# Patient Record
Sex: FEMALE | Race: WHITE | Employment: UNEMPLOYED | URBAN - METROPOLITAN AREA
[De-identification: names, ages, dates, MRNs, and addresses within clinical notes are randomized per-mention and may not be internally consistent; named-entity substitution may affect disease eponyms.]

---

## 2018-06-26 ENCOUNTER — HOSPITAL ENCOUNTER (OUTPATIENT)
Dept: WOUND CARE | Age: 48
Discharge: HOME | End: 2018-06-26
Payer: MEDICAID

## 2018-06-26 VITALS
HEIGHT: 65 IN | BODY MASS INDEX: 48.82 KG/M2 | RESPIRATION RATE: 20 BRPM | DIASTOLIC BLOOD PRESSURE: 68 MMHG | TEMPERATURE: 99 F | WEIGHT: 293 LBS | SYSTOLIC BLOOD PRESSURE: 133 MMHG | HEART RATE: 90 BPM

## 2018-06-26 DIAGNOSIS — E66.01 MORBID OBESITY DUE TO EXCESS CALORIES (HCC): Chronic | ICD-10-CM

## 2018-06-26 DIAGNOSIS — I83.012 VENOUS STASIS ULCER OF RIGHT CALF LIMITED TO BREAKDOWN OF SKIN WITH VARICOSE VEINS (HCC): Chronic | ICD-10-CM

## 2018-06-26 DIAGNOSIS — L97.211 VENOUS STASIS ULCER OF RIGHT CALF LIMITED TO BREAKDOWN OF SKIN WITH VARICOSE VEINS (HCC): Chronic | ICD-10-CM

## 2018-06-26 DIAGNOSIS — L97.212 VENOUS STASIS ULCER OF RIGHT CALF WITH FAT LAYER EXPOSED WITH VARICOSE VEINS (HCC): Chronic | ICD-10-CM

## 2018-06-26 DIAGNOSIS — I83.012 VENOUS STASIS ULCER OF RIGHT CALF WITH FAT LAYER EXPOSED WITH VARICOSE VEINS (HCC): Chronic | ICD-10-CM

## 2018-06-26 PROBLEM — L97.219 VENOUS STASIS ULCER OF RIGHT CALF (HCC): Chronic | Status: ACTIVE | Noted: 2018-06-26

## 2018-06-26 PROBLEM — I83.022 VENOUS STASIS ULCER OF LEFT CALF (HCC): Chronic | Status: ACTIVE | Noted: 2018-06-26

## 2018-06-26 PROBLEM — L97.229 VENOUS STASIS ULCER OF LEFT CALF (HCC): Chronic | Status: ACTIVE | Noted: 2018-06-26

## 2018-06-26 PROCEDURE — 87070 CULTURE OTHR SPECIMN AEROBIC: CPT

## 2018-06-26 PROCEDURE — 87186 SC STD MICRODIL/AGAR DIL: CPT

## 2018-06-26 PROCEDURE — 87205 SMEAR GRAM STAIN: CPT

## 2018-06-26 PROCEDURE — 99204 OFFICE O/P NEW MOD 45 MIN: CPT

## 2018-06-26 PROCEDURE — 87077 CULTURE AEROBIC IDENTIFY: CPT

## 2018-06-26 RX ORDER — ESCITALOPRAM OXALATE 20 MG/1
20 TABLET ORAL DAILY
COMMUNITY
Start: 2018-02-06

## 2018-06-26 RX ORDER — PREDNISONE 20 MG/1
20 TABLET ORAL DAILY
COMMUNITY
End: 2018-07-17

## 2018-06-26 RX ORDER — OXYBUTYNIN CHLORIDE 5 MG/1
5 TABLET ORAL 2 TIMES DAILY
Status: ON HOLD | COMMUNITY
End: 2019-12-07

## 2018-06-26 RX ORDER — TOPIRAMATE 25 MG/1
25 TABLET ORAL DAILY
Status: ON HOLD | COMMUNITY
End: 2018-09-18

## 2018-06-26 RX ORDER — ACETAMINOPHEN 500 MG
1000 TABLET ORAL 3 TIMES DAILY
COMMUNITY
Start: 2018-06-20

## 2018-06-26 RX ORDER — FUROSEMIDE 40 MG/1
TABLET ORAL
Status: ON HOLD | COMMUNITY
Start: 2018-06-04 | End: 2019-12-07

## 2018-06-26 RX ORDER — MELOXICAM 15 MG/1
15 TABLET ORAL DAILY
Status: ON HOLD | COMMUNITY
End: 2019-12-07

## 2018-06-26 RX ORDER — BUPROPION HYDROCHLORIDE 300 MG/1
300 TABLET ORAL EVERY MORNING
COMMUNITY
Start: 2018-05-02

## 2018-06-26 RX ORDER — TOPIRAMATE 25 MG/1
25 CAPSULE, COATED PELLETS ORAL DAILY
COMMUNITY
End: 2018-09-18

## 2018-06-26 RX ORDER — OMEPRAZOLE 20 MG/1
20 CAPSULE, DELAYED RELEASE ORAL DAILY PRN
Status: ON HOLD | COMMUNITY
End: 2019-12-07

## 2018-06-28 LAB
GRAM STAIN RESULT: ABNORMAL
ORGANISM: ABNORMAL
WOUND/ABSCESS: ABNORMAL
WOUND/ABSCESS: ABNORMAL

## 2018-07-03 ENCOUNTER — HOSPITAL ENCOUNTER (OUTPATIENT)
Dept: WOUND CARE | Age: 48
Discharge: HOME | End: 2018-07-03
Payer: COMMERCIAL

## 2018-07-03 VITALS
SYSTOLIC BLOOD PRESSURE: 135 MMHG | TEMPERATURE: 99.2 F | DIASTOLIC BLOOD PRESSURE: 67 MMHG | HEART RATE: 92 BPM | RESPIRATION RATE: 20 BRPM

## 2018-07-03 PROCEDURE — 99213 OFFICE O/P EST LOW 20 MIN: CPT

## 2018-07-03 ASSESSMENT — PAIN DESCRIPTION - LOCATION: LOCATION: LEG

## 2018-07-03 ASSESSMENT — PAIN SCALES - GENERAL: PAINLEVEL_OUTOF10: 8

## 2018-07-03 ASSESSMENT — PAIN DESCRIPTION - ORIENTATION: ORIENTATION: LEFT;POSTERIOR

## 2018-07-03 ASSESSMENT — PAIN DESCRIPTION - PAIN TYPE: TYPE: ACUTE PAIN

## 2018-07-03 ASSESSMENT — PAIN DESCRIPTION - DESCRIPTORS: DESCRIPTORS: DULL

## 2018-07-03 NOTE — PROGRESS NOTES
[facility]   Progress Note and Procedure Note      Clair Mujica  MEDICAL RECORD NUMBER:  [id number]  AGE: 56 y.o. GENDER: female  : 1970  EPISODE DATE:  7/3/2018    Subjective:     Chief Complaint   Patient presents with    Wound Check     BLE wound recheck         HISTORY of PRESENT ILLNESS HPI     Clair Mujica is a 56 y.o. female who presents today for wound/ulcer evaluation. History of Wound Context: Non-healing venous stasis ulcers bilateral LE. Patient had a rash form the keflex and stopped. Wound/Ulcer Pain Timing/Severity: constant  Quality of pain: sharp, burning  Severity:  5 / 10   Modifying Factors: Pain worsens with walking  Associated Signs/Symptoms: edema, erythema, drainage and pain    Ulcer Identification:  Ulcer Type: venous and lymphedema  Contributing Factors: edema, venous stasis, lymphedema and obesity    Wound: N/A        PAST MEDICAL HISTORY        Diagnosis Date    Morbid obesity ([address]     Overactive bladder     Psychiatric problem        PAST SURGICAL HISTORY    Past Surgical History:   Procedure Laterality Date    ABDOMEN SURGERY      gastric bypass surgery    BACK SURGERY      CHOLECYSTECTOMY         FAMILY HISTORY    History reviewed. No pertinent family history.     SOCIAL HISTORY    Social History   Substance Use Topics    Smoking status: Never Smoker    Smokeless tobacco: Never Used    Alcohol use No       ALLERGIES    Allergies   Allergen Reactions    Keflex [Cephalexin] Itching and Rash       MEDICATIONS    Current Outpatient Prescriptions on File Prior to Encounter   Medication Sig Dispense Refill    meloxicam (MOBIC) 15 MG tablet Take 15 mg by mouth daily      omeprazole (PRILOSEC) 20 MG delayed release capsule Take 20 mg by mouth daily      acetaminophen (TYLENOL) 500 MG tablet Take 1,000 mg by mouth      buPROPion (WELLBUTRIN XL) 300 MG extended release tablet Take 300 mg by mouth      escitalopram (LEXAPRO) 20 MG tablet Take 20 mg by mouth Wound Type Wound 6/26/2018  9:18 AM   Wound Venous 6/26/2018  9:18 AM   Wound Cleansed Rinsed/Irrigated with saline 6/26/2018  9:18 AM   Wound Length (cm) 6.2 cm 6/26/2018  9:18 AM   Wound Width (cm) 7.5 cm 6/26/2018  9:18 AM   Wound Depth (cm)  .1 6/26/2018  9:18 AM   Calculated Wound Size (cm^2) (l*w) 46.5 cm^2 6/26/2018  9:18 AM   Drainage Amount Moderate 6/26/2018  9:18 AM   Drainage Description Serous 6/26/2018  9:18 AM   Odor None 6/26/2018  9:18 AM   Margins Defined edges 6/26/2018  9:18 AM   Radha-wound Assessment Dry 6/26/2018  9:18 AM   Non-staged Wound Description Full thickness 6/26/2018  9:18 AM   [place]%Wound Bed 30 6/26/2018  9:18 AM   Op First Treatment Date 06/26/18 6/26/2018  9:18 AM   Number of days: 0         Plan:     Stop any abx  Increase diuretic per PCP      Treatment Note please see attached Discharge Instructions    Written patient dismissal instructions given to patient and signed by patient or POA. Discharge Instructions            Visit Discharge/Physician Orders:     Home Care: none     Wound Location: Right and Left b/l lower leg wounds     Dressing orders: 1. Cleanse wound(s) with normal saline. 2. Apply dry SILVERCEL OR CALCIUM ALGINATE WITH Ag or eqivalent to wound bed. 3. Cover with 4x4's, ABDs and wrap with gauze (crispin or kerlix)  4. Change  Daily     TODAY IN [facility]; apply silvercel (double up silvercel on right leg and ABDs) and use silvercel and dry Max dry to left leg     Compression:  Apply 10-20mmHg compression hose to b/l lower legs, may remove at bedtime, reapply first thing in the morning, avoid prolonged standing, elevate legs when sitting     Other Instructions: call  Primary doctor and ask about increasing lasix dose                                    Dressing supplies ordered today                                    Decrease salt intake in diet     Keep all dressings clean & dry.  Do not shower, take baths or get wound wet, unless otherwise instructed by your Wound Care doctor.     Follow up visit 2 Week      For Diabetic patients keep blood sugars below 150 for optimal wound healing.     If you experience any of the following, please call the Wound Care Service during business hours: [phone]                *Increase in pain         *Temperature over 101           *Increase in drainage from your wound or a foul odor  *Uncontrolled swelling            *Need for compression bandage changes due to slippage, breakthrough drainage                                                                                                                                                                                                                                                                                                                                                                                              If you need medical attention outside of business hours, please contact your Primary Care Doctor or go to the nearest emergency room. Keep next scheduled appointment. Please give 24 hour notice if unable to keep appointment. [phone]     PLEASE NOTE: IF YOU ARE UNABLE TO OBTAIN WOUND SUPPLIES, CONTINUE TO USE THE SUPPLIES YOU HAVE AVAILABLE UNTIL YOU ARE ABLE TO REACH US.  IT IS MOST IMPORTANT TO KEEP THE WOUND COVERED AT ALL TIMES  Electronically signed by Latia Starkey DPM on 7/3/2018 at 2:41 PM            Electronically signed by Latia Starkey DPM on 7/3/2018 at 2:45 PM

## 2018-07-17 ENCOUNTER — HOSPITAL ENCOUNTER (OUTPATIENT)
Dept: WOUND CARE | Age: 48
Discharge: HOME | End: 2018-07-17
Payer: COMMERCIAL

## 2018-07-17 VITALS
BODY MASS INDEX: 48.82 KG/M2 | HEART RATE: 88 BPM | RESPIRATION RATE: 20 BRPM | WEIGHT: 293 LBS | DIASTOLIC BLOOD PRESSURE: 74 MMHG | HEIGHT: 65 IN | TEMPERATURE: 98.6 F | SYSTOLIC BLOOD PRESSURE: 141 MMHG

## 2018-07-17 PROCEDURE — 99213 OFFICE O/P EST LOW 20 MIN: CPT

## 2018-07-17 ASSESSMENT — PAIN DESCRIPTION - ORIENTATION: ORIENTATION: RIGHT;LEFT

## 2018-07-17 ASSESSMENT — PAIN DESCRIPTION - FREQUENCY: FREQUENCY: INTERMITTENT

## 2018-07-17 ASSESSMENT — PAIN DESCRIPTION - LOCATION: LOCATION: LEG

## 2018-07-17 ASSESSMENT — PAIN DESCRIPTION - PAIN TYPE: TYPE: CHRONIC PAIN

## 2018-07-17 ASSESSMENT — PAIN DESCRIPTION - DESCRIPTORS: DESCRIPTORS: ACHING;BURNING

## 2018-07-17 ASSESSMENT — PAIN SCALES - GENERAL: PAINLEVEL_OUTOF10: 10

## 2018-07-24 ENCOUNTER — HOSPITAL ENCOUNTER (OUTPATIENT)
Dept: WOUND CARE | Age: 48
Discharge: HOME | End: 2018-07-24
Payer: COMMERCIAL

## 2018-07-24 VITALS
RESPIRATION RATE: 20 BRPM | DIASTOLIC BLOOD PRESSURE: 51 MMHG | TEMPERATURE: 98.8 F | HEART RATE: 82 BPM | SYSTOLIC BLOOD PRESSURE: 106 MMHG

## 2018-07-24 PROCEDURE — 11045 DBRDMT SUBQ TISS EACH ADDL: CPT

## 2018-07-24 PROCEDURE — 11042 DBRDMT SUBQ TIS 1ST 20SQCM/<: CPT

## 2018-07-24 PROCEDURE — 99213 OFFICE O/P EST LOW 20 MIN: CPT

## 2018-07-24 RX ORDER — DOXYCYCLINE HYCLATE 100 MG/1
100 CAPSULE ORAL 2 TIMES DAILY
COMMUNITY
End: 2018-08-28 | Stop reason: ALTCHOICE

## 2018-07-24 ASSESSMENT — PAIN DESCRIPTION - DESCRIPTORS: DESCRIPTORS: ACHING;BURNING

## 2018-07-24 ASSESSMENT — PAIN DESCRIPTION - ORIENTATION: ORIENTATION: RIGHT;LEFT

## 2018-07-24 ASSESSMENT — PAIN SCALES - GENERAL: PAINLEVEL_OUTOF10: 6

## 2018-07-24 ASSESSMENT — PAIN DESCRIPTION - FREQUENCY: FREQUENCY: CONTINUOUS

## 2018-07-24 ASSESSMENT — PAIN DESCRIPTION - LOCATION: LOCATION: LEG

## 2018-07-24 ASSESSMENT — PAIN DESCRIPTION - PAIN TYPE: TYPE: CHRONIC PAIN

## 2018-07-24 NOTE — CODE DOCUMENTATION
[address] Phys[facility] Billing Sheet. Alexis Watts  AGE: 53 y.o.    GENDER: female  : 1970  TODAY'S DATE:  2018    ICD-10 [address] [facility] [address] Problems    Diagnosis Date Noted    Morbid obesity due to excess calories ([facility] [E66.01] 2018    Venous stasis ulcer of left calf ([facility] [B30.732, L97.229] 2018    Venous stasis ulcer of right calf with fat layer exposed with varicose veins ([facility] [I83.012, L97.212] 2018       PHYSICIAN PROCEDURES    CPT CODE  09283 RT  74413 22.56      Electronically signed by Suzanne Ordaz DPM on 2018 at 10:16 AM

## 2018-07-24 NOTE — PROGRESS NOTES
[facility] [address]                                                   Progress Note and Procedure Note      Mauro Crigler  MEDICAL RECORD NUMBER:  [id number]  AGE: 53 y.o. GENDER: female  : 1970  EPISODE DATE:  2018    Subjective:     Chief Complaint   Patient presents with    Wound Check     b/l lower leg wounds         HISTORY of PRESENT ILLNESS HPI     Mauro Crigler is a 53 y.o. female who presents today for wound/ulcer evaluation. History of Wound Context: BLE venous ulcers. States she has been treating wound with silvercel, with compression. States the ACE wraps provide better compression of limbs and have seem to show the most improvement. Admits drainage in the RLE has improved the most, resulting in dressing changes once daily. LLE wounds still require 2-3 dressing changes per day. Overall states wounds have improved although still painful. Denies any nausea vomiting fever and chills  Wound/Ulcer Pain Timing/Severity: mild  Quality of pain: aching, burning  Severity:  5 / 10   Modifying Factors: Pain is relieved/improved with Compression and elevation  Associated Signs/Symptoms: edema, erythema, drainage and pain    Ulcer Identification:  Ulcer Type: venous  Contributing Factors: edema, venous stasis and lymphedema    Wound: N/A        PAST MEDICAL HISTORY        Diagnosis Date    Morbid obesity ([address]     Overactive bladder     Psychiatric problem        PAST SURGICAL HISTORY    Past Surgical History:   Procedure Laterality Date    ABDOMEN SURGERY      gastric bypass surgery    BACK SURGERY      CHOLECYSTECTOMY         FAMILY HISTORY    History reviewed. No pertinent family history.     SOCIAL HISTORY    Social History   Substance Use Topics    Smoking status: Never Smoker    Smokeless tobacco: Never Used    Alcohol use No       ALLERGIES    Allergies   Allergen Reactions    Keflex [Cephalexin] Itching and Rash       MEDICATIONS    Current Outpatient Prescriptions on File Prior to cooperative, and communicative. Appropriate interactions and affect. Assessment:      Patient Active Problem List   Diagnosis Code    Morbid obesity due to excess calories ([facility]) E66.01    Venous stasis ulcer of right calf with fat layer exposed with varicose veins ([facility]) I83.012, L97.212    Venous stasis ulcer of left calf ([facility]) I83.022, V93.059        Procedure Note  Indications:  Based on my examination of this patient's wound(s)/ulcer(s) today, debridement is required to promote healing and evaluate the wound base. Performed by: Serena Dillard DPM    Consent obtained:  Yes    Time out taken:  Yes    Pain Control: Anesthetic  Anesthetic: None       Debridement:Excisional Debridement    Using curette the wound(s)/ulcer(s) was/were sharply debrided down through and including the removal of subcutaneous tissue.         Devitalized Tissue Debrided:  fibrin    Pre Debridement Measurements:  Are located in the Wound/Ulcer Documentation Flow Sheet    Wound/Ulcer #: 1    Post Debridement Measurements:  Wound/Ulcer Descriptions are Pre Debridement except measurements:    Wound 06/26/18 Venous ulcer Pretibial Right #1 (Active)   Wound Image   7/24/2018  9:35 AM   Wound Type Wound 7/24/2018  9:35 AM   Wound Venous 7/24/2018  9:35 AM   Wound Cleansed Rinsed/Irrigated with saline 7/24/2018  9:35 AM   Wound Length (cm) 4.7 cm 7/24/2018  9:35 AM   Wound Width (cm) 4.8 cm 7/24/2018  9:35 AM   Wound Depth (cm)  0.1 7/24/2018  9:35 AM   Calculated Wound Size (cm^2) (l*w) 22.56 cm^2 7/24/2018  9:35 AM   Change in Wound Size % (l*w) -75.16 7/24/2018  9:35 AM   Drainage Amount Large 7/24/2018  9:35 AM   Drainage Description Serous 7/24/2018  9:35 AM   Odor None 7/24/2018  9:35 AM   Margins Defined edges 7/24/2018  9:35 AM   Radha-wound Assessment Fragile;Painful 7/24/2018  9:35 AM   Non-staged Wound Description Full thickness 7/24/2018  9:35 AM   [place]%Wound Bed 100 6/26/2018  9:18 AM   Red%Wound Bed 35 7/24/2018  9:35 AM   Yellow%Wound Bed 65 7/24/2018  9:35 AM   Op First Treatment Date 06/26/18 6/26/2018  9:18 AM   Number of days: 28       Wound 06/26/18 Venous ulcer Pretibial Left #2 (Active)   Wound Image   7/24/2018  9:35 AM   Wound Type Wound 7/24/2018  9:35 AM   Wound Venous 7/24/2018  9:35 AM   Wound Cleansed Rinsed/Irrigated with saline 7/24/2018  9:35 AM   Wound Length (cm) 5.3 cm 7/24/2018  9:35 AM   Wound Width (cm) 10 cm 7/24/2018  9:35 AM   Wound Depth (cm)  0.1 7/17/2018  9:56 AM   Calculated Wound Size (cm^2) (l*w) 53 cm^2 7/24/2018  9:35 AM   Change in Wound Size % (l*w) -606.67 7/24/2018  9:35 AM   Drainage Amount Large 7/24/2018  9:35 AM   Drainage Description Serous 7/24/2018  9:35 AM   Odor None 7/17/2018  9:56 AM   Margins Defined edges 7/24/2018  9:35 AM   Radha-wound Assessment Fragile;Painful 7/24/2018  9:35 AM   Non-staged Wound Description Full thickness 7/24/2018  9:35 AM   [place]%Wound Bed 40 6/26/2018  9:18 AM   Red%Wound Bed 70 7/24/2018  9:35 AM   Yellow%Wound Bed 30 7/24/2018  9:35 AM   Op First Treatment Date 06/26/18 6/26/2018  9:18 AM   Number of days: 28       Wound 06/26/18 Venous ulcer Leg Left;Posterior #3 (Active)   Wound Image   7/24/2018  9:35 AM   Wound Type Wound 7/24/2018  9:35 AM   Wound Venous 7/24/2018  9:35 AM   Wound Cleansed Rinsed/Irrigated with saline 7/24/2018  9:35 AM   Wound Length (cm) 6.6 cm 7/24/2018  9:35 AM   Wound Width (cm) 10 cm 7/24/2018  9:35 AM   Wound Depth (cm)  0.1 7/24/2018  9:35 AM   Calculated Wound Size (cm^2) (l*w) 66 cm^2 7/24/2018  9:35 AM   Change in Wound Size % (l*w) -41.94 7/24/2018  9:35 AM   Drainage Amount Copious 7/24/2018  9:35 AM   Drainage Description Serous 7/24/2018  9:35 AM   Odor None 7/24/2018  9:35 AM   Margins Defined edges 7/17/2018  9:56 AM   Radha-wound Assessment Fragile; Red 7/24/2018  9:35 AM   Non-staged Wound Description Full thickness 7/24/2018  9:35 AM   [place]%Wound Bed 30 6/26/2018  9:18 AM   Red%Wound Bed 85 7/24/2018  9:35 AM Yellow%Wound Bed 15 7/24/2018  9:35 AM   Op First Treatment Date 06/26/18 6/26/2018  9:18 AM   Number of days: 28       Percent of Wound/Ulcer Debrided: 100%    Total Surface Area Debrided:  22.56 sq cm     Diabetic/Pressure/Non Pressure Ulcers:  Ulcer: Non-Pressure ulcer, fat layer exposed      Bleeding:  Minimal    Hemostasis Achieved:  by pressure    Procedural Pain:  3  / 10     Post Procedural Pain:  3 / 10     Response to treatment:  Well tolerated by patient. Plan:     Continue current treatment. Treatment Note please see attached Discharge Instructions    Written patient dismissal instructions given to patient and signed by patient or POA. Discharge Instructions       Visit Discharge/Physician Orders:     Home Care: none     Wound Location: Right and Left b/l lower leg wounds     Dressing orders: 1. Cleanse wound(s) with normal saline. 2. Apply dry SILVERCEL OR CALCIUM ALGINATE WITH Ag or eqivalent  3. Apply OPTILOCK. 3. Cover with  ABDs and wrap with gauze (crispin or kerlix)  4. Change  Daily     TODAY IN [facility] use Drawtex in place of optilock     Compression:  Apply ACE WRAPS to b/l lower legs, may remove at bedtime, reapply first thing in the morning, avoid prolonged standing, elevate legs when sitting     Other Instructions: MAKE APPOINTMENT WITH INFECTIOUS DISEASE DOCTOR     Keep all dressings clean & dry.  Do not shower, take baths or get wound wet, unless otherwise instructed by your Wound Care doctor.     Follow up visit 2 Weeks  AUGUST 7, 2018 AT      For Diabetic patients keep blood sugars below 150 for optimal wound healing.     If you experience any of the following, please call the Wound Care Service during business hours: [phone]     *Increase in pain         *Temperature over 101           *Increase in drainage from your wound or a foul odor  *Uncontrolled swelling            *Need for compression bandage changes due to slippage, breakthrough drainage     If you need

## 2018-07-31 ENCOUNTER — TELEPHONE (OUTPATIENT)
Dept: ADMISSION | Age: 48
End: 2018-07-31

## 2018-07-31 ENCOUNTER — TELEPHONE (OUTPATIENT)
Dept: WOUND CARE | Age: 48
End: 2018-07-31

## 2018-07-31 NOTE — TELEPHONE ENCOUNTER
Patient has increased drainage, and does not feel well. Negative for fever. Advised patient to go to ER. Dr. Todd López RN notified.

## 2018-08-07 ENCOUNTER — HOSPITAL ENCOUNTER (OUTPATIENT)
Dept: WOUND CARE | Age: 48
Discharge: HOME | End: 2018-08-07
Payer: COMMERCIAL

## 2018-08-07 VITALS — BODY MASS INDEX: 85.03 KG/M2 | TEMPERATURE: 98.4 F | WEIGHT: 293 LBS

## 2018-08-07 DIAGNOSIS — I89.0 LYMPH EDEMA: Chronic | ICD-10-CM

## 2018-08-07 DIAGNOSIS — I87.2 VENOUS INSUFFICIENCY OF BOTH LOWER EXTREMITIES: ICD-10-CM

## 2018-08-07 PROCEDURE — 99213 OFFICE O/P EST LOW 20 MIN: CPT

## 2018-08-07 ASSESSMENT — PAIN DESCRIPTION - LOCATION: LOCATION: LEG

## 2018-08-07 ASSESSMENT — PAIN DESCRIPTION - DESCRIPTORS: DESCRIPTORS: BURNING

## 2018-08-07 ASSESSMENT — PAIN DESCRIPTION - ORIENTATION: ORIENTATION: LEFT

## 2018-08-07 ASSESSMENT — PAIN SCALES - GENERAL: PAINLEVEL_OUTOF10: 10

## 2018-08-07 NOTE — CODE DOCUMENTATION
[address] Phys[facility] Billing Sheet. Hanane Juarez  AGE: 53 y.o.    GENDER: female  : 1970  TODAY'S DATE:  2018    ICD-10 CODES  Active Hospital Problems    Diagnosis Date Noted    Lymph edema [I89.0] 2018    Venous insufficiency of both lower extremities [I87.2] 2018    Morbid obesity due to excess calories ([facility] [address] [E66.01] 2018    Venous stasis ulcer of left calf (HCC) [N81.837, L97.229] 2018    Venous stasis ulcer of right calf with fat layer exposed with varicose veins ([facility] [I83.012, L97.212] 2018       PHYSICIAN PROCEDURES    CPT CODE  88276      Electronically signed by Kd Sun DPM on 2018 at 5:29 PM

## 2018-08-07 NOTE — PROGRESS NOTES
odor  *Uncontrolled swelling            *Need for compression bandage changes due to slippage, breakthrough drainage     If you need medical attention outside of business hours, please contact your Primary Care Doctor or go to the nearest emergency room. Keep next scheduled appointment. Samanta Matias give 24 hour notice if unable to keep appointment. [phone]     PLEASE NOTE: IF YOU ARE UNABLE TO OBTAIN WOUND SUPPLIES, CONTINUE TO USE THE SUPPLIES YOU HAVE AVAILABLE UNTIL YOU ARE ABLE TO REACH US.  IT IS MOST IMPORTANT TO KEEP THE WOUND COVERED AT ALL TIMES  Electronically signed by Jeanna Sotomayor DPM on 8/7/2018 at 12:27 PM          Electronically signed by Jeanna Sotomayor DPM on 8/7/2018 at 5:28 PM

## 2018-08-28 ENCOUNTER — HOSPITAL ENCOUNTER (OUTPATIENT)
Dept: WOUND CARE | Age: 48
Discharge: HOME | End: 2018-08-28
Payer: COMMERCIAL

## 2018-08-28 VITALS
DIASTOLIC BLOOD PRESSURE: 65 MMHG | TEMPERATURE: 99 F | SYSTOLIC BLOOD PRESSURE: 147 MMHG | RESPIRATION RATE: 20 BRPM | HEART RATE: 103 BPM

## 2018-08-28 PROCEDURE — 99213 OFFICE O/P EST LOW 20 MIN: CPT

## 2018-08-28 ASSESSMENT — PAIN DESCRIPTION - LOCATION: LOCATION: LEG

## 2018-08-28 ASSESSMENT — PAIN SCALES - GENERAL: PAINLEVEL_OUTOF10: 7

## 2018-08-28 ASSESSMENT — PAIN DESCRIPTION - FREQUENCY: FREQUENCY: CONTINUOUS

## 2018-08-28 ASSESSMENT — PAIN DESCRIPTION - DESCRIPTORS: DESCRIPTORS: BURNING

## 2018-08-28 NOTE — PROGRESS NOTES
of right calf with fat layer exposed with varicose veins (HCC) I83.012, L97.212    Venous stasis ulcer of left calf ([facility]CC) I83.022, L97.229    Venous insufficiency of both lower extremities I87.2    Lymph edema I89.0        Procedure Note  Indications:  Based on my examination of this patient's wound(s)/ulcer(s) today, debridement is not required to promote healing and evaluate the wound base. Electronically signed by Patricio Mancia DPM on 8/28/2018 at 4:27 PM      Plan:     Patient was Dispensed Biotab lymphedema pumps today in clinic by rep and given use instruction. Report to ER if condition worsens. Bariatric consult pending end of the month current weight today 511 lbs. Treatment Note please see attached Discharge Instructions    Written patient dismissal instructions given to patient and signed by patient or POA. Discharge Instructions       Visit Discharge/Physician Orders:     Home Care: none     Wound Location: Right and Left lower leg wounds     Dressing orders:  1. Cleanse wounds with normal saline or a 50/50 solution of saline/white vinegar. 2. Apply Bacitracin or Silvadene to wounds  3. Apply Flivasorb or Optilock to wounds. 4. Change dressing every day.     Compression:  Apply ACE WRAPS to b/l lower legs, may remove at bedtime, reapply first thing in the morning, avoid prolonged standing, elevate legs when sitting     Other Instructions: Use lymphedema pumps for one hour every day. Cover wounds with chux when using pumps.     Keep all dressings clean & dry.  Do not shower, take baths or get wound wet, unless otherwise instructed by your Wound Care doctor.     Follow up visit 2 Weeks       For Diabetic patients keep blood sugars below 150 for optimal wound healing.     If you experience any of the following, please call the Wound Care Service during business hours: [phone]     *Increase in pain         *Temperature over 101           *Increase in drainage from your wound or a foul odor  *Uncontrolled swelling            *Need for compression bandage changes due to slippage, breakthrough drainage     If you need medical attention outside of business hours, please contact your Primary Care Doctor or go to the nearest emergency room. Keep next scheduled appointment. Jake Leiva give 24 hour notice if unable to keep appointment. [phone]     PLEASE NOTE: IF YOU ARE UNABLE TO OBTAIN WOUND SUPPLIES, CONTINUE TO USE THE SUPPLIES YOU HAVE AVAILABLE UNTIL YOU ARE ABLE TO REACH US.  IT IS MOST IMPORTANT TO KEEP THE WOUND COVERED AT ALL TIMES  Electronically signed by Suzanne Ordaz DPM on 8/28/2018 at 3:49 PM          Electronically signed by Suzanne Ordaz DPM on 8/28/2018 at 4:27 PM

## 2018-08-28 NOTE — CODE DOCUMENTATION
[address] [facility] [address] Physician Billing Sheet. Jarrod Blank  AGE: 53 y.o.    GENDER: female  : 1970  TODAY'S DATE:  2018    ICD-10  [facility] Street Problems    Diagnosis Date Noted    Lymph edema [I89.0] 2018    Venous insufficiency of both lower extremities [I87.2] 2018    Morbid obesity due to excess calories ([facility] [address] [E66.01] 2018    Venous stasis ulcer of left calf (HCC) [E45.039, L97.229] 2018    Venous stasis ulcer of right calf with fat layer exposed with varicose veins ([address] [I83.012, L97.212] 2018       PHYSICIAN PROCEDURES    CPT CODE  05549      Electronically signed by Linnea Bryson DPM on 2018 at 3:51 PM

## 2018-08-28 NOTE — PLAN OF CARE
Problem: Pain:  Goal: Control of chronic pain  Control of chronic pain   Outcome: Ongoing  See flowsheet

## 2018-08-28 NOTE — PLAN OF CARE
Problem: Pain:  Goal: Pain level will decrease  Pain level will decrease   Outcome: Ongoing  See flowsheet

## 2018-09-18 ENCOUNTER — HOSPITAL ENCOUNTER (INPATIENT)
Age: 48
LOS: 3 days | Discharge: NURSING, REHAB OR LONG-TERM CARE | DRG: 380 | End: 2018-09-21
Attending: STUDENT IN AN ORGANIZED HEALTH CARE EDUCATION/TRAINING PROGRAM | Admitting: INTERNAL MEDICINE
Payer: COMMERCIAL

## 2018-09-18 ENCOUNTER — APPOINTMENT (OUTPATIENT)
Dept: GENERAL RADIOLOGY | Age: 48
DRG: 380 | End: 2018-09-18
Payer: COMMERCIAL

## 2018-09-18 DIAGNOSIS — S81.809A MULTIPLE OPEN WOUNDS OF LOWER LEG, UNSPECIFIED LATERALITY, INITIAL ENCOUNTER: Primary | ICD-10-CM

## 2018-09-18 DIAGNOSIS — R52 INTRACTABLE PAIN: ICD-10-CM

## 2018-09-18 DIAGNOSIS — R79.82 ELEVATED C-REACTIVE PROTEIN (CRP): ICD-10-CM

## 2018-09-18 DIAGNOSIS — L03.116 BILATERAL CELLULITIS OF LOWER LEG: ICD-10-CM

## 2018-09-18 DIAGNOSIS — Z78.9 FAILURE OF OUTPATIENT TREATMENT: ICD-10-CM

## 2018-09-18 DIAGNOSIS — L03.115 BILATERAL CELLULITIS OF LOWER LEG: ICD-10-CM

## 2018-09-18 DIAGNOSIS — E66.01 MORBID OBESITY DUE TO EXCESS CALORIES (HCC): ICD-10-CM

## 2018-09-18 PROBLEM — L03.90 CELLULITIS: Status: ACTIVE | Noted: 2018-09-18

## 2018-09-18 LAB
ALBUMIN SERPL-MCNC: 3.6 G/DL (ref 3.9–4.9)
ALBUMIN SERPL-MCNC: 4 G/DL (ref 3.9–4.9)
ALP BLD-CCNC: 80 U/L (ref 40–130)
ALP BLD-CCNC: 82 U/L (ref 40–130)
ALT SERPL-CCNC: 10 U/L (ref 0–33)
ALT SERPL-CCNC: 12 U/L (ref 0–33)
ANION GAP SERPL CALCULATED.3IONS-SCNC: 12 MEQ/L (ref 7–13)
ANION GAP SERPL CALCULATED.3IONS-SCNC: 15 MEQ/L (ref 7–13)
APTT: 27.5 SEC (ref 21.6–35.4)
AST SERPL-CCNC: 12 U/L (ref 0–35)
AST SERPL-CCNC: 16 U/L (ref 0–35)
BASOPHILS ABSOLUTE: 0.1 K/UL (ref 0–0.2)
BASOPHILS RELATIVE PERCENT: 0.7 %
BILIRUB SERPL-MCNC: <0.2 MG/DL (ref 0–1.2)
BILIRUB SERPL-MCNC: <0.2 MG/DL (ref 0–1.2)
BUN BLDV-MCNC: 20 MG/DL (ref 6–20)
BUN BLDV-MCNC: 21 MG/DL (ref 6–20)
C-REACTIVE PROTEIN: 24.1 MG/L (ref 0–5)
CALCIUM SERPL-MCNC: 9 MG/DL (ref 8.6–10.2)
CALCIUM SERPL-MCNC: 9.4 MG/DL (ref 8.6–10.2)
CHLORIDE BLD-SCNC: 97 MEQ/L (ref 98–107)
CHLORIDE BLD-SCNC: 98 MEQ/L (ref 98–107)
CHP ED QC CHECK: NORMAL
CO2: 26 MEQ/L (ref 22–29)
CO2: 30 MEQ/L (ref 22–29)
CREAT SERPL-MCNC: 0.69 MG/DL (ref 0.5–0.9)
CREAT SERPL-MCNC: 0.72 MG/DL (ref 0.5–0.9)
EOSINOPHILS ABSOLUTE: 0.1 K/UL (ref 0–0.7)
EOSINOPHILS RELATIVE PERCENT: 1.1 %
GFR AFRICAN AMERICAN: >60
GFR AFRICAN AMERICAN: >60
GFR NON-AFRICAN AMERICAN: >60
GFR NON-AFRICAN AMERICAN: >60
GLOBULIN: 3.3 G/DL (ref 2.3–3.5)
GLOBULIN: 3.5 G/DL (ref 2.3–3.5)
GLUCOSE BLD-MCNC: 105 MG/DL (ref 74–109)
GLUCOSE BLD-MCNC: 110 MG/DL (ref 74–109)
HCT VFR BLD CALC: 43.4 % (ref 37–47)
HEMOGLOBIN: 14.3 G/DL (ref 12–16)
INR BLD: 1
LACTIC ACID: 2.2 MMOL/L (ref 0.5–2.2)
LYMPHOCYTES ABSOLUTE: 1.6 K/UL (ref 1–4.8)
LYMPHOCYTES RELATIVE PERCENT: 19.4 %
MCH RBC QN AUTO: 26 PG (ref 27–31.3)
MCHC RBC AUTO-ENTMCNC: 33 % (ref 33–37)
MCV RBC AUTO: 78.7 FL (ref 82–100)
MONOCYTES ABSOLUTE: 0.7 K/UL (ref 0.2–0.8)
MONOCYTES RELATIVE PERCENT: 8.1 %
NEUTROPHILS ABSOLUTE: 5.8 K/UL (ref 1.4–6.5)
NEUTROPHILS RELATIVE PERCENT: 70.7 %
PDW BLD-RTO: 15.7 % (ref 11.5–14.5)
PLATELET # BLD: 205 K/UL (ref 130–400)
POTASSIUM SERPL-SCNC: 4 MEQ/L (ref 3.5–5.1)
POTASSIUM SERPL-SCNC: 4.3 MEQ/L (ref 3.5–5.1)
PREGNANCY TEST URINE, POC: NEGATIVE
PROTHROMBIN TIME: 10 SEC (ref 9.6–12.3)
RBC # BLD: 5.51 M/UL (ref 4.2–5.4)
SEDIMENTATION RATE, ERYTHROCYTE: 35 MM (ref 0–20)
SODIUM BLD-SCNC: 138 MEQ/L (ref 132–144)
SODIUM BLD-SCNC: 140 MEQ/L (ref 132–144)
TOTAL CK: 41 U/L (ref 0–170)
TOTAL PROTEIN: 6.9 G/DL (ref 6.4–8.1)
TOTAL PROTEIN: 7.5 G/DL (ref 6.4–8.1)
WBC # BLD: 8.2 K/UL (ref 4.8–10.8)

## 2018-09-18 PROCEDURE — 87186 SC STD MICRODIL/AGAR DIL: CPT

## 2018-09-18 PROCEDURE — 2500000003 HC RX 250 WO HCPCS: Performed by: STUDENT IN AN ORGANIZED HEALTH CARE EDUCATION/TRAINING PROGRAM

## 2018-09-18 PROCEDURE — 6360000002 HC RX W HCPCS: Performed by: STUDENT IN AN ORGANIZED HEALTH CARE EDUCATION/TRAINING PROGRAM

## 2018-09-18 PROCEDURE — 83605 ASSAY OF LACTIC ACID: CPT

## 2018-09-18 PROCEDURE — 73590 X-RAY EXAM OF LOWER LEG: CPT

## 2018-09-18 PROCEDURE — 87070 CULTURE OTHR SPECIMN AEROBIC: CPT

## 2018-09-18 PROCEDURE — 36415 COLL VENOUS BLD VENIPUNCTURE: CPT

## 2018-09-18 PROCEDURE — 85610 PROTHROMBIN TIME: CPT

## 2018-09-18 PROCEDURE — 1210000000 HC MED SURG R&B

## 2018-09-18 PROCEDURE — 85730 THROMBOPLASTIN TIME PARTIAL: CPT

## 2018-09-18 PROCEDURE — 6360000002 HC RX W HCPCS: Performed by: PHYSICIAN ASSISTANT

## 2018-09-18 PROCEDURE — 96376 TX/PRO/DX INJ SAME DRUG ADON: CPT

## 2018-09-18 PROCEDURE — 96365 THER/PROPH/DIAG IV INF INIT: CPT

## 2018-09-18 PROCEDURE — 87205 SMEAR GRAM STAIN: CPT

## 2018-09-18 PROCEDURE — 96367 TX/PROPH/DG ADDL SEQ IV INF: CPT

## 2018-09-18 PROCEDURE — 99285 EMERGENCY DEPT VISIT HI MDM: CPT

## 2018-09-18 PROCEDURE — 82550 ASSAY OF CK (CPK): CPT

## 2018-09-18 PROCEDURE — 2580000003 HC RX 258: Performed by: STUDENT IN AN ORGANIZED HEALTH CARE EDUCATION/TRAINING PROGRAM

## 2018-09-18 PROCEDURE — 85025 COMPLETE CBC W/AUTO DIFF WBC: CPT

## 2018-09-18 PROCEDURE — 96375 TX/PRO/DX INJ NEW DRUG ADDON: CPT

## 2018-09-18 PROCEDURE — 96366 THER/PROPH/DIAG IV INF ADDON: CPT

## 2018-09-18 PROCEDURE — 85652 RBC SED RATE AUTOMATED: CPT

## 2018-09-18 PROCEDURE — 80053 COMPREHEN METABOLIC PANEL: CPT

## 2018-09-18 PROCEDURE — 87040 BLOOD CULTURE FOR BACTERIA: CPT

## 2018-09-18 PROCEDURE — 86140 C-REACTIVE PROTEIN: CPT

## 2018-09-18 PROCEDURE — 2580000003 HC RX 258: Performed by: PHYSICIAN ASSISTANT

## 2018-09-18 PROCEDURE — 87077 CULTURE AEROBIC IDENTIFY: CPT

## 2018-09-18 RX ORDER — OXYCODONE HYDROCHLORIDE AND ACETAMINOPHEN 5; 325 MG/1; MG/1
1 TABLET ORAL EVERY 4 HOURS PRN
Status: DISCONTINUED | OUTPATIENT
Start: 2018-09-18 | End: 2018-09-21 | Stop reason: HOSPADM

## 2018-09-18 RX ORDER — OXYCODONE HYDROCHLORIDE AND ACETAMINOPHEN 5; 325 MG/1; MG/1
2 TABLET ORAL EVERY 4 HOURS PRN
Status: DISCONTINUED | OUTPATIENT
Start: 2018-09-18 | End: 2018-09-21 | Stop reason: HOSPADM

## 2018-09-18 RX ORDER — KETAMINE HYDROCHLORIDE 50 MG/ML
15 INJECTION, SOLUTION, CONCENTRATE INTRAMUSCULAR; INTRAVENOUS ONCE
Status: COMPLETED | OUTPATIENT
Start: 2018-09-18 | End: 2018-09-18

## 2018-09-18 RX ORDER — SODIUM CHLORIDE 0.9 % (FLUSH) 0.9 %
10 SYRINGE (ML) INJECTION EVERY 12 HOURS SCHEDULED
Status: DISCONTINUED | OUTPATIENT
Start: 2018-09-18 | End: 2018-09-21 | Stop reason: HOSPADM

## 2018-09-18 RX ORDER — FENTANYL CITRATE 50 UG/ML
50 INJECTION, SOLUTION INTRAMUSCULAR; INTRAVENOUS ONCE
Status: COMPLETED | OUTPATIENT
Start: 2018-09-18 | End: 2018-09-18

## 2018-09-18 RX ORDER — DIPHENHYDRAMINE HCL 25 MG
25 CAPSULE ORAL EVERY 6 HOURS PRN
COMMUNITY
Start: 2018-06-08

## 2018-09-18 RX ORDER — SODIUM CHLORIDE 9 MG/ML
INJECTION, SOLUTION INTRAVENOUS CONTINUOUS
Status: DISCONTINUED | OUTPATIENT
Start: 2018-09-18 | End: 2018-09-18

## 2018-09-18 RX ORDER — SODIUM CHLORIDE 0.9 % (FLUSH) 0.9 %
10 SYRINGE (ML) INJECTION PRN
Status: DISCONTINUED | OUTPATIENT
Start: 2018-09-18 | End: 2018-09-21 | Stop reason: HOSPADM

## 2018-09-18 RX ORDER — TOPIRAMATE 25 MG/1
50 TABLET ORAL 2 TIMES DAILY
COMMUNITY
Start: 2017-10-30

## 2018-09-18 RX ORDER — ONDANSETRON 2 MG/ML
4 INJECTION INTRAMUSCULAR; INTRAVENOUS ONCE
Status: COMPLETED | OUTPATIENT
Start: 2018-09-18 | End: 2018-09-18

## 2018-09-18 RX ORDER — FUROSEMIDE 10 MG/ML
20 INJECTION INTRAMUSCULAR; INTRAVENOUS 2 TIMES DAILY
Status: DISCONTINUED | OUTPATIENT
Start: 2018-09-19 | End: 2018-09-21 | Stop reason: HOSPADM

## 2018-09-18 RX ORDER — 0.9 % SODIUM CHLORIDE 0.9 %
1000 INTRAVENOUS SOLUTION INTRAVENOUS ONCE
Status: COMPLETED | OUTPATIENT
Start: 2018-09-18 | End: 2018-09-18

## 2018-09-18 RX ORDER — KETAMINE HYDROCHLORIDE 50 MG/ML
25 INJECTION, SOLUTION, CONCENTRATE INTRAMUSCULAR; INTRAVENOUS ONCE
Status: COMPLETED | OUTPATIENT
Start: 2018-09-18 | End: 2018-09-18

## 2018-09-18 RX ORDER — ONDANSETRON 2 MG/ML
4 INJECTION INTRAMUSCULAR; INTRAVENOUS EVERY 6 HOURS PRN
Status: DISCONTINUED | OUTPATIENT
Start: 2018-09-18 | End: 2018-09-21 | Stop reason: HOSPADM

## 2018-09-18 RX ADMIN — HYDROMORPHONE HYDROCHLORIDE 0.5 MG: 1 INJECTION, SOLUTION INTRAMUSCULAR; INTRAVENOUS; SUBCUTANEOUS at 23:01

## 2018-09-18 RX ADMIN — KETAMINE HYDROCHLORIDE 25 MG: 50 INJECTION, SOLUTION INTRAMUSCULAR; INTRAVENOUS at 19:00

## 2018-09-18 RX ADMIN — Medication 10 ML: at 22:18

## 2018-09-18 RX ADMIN — PIPERACILLIN SODIUM,TAZOBACTAM SODIUM 3.38 G: 3; .375 INJECTION, POWDER, FOR SOLUTION INTRAVENOUS at 17:14

## 2018-09-18 RX ADMIN — VANCOMYCIN HYDROCHLORIDE 2000 MG: 1 INJECTION, POWDER, LYOPHILIZED, FOR SOLUTION INTRAVENOUS at 18:13

## 2018-09-18 RX ADMIN — ONDANSETRON HYDROCHLORIDE 4 MG: 2 INJECTION, SOLUTION INTRAMUSCULAR; INTRAVENOUS at 19:16

## 2018-09-18 RX ADMIN — FENTANYL CITRATE 50 MCG: 50 INJECTION INTRAMUSCULAR; INTRAVENOUS at 20:11

## 2018-09-18 RX ADMIN — SODIUM CHLORIDE 1000 ML: 9 INJECTION, SOLUTION INTRAVENOUS at 17:14

## 2018-09-18 RX ADMIN — KETAMINE HYDROCHLORIDE 15 MG: 50 INJECTION INTRAMUSCULAR; INTRAVENOUS at 17:05

## 2018-09-18 ASSESSMENT — PAIN SCALES - GENERAL
PAINLEVEL_OUTOF10: 10
PAINLEVEL_OUTOF10: 4
PAINLEVEL_OUTOF10: 9
PAINLEVEL_OUTOF10: 9
PAINLEVEL_OUTOF10: 10
PAINLEVEL_OUTOF10: 9
PAINLEVEL_OUTOF10: 10

## 2018-09-18 ASSESSMENT — PAIN DESCRIPTION - ONSET: ONSET: ON-GOING

## 2018-09-18 ASSESSMENT — PAIN DESCRIPTION - ORIENTATION
ORIENTATION: RIGHT;LEFT
ORIENTATION: LEFT;LOWER
ORIENTATION: RIGHT;LEFT

## 2018-09-18 ASSESSMENT — ENCOUNTER SYMPTOMS
COUGH: 0
VOMITING: 0
ABDOMINAL PAIN: 0
DIARRHEA: 0
BACK PAIN: 0
SHORTNESS OF BREATH: 0
TROUBLE SWALLOWING: 0
SINUS PRESSURE: 0
CHEST TIGHTNESS: 0

## 2018-09-18 ASSESSMENT — PAIN DESCRIPTION - PAIN TYPE
TYPE: ACUTE PAIN

## 2018-09-18 ASSESSMENT — PAIN DESCRIPTION - LOCATION
LOCATION: LEG

## 2018-09-18 ASSESSMENT — PAIN DESCRIPTION - FREQUENCY
FREQUENCY: CONTINUOUS
FREQUENCY: CONTINUOUS

## 2018-09-18 ASSESSMENT — PAIN DESCRIPTION - DESCRIPTORS
DESCRIPTORS: BURNING

## 2018-09-18 ASSESSMENT — PAIN DESCRIPTION - PROGRESSION
CLINICAL_PROGRESSION: GRADUALLY WORSENING
CLINICAL_PROGRESSION: RAPIDLY WORSENING

## 2018-09-18 NOTE — ED NOTES
Patient sitting up in wheelchair with family at bedside, no distress noted at this time.  Patient continues to have serous drainage from wounds to bilateral legs worse to left leg than right      Talon Lowe RN  09/18/18 [id number]

## 2018-09-18 NOTE — ED NOTES
Patient states she removed the blood pressure cuff because with it going off and the pain in her extremities it was 'too much to tolerate'.       Reece Mercado RN  09/18/18 [id number]

## 2018-09-18 NOTE — ED NOTES
Spoke with lab about adding on a CMP to previously drawn labs. Confirmed they would be able to add on via MRN.       Kezia Fernandes RN  09/18/18 0238

## 2018-09-18 NOTE — ED NOTES
Patient sitting up in wheelchair at this time. Spoke with pharmacy about ketamine injection that will be sent secure code.       Timothy Berumen RN  09/18/18 [id number]

## 2018-09-18 NOTE — ED NOTES
Patient wheeled herself to restroom at this time to obtain urine specimen.       Janay Bryan RN  09/18/18 [id number]

## 2018-09-18 NOTE — ED PROVIDER NOTES
Cardiovascular: Negative for chest pain and leg swelling. Gastrointestinal: Negative for abdominal pain, diarrhea and vomiting. Endocrine: Negative for polydipsia and polyphagia. Genitourinary: Negative for dysuria, flank pain and frequency. Musculoskeletal: Negative for back pain and myalgias. Skin: Positive for wound ( Bilateral legs). Negative for pallor and rash. Neurological: Negative for syncope, weakness and headaches. Hematological: Does not bruise/bleed easily. All other systems reviewed and are negative. Except as noted above the remainder of the review of systems was reviewed and negative. PAST MEDICAL HISTORY     Past Medical History:   Diagnosis Date    Morbid obesity ([facility]     Overactive bladder     Psychiatric problem          SURGICAL HISTORY       Past Surgical History:   Procedure Laterality Date    ABDOMEN SURGERY      gastric bypass surgery    BACK SURGERY      CHOLECYSTECTOMY           CURRENT MEDICATIONS       Previous Medications    ACETAMINOPHEN (TYLENOL) 500 MG TABLET    Take 1,000 mg by mouth    BUPROPION (WELLBUTRIN XL) 300 MG EXTENDED RELEASE TABLET    Take 300 mg by mouth    ESCITALOPRAM (LEXAPRO) 20 MG TABLET    Take 20 mg by mouth    FUROSEMIDE (LASIX) 40 MG TABLET    TAKE ONE TABLET BY MOUTH EVERY DAY AS NEEDED for severe leg swelling    MELOXICAM (MOBIC) 15 MG TABLET    Take 15 mg by mouth daily    OMEPRAZOLE (PRILOSEC) 20 MG DELAYED RELEASE CAPSULE    Take 20 mg by mouth daily    OXYBUTYNIN (DITROPAN) 5 MG TABLET    Take 5 mg by mouth 2 times daily    SILVER SULFADIAZINE (SILVADENE) 1 % CREAM    Apply topically daily. SODIUM CHLORIDE, EXTERNAL, 0.9 % SOLN    Apply 1 Applicatorful topically daily    TOPIRAMATE (TOPAMAX) 25 MG TABLET    Take 25 mg by mouth daily       ALLERGIES     Keflex [cephalexin]    FAMILY HISTORY     History reviewed. No pertinent family history.        SOCIAL HISTORY       Social History     Social History    Marital

## 2018-09-18 NOTE — CONSULTS
Vascular:  Faintly Palpable Dorsalis Pedis and Faintly Palpable Posterior Tibial Pulses B/L   Capillary Fill time < 3 seconds to B/L digits  Skin temperature warm to warm tibial tuberosity to the digits B/L  Hair growth present to digits  Severe edema, No varicosities     Neurological:   Epicritic sensation intact B/L  Protective sensation via monofilament testing intact B/L    Musculoskeletal/Orthopaedic:   Deferred    Dermatological:   Skin appears well hydrated and supple with good temperature, texture, turgor. No hyperkeratosis  noted. Interspaces 1-4 are clear and without debris B/L. Nails 1-5 appear normotrophic  Open lesions present to b/l lower extremity as described below. Ulceration #1:   Location: Left leg  Measurements: ~20 cm x 10 cm x 0.2 cm  Base: Mixed Granular/Fibrotic  Borders: viable without evidence of maceration or hyperkeratosis   Exudate: HEAVY serous exudate  Comments: + erythema extending ~5cm beyond borders with no evidence of ascending lymphangitis. Wound does not undermine, does not probe to bone. Ulceration #2:   Location: Right anterior leg, two ulcerations adjacent to one another  Measurements: ~3.0 cm x 1.0 cm x 0.3 cm; 2.0cm x 2.0cm x 0.2cm  Base: Mixed Granular/Fibrotic  Borders: Viable, without evidence of hyperkeratosis or maceration   Exudate: HEAVY serous exudate  Comments: Mild erythema extending <2cm beyond borders with no evidence of ascending lymphangitis. Wound does not undermine, does not probe to bone.          LABS:   Lab Results   Component Value Date    WBC 8.2 09/18/2018    HGB 14.3 09/18/2018    HCT 43.4 09/18/2018    MCV 78.7 (L) 09/18/2018     09/18/2018     No results found for: NA, K, CL, CO2, BUN, CREATININE, GLUCOSE, CALCIUM   No results found for: LABPROT, LABALBU  Lab Results   Component Value Date    SEDRATE 35 (H) 09/18/2018     Lab Results   Component Value Date    CRP 24.1 (H) 09/18/2018     No results found for: LABA1C  No results

## 2018-09-18 NOTE — ED NOTES
Patient noted to have wounds on lower legs at this time. Patient states she has been admitted to the hospital 3 times for cellulitis. Patient dressings saturated in serous drainage with foul smell.       Chiqui Cisse RN  09/18/18 [facility], RN  09/18/18 0155

## 2018-09-19 LAB
ALBUMIN SERPL-MCNC: 3.4 G/DL (ref 3.9–4.9)
ALP BLD-CCNC: 65 U/L (ref 40–130)
ALT SERPL-CCNC: 9 U/L (ref 0–33)
ANION GAP SERPL CALCULATED.3IONS-SCNC: 11 MEQ/L (ref 7–13)
AST SERPL-CCNC: 13 U/L (ref 0–35)
BASOPHILS ABSOLUTE: 0 K/UL (ref 0–0.2)
BASOPHILS RELATIVE PERCENT: 0.4 %
BILIRUB SERPL-MCNC: 0.5 MG/DL (ref 0–1.2)
BUN BLDV-MCNC: 21 MG/DL (ref 6–20)
CALCIUM SERPL-MCNC: 8.8 MG/DL (ref 8.6–10.2)
CHLORIDE BLD-SCNC: 96 MEQ/L (ref 98–107)
CO2: 28 MEQ/L (ref 22–29)
CREAT SERPL-MCNC: 0.69 MG/DL (ref 0.5–0.9)
EOSINOPHILS ABSOLUTE: 0.1 K/UL (ref 0–0.7)
EOSINOPHILS RELATIVE PERCENT: 1.2 %
GFR AFRICAN AMERICAN: >60
GFR NON-AFRICAN AMERICAN: >60
GLOBULIN: 3.4 G/DL (ref 2.3–3.5)
GLUCOSE BLD-MCNC: 117 MG/DL (ref 74–109)
HCT VFR BLD CALC: 41.8 % (ref 37–47)
HEMOGLOBIN: 13.8 G/DL (ref 12–16)
LACTIC ACID: 1.3 MMOL/L (ref 0.5–2.2)
LYMPHOCYTES ABSOLUTE: 1.2 K/UL (ref 1–4.8)
LYMPHOCYTES RELATIVE PERCENT: 13.6 %
MCH RBC QN AUTO: 26.2 PG (ref 27–31.3)
MCHC RBC AUTO-ENTMCNC: 33 % (ref 33–37)
MCV RBC AUTO: 79.3 FL (ref 82–100)
MONOCYTES ABSOLUTE: 0.6 K/UL (ref 0.2–0.8)
MONOCYTES RELATIVE PERCENT: 6.5 %
NEUTROPHILS ABSOLUTE: 6.8 K/UL (ref 1.4–6.5)
NEUTROPHILS RELATIVE PERCENT: 78.3 %
PDW BLD-RTO: 15.9 % (ref 11.5–14.5)
PLATELET # BLD: 198 K/UL (ref 130–400)
POTASSIUM REFLEX MAGNESIUM: 4.1 MEQ/L (ref 3.5–5.1)
RBC # BLD: 5.27 M/UL (ref 4.2–5.4)
SODIUM BLD-SCNC: 135 MEQ/L (ref 132–144)
TOTAL PROTEIN: 6.8 G/DL (ref 6.4–8.1)
VANCOMYCIN TROUGH: 17.3 UG/ML (ref 10–20)
WBC # BLD: 8.7 K/UL (ref 4.8–10.8)

## 2018-09-19 PROCEDURE — 6360000002 HC RX W HCPCS: Performed by: PHYSICIAN ASSISTANT

## 2018-09-19 PROCEDURE — 1210000000 HC MED SURG R&B

## 2018-09-19 PROCEDURE — 83605 ASSAY OF LACTIC ACID: CPT

## 2018-09-19 PROCEDURE — 36415 COLL VENOUS BLD VENIPUNCTURE: CPT

## 2018-09-19 PROCEDURE — 6370000000 HC RX 637 (ALT 250 FOR IP): Performed by: INTERNAL MEDICINE

## 2018-09-19 PROCEDURE — 6360000002 HC RX W HCPCS: Performed by: INTERNAL MEDICINE

## 2018-09-19 PROCEDURE — 85025 COMPLETE CBC W/AUTO DIFF WBC: CPT

## 2018-09-19 PROCEDURE — 99254 IP/OBS CNSLTJ NEW/EST MOD 60: CPT | Performed by: INTERNAL MEDICINE

## 2018-09-19 PROCEDURE — 80053 COMPREHEN METABOLIC PANEL: CPT

## 2018-09-19 PROCEDURE — 80202 ASSAY OF VANCOMYCIN: CPT

## 2018-09-19 PROCEDURE — 2580000003 HC RX 258: Performed by: PHYSICIAN ASSISTANT

## 2018-09-19 RX ORDER — MAGNESIUM HYDROXIDE 1200 MG/15ML
LIQUID ORAL
Status: DISPENSED
Start: 2018-09-19 | End: 2018-09-19

## 2018-09-19 RX ORDER — ESCITALOPRAM OXALATE 20 MG/1
20 TABLET ORAL DAILY
Status: DISCONTINUED | OUTPATIENT
Start: 2018-09-19 | End: 2018-09-21 | Stop reason: HOSPADM

## 2018-09-19 RX ORDER — OXYBUTYNIN CHLORIDE 5 MG/1
5 TABLET ORAL 2 TIMES DAILY
Status: DISCONTINUED | OUTPATIENT
Start: 2018-09-19 | End: 2018-09-21 | Stop reason: HOSPADM

## 2018-09-19 RX ORDER — PANTOPRAZOLE SODIUM 40 MG/1
40 TABLET, DELAYED RELEASE ORAL
Status: DISCONTINUED | OUTPATIENT
Start: 2018-09-19 | End: 2018-09-21 | Stop reason: HOSPADM

## 2018-09-19 RX ORDER — DIPHENHYDRAMINE HCL 25 MG
25 TABLET ORAL EVERY 6 HOURS PRN
Status: DISCONTINUED | OUTPATIENT
Start: 2018-09-19 | End: 2018-09-21 | Stop reason: HOSPADM

## 2018-09-19 RX ORDER — TOPIRAMATE 25 MG/1
25 TABLET ORAL 2 TIMES DAILY
Status: DISCONTINUED | OUTPATIENT
Start: 2018-09-19 | End: 2018-09-21 | Stop reason: HOSPADM

## 2018-09-19 RX ORDER — OMEPRAZOLE 20 MG/1
20 CAPSULE, DELAYED RELEASE ORAL DAILY
Status: DISCONTINUED | OUTPATIENT
Start: 2018-09-19 | End: 2018-09-19 | Stop reason: CLARIF

## 2018-09-19 RX ORDER — BUPROPION HYDROCHLORIDE 300 MG/1
300 TABLET ORAL DAILY
Status: DISCONTINUED | OUTPATIENT
Start: 2018-09-19 | End: 2018-09-21 | Stop reason: HOSPADM

## 2018-09-19 RX ADMIN — VANCOMYCIN HYDROCHLORIDE 1250 MG: 5 INJECTION, POWDER, LYOPHILIZED, FOR SOLUTION INTRAVENOUS at 01:49

## 2018-09-19 RX ADMIN — HYDROMORPHONE HYDROCHLORIDE 0.5 MG: 1 INJECTION, SOLUTION INTRAMUSCULAR; INTRAVENOUS; SUBCUTANEOUS at 07:55

## 2018-09-19 RX ADMIN — HYDROMORPHONE HYDROCHLORIDE 0.5 MG: 1 INJECTION, SOLUTION INTRAMUSCULAR; INTRAVENOUS; SUBCUTANEOUS at 04:46

## 2018-09-19 RX ADMIN — Medication 10 ML: at 20:25

## 2018-09-19 RX ADMIN — PIPERACILLIN SODIUM,TAZOBACTAM SODIUM 3.38 G: 3; .375 INJECTION, POWDER, FOR SOLUTION INTRAVENOUS at 20:27

## 2018-09-19 RX ADMIN — PIPERACILLIN SODIUM,TAZOBACTAM SODIUM 3.38 G: 3; .375 INJECTION, POWDER, FOR SOLUTION INTRAVENOUS at 01:49

## 2018-09-19 RX ADMIN — OXYBUTYNIN CHLORIDE 5 MG: 5 TABLET ORAL at 20:23

## 2018-09-19 RX ADMIN — ENOXAPARIN SODIUM 40 MG: 40 INJECTION SUBCUTANEOUS at 07:55

## 2018-09-19 RX ADMIN — BUPROPION HYDROCHLORIDE 300 MG: 300 TABLET, FILM COATED, EXTENDED RELEASE ORAL at 14:27

## 2018-09-19 RX ADMIN — HYDROMORPHONE HYDROCHLORIDE 0.5 MG: 1 INJECTION, SOLUTION INTRAMUSCULAR; INTRAVENOUS; SUBCUTANEOUS at 10:53

## 2018-09-19 RX ADMIN — VANCOMYCIN HYDROCHLORIDE 1250 MG: 5 INJECTION, POWDER, LYOPHILIZED, FOR SOLUTION INTRAVENOUS at 09:19

## 2018-09-19 RX ADMIN — OXYBUTYNIN CHLORIDE 5 MG: 5 TABLET ORAL at 14:40

## 2018-09-19 RX ADMIN — PIPERACILLIN SODIUM,TAZOBACTAM SODIUM 3.38 G: 3; .375 INJECTION, POWDER, FOR SOLUTION INTRAVENOUS at 11:20

## 2018-09-19 RX ADMIN — HYDROMORPHONE HYDROCHLORIDE 0.5 MG: 1 INJECTION, SOLUTION INTRAMUSCULAR; INTRAVENOUS; SUBCUTANEOUS at 14:27

## 2018-09-19 RX ADMIN — VANCOMYCIN HYDROCHLORIDE 1250 MG: 5 INJECTION, POWDER, LYOPHILIZED, FOR SOLUTION INTRAVENOUS at 17:21

## 2018-09-19 RX ADMIN — HYDROMORPHONE HYDROCHLORIDE 0.5 MG: 1 INJECTION, SOLUTION INTRAMUSCULAR; INTRAVENOUS; SUBCUTANEOUS at 01:47

## 2018-09-19 RX ADMIN — Medication 10 ML: at 07:56

## 2018-09-19 RX ADMIN — FUROSEMIDE 20 MG: 10 INJECTION, SOLUTION INTRAVENOUS at 17:21

## 2018-09-19 RX ADMIN — FUROSEMIDE 20 MG: 10 INJECTION, SOLUTION INTRAVENOUS at 07:55

## 2018-09-19 RX ADMIN — HYDROMORPHONE HYDROCHLORIDE 0.5 MG: 1 INJECTION, SOLUTION INTRAMUSCULAR; INTRAVENOUS; SUBCUTANEOUS at 17:21

## 2018-09-19 RX ADMIN — ESCITALOPRAM OXALATE 20 MG: 20 TABLET ORAL at 14:27

## 2018-09-19 RX ADMIN — TOPIRAMATE 25 MG: 25 TABLET, FILM COATED ORAL at 17:22

## 2018-09-19 RX ADMIN — HYDROMORPHONE HYDROCHLORIDE 0.5 MG: 1 INJECTION, SOLUTION INTRAMUSCULAR; INTRAVENOUS; SUBCUTANEOUS at 20:24

## 2018-09-19 ASSESSMENT — PAIN DESCRIPTION - ORIENTATION
ORIENTATION: RIGHT;LEFT

## 2018-09-19 ASSESSMENT — PAIN DESCRIPTION - LOCATION
LOCATION: LEG

## 2018-09-19 ASSESSMENT — PAIN DESCRIPTION - PAIN TYPE
TYPE: ACUTE PAIN

## 2018-09-19 ASSESSMENT — PAIN SCALES - GENERAL
PAINLEVEL_OUTOF10: 10
PAINLEVEL_OUTOF10: 10
PAINLEVEL_OUTOF10: 3
PAINLEVEL_OUTOF10: 10
PAINLEVEL_OUTOF10: 10
PAINLEVEL_OUTOF10: 3
PAINLEVEL_OUTOF10: 9
PAINLEVEL_OUTOF10: 10
PAINLEVEL_OUTOF10: 9

## 2018-09-19 ASSESSMENT — ENCOUNTER SYMPTOMS
GASTROINTESTINAL NEGATIVE: 1
RESPIRATORY NEGATIVE: 1

## 2018-09-19 NOTE — CARE COORDINATION
Pt asked to see LSW and said to make her first choice [facility]. BONG spoke with Rona Hector at [place] and she will look over info. .Electronically signed by ZAN Raymond on 9/19/2018 at 2:57 PM

## 2018-09-19 NOTE — PROGRESS NOTES
PODIATRIC MEDICINE AND SURGERY  Progress Note      ASSESSMENT:  B/L venous stasis ulceration  Cellulitis- increased today  Edema  Morbid Obesity    Plan:  -Exam and evaluation  - Patient refusing dressing change currently, asks to try this afternoon  - Recommend ABDx, Kerlix to b/l lower extremity  -Again discussed with patient need for elevation of lower extremity to heart level or above for edema management. Advised patient to keep legs at or above heart level as much as possible  - Patient will attempt laying in bed again today  -Antibiotics per primary team/infectious disease.   -Pain management per ED/Primary team  -Recommend SNF placement for assistance with edema control, dressing changes, and pain management  -Alternatively consider referral to Palliative medicine for management of lower extremity ulcerations which are unlikely to heal due to patient non-compliance/comorbidities. Palliative care referral has not yet been discussed with patient by Podiatry at this point in time.  -Will continue to follow while in house  -Patient was discussed with Dr. Michelle Mullins who will provide final recommendations moving forward      Interval HPI: This very pleasant 53y.o. year old female with PMH of below listed conditions seen today for b/l venous stasis ulcerations. Patient states she was unable to tolerate laying in bed last night. Patient states she felt as though her bottom hit the board and she was claustrophobic. Patient is seen sitting with legs elevated on foot stool. She denies any fevers, chills, nausea or vomiting      Past Medical History:   Diagnosis Date    Morbid obesity ([address]     Overactive bladder     Psychiatric problem        Past Surgical History:   Procedure Laterality Date    ABDOMEN SURGERY      gastric bypass surgery    BACK SURGERY      CHOLECYSTECTOMY         No current facility-administered medications on file prior to encounter.       Current Outpatient Prescriptions on File Prior to [[id number]] Collected: 09/18/18 1722   Order Status: Completed Specimen: Ulcer Updated: 09/19/18 1145    WOUND/ABSCESS --    Growth present in less than 12 hours. Unable to identify   organisms at present time, culture reincubated. Further   results to follow in 24 hours. Gram Stain Result Moderate WBC's   Few Gram positive cocci   Moderate Gram negative rods    Narrative:     ORDER#: [id number]                          ORDERED BY:  SOURCE: Ulcer                              COLLECTED:  09/18/18 17:22  [facility] AT [facility]. :                      RECEIVED :  09/18/18 17:22           IMAGING:   X-ray of left tibia and fibula 09/18/19 as read by Radiologist  Narrative   EXAMINATION: Left leg. 3 films.       CLINICAL HISTORY: Pain and swelling of the soft tissues of the left leg.       FINDINGS: AP and lateral views reveal similar findings on the left side compared to the right. There is diffuse soft tissue swelling and generalized osteopenia. No bony focal lesions or destructive process. No evidence of periostitis.       Soft tissues are swollen and edematous but no discrete mass. There is no gas in the soft tissues.       Osteoarthritis involving the right knee is significant laterally and at the patellofemoral compartment. Mild degenerative changes at the ankle.           Impression   SOFT TISSUE SWELLING. OSTEOPENIA. BONES INTACT.       OSTEOARTHRITIS. Patient's case will be discussed with staff, who will provide final recommendations. Thank you for the consult. Vickie Li, PGY3  Please first page Podiat[facility] On Call, [phone]  September 19, 2018  12:56 PM    The resident/student was under my direct supervision during today's patient encounter. reflection of my personal examination, assessment and treatment plan. The patient was seen and examined with the resident/student. The above findings and recommendations were reviewed and I agree with above treatment plan.   Any questions or concerns,

## 2018-09-19 NOTE — CARE COORDINATION
Update note. .. [facility] and [facility] does not take [place]. [facility] has no beds. Pt then selected [facility]. Calos Carmona Electronically signed by ZAN Reese on 9/19/2018 at 3:16 PM

## 2018-09-19 NOTE — PROGRESS NOTES
Physician Progress Note    2018   1:05 PM    Name:  Alexis Watts  MRN:    [id number]     IP Day: 1     Admit Date: 2018  3:24 PM  PCP: Warren Crabtree DO    Code Status:  Full Code    Subjective:      no new events. C/o redness and pain in her legs. No fever or chills. Physical Examination:      Vitals:  BP (!) 91/33   Pulse 108   Temp 97.9 °F (36.6 °C) (Oral)   Resp 18   Ht 5' 5\" (1.651 m)   Wt (!) 500 lb (226.8 kg)   LMP  (LMP Unknown)   SpO2 96%   BMI 83.20 kg/m²   Temp (24hrs), Av.9 °F (36.6 °C), Min:97.9 °F (36.6 °C), Max:97.9 °F (36.6 °C)      General appearance: alert, cooperative and no distress.  Morbid obesity   Mental Status: oriented to person, place and time and normal affect  Lungs: clear to auscultation bilaterally, normal effort  Heart: regular rate and rhythm, no murmur  Abdomen: soft, nontender, nondistended, bowel sounds present, no masses  Extremities: leg ulcers with purulent discharge with surrounding erythema on anterior legs b/l worse on left  Skin: no gross lesions, rashes    Data:     Labs:  Recent Labs      18   1615  18   0659   WBC  8.2  8.7   HGB  14.3  13.8   PLT  205  198     Recent Labs      18   1852  18   0659   NA  140  135   K  4.0  4.1   CL  98  96*   CO2  30*  28   BUN  21*  21*   CREATININE  0.72  0.69   GLUCOSE  110*  117*     Recent Labs      18   1852  18   0659   AST  12  13   ALT  10  9   BILITOT  <0.2  0.5   ALKPHOS  82  65       Current Facility-Administered Medications   Medication Dose Route Frequency Provider Last Rate Last Dose    sodium chloride 0.9 % irrigation             buPROPion (WELLBUTRIN XL) extended release tablet 300 mg  300 mg Oral Daily Bonilla Ryan DO        escitalopram (LEXAPRO) tablet 20 mg  20 mg Oral Daily Bonilla Ryan DO        omeprazole [facility]) delayed release capsule 20 mg  20 mg Oral Daily Bonilla Ryan DO        oxybutynin (DITROPAN) tablet 5 mg  5 mg Oral BID Jeremy Cabral cultures     # morbid obesity   - supportive care     # depression   - resume Wellbutrin     DVT/PPx- ordered if appropriate        DISCHARGE PLANNING  Pending above        Electronically signed by Hilda Wallis DO on 9/19/2018 at 1:05 PM

## 2018-09-19 NOTE — PROGRESS NOTES
Patient rested in bed for 2 hours with legs elevated. Patient back to chair now, more comfortable. Pt agreed to allow me to wrap legs. ABDs and Kerlix applied.  Electronically signed by Isaiah Martinez RN on 9/19/2018 at 4:15 PM

## 2018-09-19 NOTE — PROGRESS NOTES
Patient admission and assessment completed. Patient continues to have pain in lower legs. She said that podiatry attempted to wrap but was too painful. She ambulated from wheelchair to chair in room with little difficulty. Call light in reach and bed in lowest position.    Electronically signed by Rudene Severance, RN on 9/18/2018 at 9:20 PM

## 2018-09-19 NOTE — CARE COORDINATION
Met with patient, freedom of choice offered. [facility] podiatry would like her at a facility for wound healing, snf list given, will follow.

## 2018-09-19 NOTE — PROGRESS NOTES
Pharmacy Note  Vancomycin Consult    Alexis Watts is a 53 y.o. female started on Vancomycin for cellulitis; consult received from LAUREN ALCANTAR  to manage therapy. Also receiving the following antibiotics: Zosyn. Patient Active Problem List   Diagnosis    Morbid obesity due to excess calories ([address]    Venous stasis ulcer of right calf with fat layer exposed with varicose veins (HCC)    Venous stasis ulcer of left calf (HCC)    Venous insufficiency of both lower extremities    Lymph edema    Cellulitis       Allergies:  Keflex [cephalexin]         Recent Labs      09/18/18   1615  09/18/18   1852   BUN  20  21*       Recent Labs      09/18/18   1615  09/18/18   1852   CREATININE  0.69  0.72       Recent Labs      09/18/18   1615   WBC  8.2         Intake/Output Summary (Last 24 hours) at 09/18/18 2121  Last data filed at 09/18/18 1905   Gross per 24 hour   Intake 1050 ml   Output 0 ml   Net 1050 ml           Ht Readings from Last 1 Encounters:   09/18/18 5' 5\" (1.651 m)        Wt Readings from Last 1 Encounters:   09/18/18 (!) 500 lb (226.8 kg)         Body mass index is 83.2 kg/m². Estimated Creatinine Clearance: 190 mL/min (based on SCr of 0.72 mg/dL). Goal Trough Level: 10-20 mcg/mL    Assessment/Plan:  Will initiate vancomycin 1250 mg IV every 8 hours. Dose is only approx 6mg/kg, but patient is obese. A dose of 2000mg x1 was given in ER. Timing of trough level will be determined based on culture results, renal function, and clinical response. Trough prior to 4th total dose (9-19-18 prior to 1800 dose). Thank you for the consult. Will continue to follow.      Ivan Tirado [facility]  09/18/18  9:26 PM

## 2018-09-19 NOTE — PROGRESS NOTES
Patient sitting up in chair. Legs very warm and swollen 3+ edema. Open areas to bilateral lower legs weeping. Legs elevated on stools. Legs painful to touch. PRN pain medication administered. Legs too painful for podiatry to wrap last night. Legs still open to air with pads underneath. IV antibiotics infusing. Safety maintianed. Call light in reach.  Electronically signed by Susie Sanches RN on 9/19/2018 at 11:07 AM

## 2018-09-19 NOTE — CARE COORDINATION
BONG spoke to Pt, she is interested in SNF at [place] [id number]. LSW made the ref and awaiting to see if they have beds. Calos Carmona Electronically signed by ZAN Reese on 9/19/2018 at 2:47 PM

## 2018-09-19 NOTE — CONSULTS
Patient Active Problem List   Diagnosis    Morbid obesity due to excess calories (HCC)    Venous stasis ulcer of right calf with fat layer exposed with varicose veins (HCC)    Venous stasis ulcer of left calf (HCC)    Venous insufficiency of both lower extremities    Lymph edema    Cellulitis         PLAN:    Cellulitis left leg infected wound left leg    Daily vancomycin and Zosyn at this point if no MRSA grows from the wound will discontinue Vanco

## 2018-09-19 NOTE — ED NOTES
Patient report called to Enrique Murphy RN on 4W at this time. No distress noted from patient. Patient assisted to restroom via wheelchair. IV vanco continues to infuse without issue.       Alpa Perez RN  09/18/18 2025

## 2018-09-20 PROCEDURE — 2580000003 HC RX 258: Performed by: PHYSICIAN ASSISTANT

## 2018-09-20 PROCEDURE — G8978 MOBILITY CURRENT STATUS: HCPCS

## 2018-09-20 PROCEDURE — 6370000000 HC RX 637 (ALT 250 FOR IP): Performed by: INTERNAL MEDICINE

## 2018-09-20 PROCEDURE — 6360000002 HC RX W HCPCS: Performed by: INTERNAL MEDICINE

## 2018-09-20 PROCEDURE — 1210000000 HC MED SURG R&B

## 2018-09-20 PROCEDURE — G8979 MOBILITY GOAL STATUS: HCPCS

## 2018-09-20 PROCEDURE — 97167 OT EVAL HIGH COMPLEX 60 MIN: CPT

## 2018-09-20 PROCEDURE — 99232 SBSQ HOSP IP/OBS MODERATE 35: CPT | Performed by: INTERNAL MEDICINE

## 2018-09-20 PROCEDURE — G8988 SELF CARE GOAL STATUS: HCPCS

## 2018-09-20 PROCEDURE — 6370000000 HC RX 637 (ALT 250 FOR IP): Performed by: PHYSICIAN ASSISTANT

## 2018-09-20 PROCEDURE — 97162 PT EVAL MOD COMPLEX 30 MIN: CPT

## 2018-09-20 PROCEDURE — 6360000002 HC RX W HCPCS: Performed by: PHYSICIAN ASSISTANT

## 2018-09-20 PROCEDURE — G8987 SELF CARE CURRENT STATUS: HCPCS

## 2018-09-20 RX ADMIN — Medication 10 ML: at 20:41

## 2018-09-20 RX ADMIN — OXYBUTYNIN CHLORIDE 5 MG: 5 TABLET ORAL at 08:09

## 2018-09-20 RX ADMIN — OXYCODONE HYDROCHLORIDE AND ACETAMINOPHEN 2 TABLET: 5; 325 TABLET ORAL at 11:40

## 2018-09-20 RX ADMIN — VANCOMYCIN HYDROCHLORIDE 1250 MG: 5 INJECTION, POWDER, LYOPHILIZED, FOR SOLUTION INTRAVENOUS at 01:54

## 2018-09-20 RX ADMIN — TOPIRAMATE 25 MG: 25 TABLET, FILM COATED ORAL at 08:07

## 2018-09-20 RX ADMIN — PIPERACILLIN SODIUM,TAZOBACTAM SODIUM 3.38 G: 3; .375 INJECTION, POWDER, FOR SOLUTION INTRAVENOUS at 04:27

## 2018-09-20 RX ADMIN — VANCOMYCIN HYDROCHLORIDE 1250 MG: 5 INJECTION, POWDER, LYOPHILIZED, FOR SOLUTION INTRAVENOUS at 17:40

## 2018-09-20 RX ADMIN — PIPERACILLIN SODIUM,TAZOBACTAM SODIUM 3.38 G: 3; .375 INJECTION, POWDER, FOR SOLUTION INTRAVENOUS at 13:08

## 2018-09-20 RX ADMIN — HYDROMORPHONE HYDROCHLORIDE 0.5 MG: 1 INJECTION, SOLUTION INTRAMUSCULAR; INTRAVENOUS; SUBCUTANEOUS at 00:11

## 2018-09-20 RX ADMIN — OXYCODONE HYDROCHLORIDE AND ACETAMINOPHEN 2 TABLET: 5; 325 TABLET ORAL at 16:25

## 2018-09-20 RX ADMIN — PIPERACILLIN SODIUM,TAZOBACTAM SODIUM 3.38 G: 3; .375 INJECTION, POWDER, FOR SOLUTION INTRAVENOUS at 20:41

## 2018-09-20 RX ADMIN — ENOXAPARIN SODIUM 40 MG: 40 INJECTION SUBCUTANEOUS at 08:06

## 2018-09-20 RX ADMIN — FUROSEMIDE 20 MG: 10 INJECTION, SOLUTION INTRAVENOUS at 17:40

## 2018-09-20 RX ADMIN — VANCOMYCIN HYDROCHLORIDE 1250 MG: 5 INJECTION, POWDER, LYOPHILIZED, FOR SOLUTION INTRAVENOUS at 11:46

## 2018-09-20 RX ADMIN — TOPIRAMATE 25 MG: 25 TABLET, FILM COATED ORAL at 17:40

## 2018-09-20 RX ADMIN — ESCITALOPRAM OXALATE 20 MG: 20 TABLET ORAL at 08:07

## 2018-09-20 RX ADMIN — BUPROPION HYDROCHLORIDE 300 MG: 300 TABLET, FILM COATED, EXTENDED RELEASE ORAL at 08:07

## 2018-09-20 RX ADMIN — OXYBUTYNIN CHLORIDE 5 MG: 5 TABLET ORAL at 22:28

## 2018-09-20 RX ADMIN — HYDROMORPHONE HYDROCHLORIDE 0.5 MG: 1 INJECTION, SOLUTION INTRAMUSCULAR; INTRAVENOUS; SUBCUTANEOUS at 04:29

## 2018-09-20 RX ADMIN — FUROSEMIDE 20 MG: 10 INJECTION, SOLUTION INTRAVENOUS at 08:22

## 2018-09-20 RX ADMIN — HYDROMORPHONE HYDROCHLORIDE 0.5 MG: 1 INJECTION, SOLUTION INTRAMUSCULAR; INTRAVENOUS; SUBCUTANEOUS at 08:23

## 2018-09-20 RX ADMIN — OXYCODONE HYDROCHLORIDE AND ACETAMINOPHEN 2 TABLET: 5; 325 TABLET ORAL at 20:41

## 2018-09-20 RX ADMIN — Medication 10 ML: at 08:12

## 2018-09-20 ASSESSMENT — PAIN DESCRIPTION - ORIENTATION
ORIENTATION: RIGHT;LEFT

## 2018-09-20 ASSESSMENT — PAIN SCALES - GENERAL
PAINLEVEL_OUTOF10: 10
PAINLEVEL_OUTOF10: 9
PAINLEVEL_OUTOF10: 10
PAINLEVEL_OUTOF10: 9
PAINLEVEL_OUTOF10: 10

## 2018-09-20 ASSESSMENT — PAIN DESCRIPTION - PAIN TYPE
TYPE: ACUTE PAIN

## 2018-09-20 ASSESSMENT — PAIN DESCRIPTION - LOCATION
LOCATION: LEG

## 2018-09-20 ASSESSMENT — PAIN DESCRIPTION - DESCRIPTORS: DESCRIPTORS: ACHING

## 2018-09-20 NOTE — PROGRESS NOTES
Physician Progress Note    2018   1:38 PM    Name:  Tamia Cervantes  MRN:    [id number]     IP Day: 2     Admit Date: 2018  3:24 PM  PCP: Timur King DO    Code Status:  Full Code    Subjective:      no new events. C/o redness and pain in her legs. No fever or chills. Physical Examination:      Vitals:  BP (!) 119/58   Pulse 101   Temp 98.1 °F (36.7 °C) (Oral)   Resp 17   Ht 5' 5\" (1.651 m)   Wt (!) 500 lb (226.8 kg)   LMP  (LMP Unknown)   SpO2 94%   BMI 83.20 kg/m²   Temp (24hrs), Av.9 °F (36.6 °C), Min:97.5 °F (36.4 °C), Max:98.2 °F (36.8 °C)      General appearance: alert, cooperative and no distress.  Morbid obesity   Mental Status: oriented to person, place and time and normal affect  Lungs: clear to auscultation bilaterally, normal effort  Heart: regular rate and rhythm, no murmur  Abdomen: soft, nontender, nondistended, bowel sounds present, no masses  Extremities: leg ulcers with purulent discharge with surrounding erythema on anterior legs b/l worse on left  Skin: no gross lesions, rashes    Data:     Labs:  Recent Labs      18   1615  18   0659   WBC  8.2  8.7   HGB  14.3  13.8   PLT  205  198     Recent Labs      18   1852  18   0659   NA  140  135   K  4.0  4.1   CL  98  96*   CO2  30*  28   BUN  21*  21*   CREATININE  0.72  0.69   GLUCOSE  110*  117*     Recent Labs      18   1852  18   0659   AST  12  13   ALT  10  9   BILITOT  <0.2  0.5   ALKPHOS  82  65       Current Facility-Administered Medications   Medication Dose Route Frequency Provider Last Rate Last Dose    buPROPion (WELLBUTRIN XL) extended release tablet 300 mg  300 mg Oral Daily Pitney Loren, DO   300 mg at 18 7231    escitalopram (LEXAPRO) tablet 20 mg  20 mg Oral Daily Pitney Loren, DO   20 mg at 18 3668    oxybutynin (DITROPAN) tablet 5 mg  5 mg Oral BID Anders Pimentel DO   5 mg at 18 0809    topiramate (TOPAMAX) tablet 25 mg  25 mg Oral BID Shaan Aviles

## 2018-09-20 NOTE — PROGRESS NOTES
Cellulitis left leg infected wound left leg       Chronic venous stasis with ulceration  Increasing burning and pain over the few days prior to admission  Increased drainage from the wound  Has been taking antibiotics on and off for the past month       No fevers chills sweats  Respiratory: Negative. Cardiovascular: Positive for leg swelling. Gastrointestinal: Negative. Genitourinary: Negative. Musculoskeletal: Negative. Skin: Positive for rash.           BP (!) 119/58   Pulse 101   Temp 98.1 °F (36.7 °C) (Oral)   Resp 17   Ht 5' 5\" (1.651 m)   Wt (!) 500 lb (226.8 kg)   LMP  (LMP Unknown)   SpO2 94%   BMI 83.20 kg/m²     Cardiovascular: Normal heart sounds and intact distal pulses. Exam reveals no gallop and no friction rub. No murmur heard. Pulmonary/Chest: Effort normal and breath sounds normal. No respiratory distress. She has no wheezes. She has no rales. She exhibits no tenderness. Abdominal: Soft. Bowel sounds are normal. She exhibits no distension and no mass. There is no hepatosplenomegaly, splenomegaly or hepatomegaly. There is no tenderness. There is no rebound, no guarding and no CVA tenderness. Musculoskeletal: She exhibits edema and tenderness. Skin: Skin is warm.  There is erythema.                WOUND CULTURE [[id number]] (Abnormal) Collected: 09/18/18 1722   Order Status: Completed Specimen: Ulcer Updated: 09/20/18 1143    WOUND/ABSCESS --    Gram Stain Result Moderate WBC's   Few Gram positive cocci   Moderate Gram negative rods    (A)    Organism Gram negative dorothy (A)    WOUND/ABSCESS --    Heavy growth   ID and sensitivity to follow     Organism Gram negative dorothy (A)              Patient Active Problem List   Diagnosis    Morbid obesity due to excess calories (HCC)    Venous stasis ulcer of right calf with fat layer exposed with varicose veins (HCC)    Venous stasis ulcer of left calf (HCC)    Venous insufficiency of both lower extremities    Lymph edema    Cellulitis            PLAN:     Cellulitis left leg infected wound left leg     Daily vancomycin and Zosyn at this point if no MRSA grows from the wound will discontinue Vanco

## 2018-09-20 NOTE — CARE COORDINATION
PLAN REMAINS DC TO [facility], WILL NEED PRE CERT, AWAITING FINAL WOUND CULTURES TO DETERMINE PLAN OF CARE.

## 2018-09-20 NOTE — PROGRESS NOTES
[facility] OCCUPATIONAL THERAPY EVALUATION - ACUTE     Date: 2018  Patient Name: Misty Portillo        MRN: [id number]  Account: [de-identified]   : 1970  (53 y.o.)  [facility]    Chart Review:  Diagnosis:  The primary encounter diagnosis was Multiple open wounds of lower leg, unspecified laterality, initial encounter. Diagnoses of Bilateral cellulitis of lower leg, Failure of outpatient treatment, Morbid obesity due to excess calories ([address] Intractable pain, and Elevated C-reactive protein (CRP) were also pertinent to this visit. Past Medical History:   Diagnosis Date    Morbid obesity ([address]     Overactive bladder     Psychiatric problem      Past Surgical History:   Procedure Laterality Date    ABDOMEN SURGERY      gastric bypass surgery    BACK SURGERY      CHOLECYSTECTOMY       Precautions:  Fall,IV       Evaluation and Pt. rights have been reviewed: [x]Yes   [] No   If no why not:   Falls safety interventions in place  [x]Yes   [] No    Comments:     Subjective: \"I want to get my life back. \"    Prior living arrangement:      Support contact: Spouse    Pt lives: [] Alone   [x] With spouse   [] Other   Comment:    Home: [x] Single level   []  Two level   []  Split level     []  Apartment:     Entrance:  Stairs: 3 Hand rails 1 ,    Inside: Stairs:   Hand rails:    Bathroom: [] Bath tub   [x] Tub/Shower combo   []  Shower stall    Location:      DME: [] W/W   [] Garima Keyes   [x] Rollator   [x]  W/C   [] Shabana [place]   [] Shower Chair   [] [facility]  [] Dressing  AE  [] Other:      Previous Functional Status:  Pt reports independent with adl self care and spouse completes homemaking    Pain:   Start of session: 10/10  Description: hurts  Location: LE's  End of session: 10/10  Action: [] No Action Necessary    [x] Patient reports pain at acceptable level for treatment  [] Nursing notified    [] Other      Objective:  Observation:  Pt seated in bed side chair    Orientation: Oriented to  [x] Person   [x] Place 20    Electronically signed by:    Kristen Ge OTR/L  5/76/2967, 2:21 PM Electronically signed by Kristen Ge OTR/L on 4/47/86 at 2:30 PM

## 2018-09-20 NOTE — PROGRESS NOTES
Vision/Hearing:  Vision: Within Functional Limits  Hearing: Within functional limits    Cognition:  Overall Orientation Status: Within Normal Limits  Follows Commands: Within Functional Limits    Observation/Palpation  Posture: Fair (rounded shoulders)    ROM:  RLE AROM: WFL  LLE AROM : WFL    Strength:  Strength RLE  Comment: grossly 3+/5  Strength LLE  Comment: grossly 3+/5    Neuro:  Balance  Sitting - Static: Good  Sitting - Dynamic: Fair  Standing - Static: Fair  Standing - Dynamic: Fair        Sensation  Overall Sensation Status: WNL    Bed mobility  Comment: NT due to patient sitting in bedside chair    Transfers  Sit to Stand: Minimal Assistance  Stand to sit: Minimal Assistance    Ambulation  Ambulation?: Yes  Ambulation 1  Surface: level tile  Device:  (handheld assist)  Assistance: Minimal assistance  Quality of Gait: unsteady, SOB  Distance: 15 ft         ASSESSMENT:   Body structures, Functions, Activity limitations: Decreased functional mobility ; Decreased strength;Decreased endurance;Decreased balance;Decreased ADL status  Decision Making: Medium Complexity  History: medium  Exam: high  Clinical Presentation: evolving    Prognosis: Good  Patient Education: goals of PT    DISCHARGE RECOMMENDATIONS:  Discharge Recommendations: Continue to assess pending progress    Assessment: Patient demonstrates decline in functional mobility and endurance. Further inpatient PT recommended prior to discharge home. REQUIRES PT FOLLOW UP: Yes      PLAN OF CARE:  Plan  Times per week: 3-6  Safety Devices  Type of devices: All fall risk precautions in place    G-Code:  PT G-Codes  Functional Assessment Tool Used: clinical judgement  Functional Limitation: Mobility: Walking and moving around  Mobility: Walking and Moving Around Current Status (): At least 40 percent but less than 60 percent impaired, limited or restricted  Mobility: Walking and Moving Around Goal Status ():  At least 20 percent but less than

## 2018-09-21 VITALS
OXYGEN SATURATION: 94 % | SYSTOLIC BLOOD PRESSURE: 145 MMHG | TEMPERATURE: 97.6 F | HEIGHT: 65 IN | BODY MASS INDEX: 48.82 KG/M2 | HEART RATE: 95 BPM | WEIGHT: 293 LBS | RESPIRATION RATE: 17 BRPM | DIASTOLIC BLOOD PRESSURE: 74 MMHG

## 2018-09-21 LAB
ANION GAP SERPL CALCULATED.3IONS-SCNC: 14 MEQ/L (ref 7–13)
BASOPHILS ABSOLUTE: 0 K/UL (ref 0–0.2)
BASOPHILS RELATIVE PERCENT: 0.5 %
BUN BLDV-MCNC: 16 MG/DL (ref 6–20)
CALCIUM SERPL-MCNC: 8.8 MG/DL (ref 8.6–10.2)
CHLORIDE BLD-SCNC: 95 MEQ/L (ref 98–107)
CO2: 27 MEQ/L (ref 22–29)
CREAT SERPL-MCNC: 0.68 MG/DL (ref 0.5–0.9)
EOSINOPHILS ABSOLUTE: 0.2 K/UL (ref 0–0.7)
EOSINOPHILS RELATIVE PERCENT: 2.1 %
GFR AFRICAN AMERICAN: >60
GFR NON-AFRICAN AMERICAN: >60
GLUCOSE BLD-MCNC: 128 MG/DL (ref 74–109)
GRAM STAIN RESULT: ABNORMAL
HCT VFR BLD CALC: 40.4 % (ref 37–47)
HEMOGLOBIN: 13.5 G/DL (ref 12–16)
LYMPHOCYTES ABSOLUTE: 1.4 K/UL (ref 1–4.8)
LYMPHOCYTES RELATIVE PERCENT: 17.7 %
MCH RBC QN AUTO: 26.3 PG (ref 27–31.3)
MCHC RBC AUTO-ENTMCNC: 33.4 % (ref 33–37)
MCV RBC AUTO: 78.9 FL (ref 82–100)
MONOCYTES ABSOLUTE: 0.7 K/UL (ref 0.2–0.8)
MONOCYTES RELATIVE PERCENT: 8.9 %
NEUTROPHILS ABSOLUTE: 5.7 K/UL (ref 1.4–6.5)
NEUTROPHILS RELATIVE PERCENT: 70.8 %
ORGANISM: ABNORMAL
ORGANISM: ABNORMAL
PDW BLD-RTO: 15.8 % (ref 11.5–14.5)
PLATELET # BLD: 198 K/UL (ref 130–400)
POTASSIUM SERPL-SCNC: 3.2 MEQ/L (ref 3.5–5.1)
RBC # BLD: 5.12 M/UL (ref 4.2–5.4)
SODIUM BLD-SCNC: 136 MEQ/L (ref 132–144)
WBC # BLD: 8.1 K/UL (ref 4.8–10.8)
WOUND/ABSCESS: ABNORMAL

## 2018-09-21 PROCEDURE — 6360000002 HC RX W HCPCS: Performed by: INTERNAL MEDICINE

## 2018-09-21 PROCEDURE — 99232 SBSQ HOSP IP/OBS MODERATE 35: CPT | Performed by: INTERNAL MEDICINE

## 2018-09-21 PROCEDURE — 80048 BASIC METABOLIC PNL TOTAL CA: CPT

## 2018-09-21 PROCEDURE — 6370000000 HC RX 637 (ALT 250 FOR IP): Performed by: INTERNAL MEDICINE

## 2018-09-21 PROCEDURE — 36415 COLL VENOUS BLD VENIPUNCTURE: CPT

## 2018-09-21 PROCEDURE — 6360000002 HC RX W HCPCS: Performed by: PHYSICIAN ASSISTANT

## 2018-09-21 PROCEDURE — 6370000000 HC RX 637 (ALT 250 FOR IP): Performed by: PHYSICIAN ASSISTANT

## 2018-09-21 PROCEDURE — 2580000003 HC RX 258: Performed by: PHYSICIAN ASSISTANT

## 2018-09-21 PROCEDURE — 85025 COMPLETE CBC W/AUTO DIFF WBC: CPT

## 2018-09-21 RX ORDER — POTASSIUM CHLORIDE 20 MEQ/1
40 TABLET, EXTENDED RELEASE ORAL ONCE
Status: COMPLETED | OUTPATIENT
Start: 2018-09-21 | End: 2018-09-21

## 2018-09-21 RX ORDER — SODIUM CHLORIDE 0.9 % (FLUSH) 0.9 %
10 SYRINGE (ML) INJECTION EVERY 12 HOURS SCHEDULED
Status: DISCONTINUED | OUTPATIENT
Start: 2018-09-21 | End: 2018-09-21 | Stop reason: HOSPADM

## 2018-09-21 RX ORDER — SODIUM CHLORIDE 0.9 % (FLUSH) 0.9 %
10 SYRINGE (ML) INJECTION PRN
Status: DISCONTINUED | OUTPATIENT
Start: 2018-09-21 | End: 2018-09-21 | Stop reason: HOSPADM

## 2018-09-21 RX ORDER — OXYCODONE HYDROCHLORIDE AND ACETAMINOPHEN 5; 325 MG/1; MG/1
1 TABLET ORAL EVERY 4 HOURS PRN
Qty: 10 TABLET | Refills: 0 | Status: SHIPPED | OUTPATIENT
Start: 2018-09-21 | End: 2018-09-28

## 2018-09-21 RX ORDER — CIPROFLOXACIN 500 MG/1
500 TABLET, FILM COATED ORAL 2 TIMES DAILY
Qty: 42 TABLET | Refills: 0
Start: 2018-09-21 | End: 2018-10-12

## 2018-09-21 RX ORDER — LIDOCAINE HYDROCHLORIDE 20 MG/ML
5 INJECTION, SOLUTION INFILTRATION; PERINEURAL ONCE
Status: DISCONTINUED | OUTPATIENT
Start: 2018-09-21 | End: 2018-09-21 | Stop reason: HOSPADM

## 2018-09-21 RX ORDER — SODIUM CHLORIDE 9 MG/ML
250 INJECTION, SOLUTION INTRAVENOUS ONCE
Status: DISCONTINUED | OUTPATIENT
Start: 2018-09-21 | End: 2018-09-21

## 2018-09-21 RX ADMIN — ESCITALOPRAM OXALATE 20 MG: 20 TABLET ORAL at 09:01

## 2018-09-21 RX ADMIN — OXYCODONE HYDROCHLORIDE AND ACETAMINOPHEN 2 TABLET: 5; 325 TABLET ORAL at 10:22

## 2018-09-21 RX ADMIN — OXYCODONE HYDROCHLORIDE AND ACETAMINOPHEN 2 TABLET: 5; 325 TABLET ORAL at 01:07

## 2018-09-21 RX ADMIN — TOPIRAMATE 25 MG: 25 TABLET, FILM COATED ORAL at 09:01

## 2018-09-21 RX ADMIN — OXYCODONE HYDROCHLORIDE AND ACETAMINOPHEN 2 TABLET: 5; 325 TABLET ORAL at 15:01

## 2018-09-21 RX ADMIN — FUROSEMIDE 20 MG: 10 INJECTION, SOLUTION INTRAVENOUS at 09:21

## 2018-09-21 RX ADMIN — PIPERACILLIN SODIUM,TAZOBACTAM SODIUM 3.38 G: 3; .375 INJECTION, POWDER, FOR SOLUTION INTRAVENOUS at 05:42

## 2018-09-21 RX ADMIN — OXYCODONE HYDROCHLORIDE AND ACETAMINOPHEN 2 TABLET: 5; 325 TABLET ORAL at 05:44

## 2018-09-21 RX ADMIN — VANCOMYCIN HYDROCHLORIDE 1250 MG: 5 INJECTION, POWDER, LYOPHILIZED, FOR SOLUTION INTRAVENOUS at 03:20

## 2018-09-21 RX ADMIN — VANCOMYCIN HYDROCHLORIDE 1250 MG: 5 INJECTION, POWDER, LYOPHILIZED, FOR SOLUTION INTRAVENOUS at 10:23

## 2018-09-21 RX ADMIN — POTASSIUM CHLORIDE 40 MEQ: 20 TABLET, EXTENDED RELEASE ORAL at 09:06

## 2018-09-21 RX ADMIN — BUPROPION HYDROCHLORIDE 300 MG: 300 TABLET, FILM COATED, EXTENDED RELEASE ORAL at 09:01

## 2018-09-21 RX ADMIN — Medication 10 ML: at 09:06

## 2018-09-21 RX ADMIN — ENOXAPARIN SODIUM 40 MG: 40 INJECTION SUBCUTANEOUS at 09:00

## 2018-09-21 RX ADMIN — OXYBUTYNIN CHLORIDE 5 MG: 5 TABLET ORAL at 09:01

## 2018-09-21 ASSESSMENT — PAIN SCALES - GENERAL
PAINLEVEL_OUTOF10: 10
PAINLEVEL_OUTOF10: 8
PAINLEVEL_OUTOF10: 10
PAINLEVEL_OUTOF10: 9

## 2018-09-21 NOTE — PROGRESS NOTES
Date    Morbid obesity ([facility] [address]     Overactive bladder     Psychiatric problem        Past Surgical History:   Procedure Laterality Date    ABDOMEN SURGERY      gastric bypass surgery    BACK SURGERY      CHOLECYSTECTOMY         No current facility-administered medications on file prior to encounter. Current Outpatient Prescriptions on File Prior to Encounter   Medication Sig Dispense Refill    SODIUM CHLORIDE, EXTERNAL, 0.9 % SOLN Apply 1 Applicatorful topically daily 1000 mL 5    silver sulfADIAZINE (SILVADENE) 1 % cream Apply topically daily. 400 g 3    meloxicam (MOBIC) 15 MG tablet Take 15 mg by mouth daily      omeprazole (PRILOSEC) 20 MG delayed release capsule Take 20 mg by mouth daily      acetaminophen (TYLENOL) 500 MG tablet Take 1,000 mg by mouth      buPROPion (WELLBUTRIN XL) 300 MG extended release tablet Take 300 mg by mouth      escitalopram (LEXAPRO) 20 MG tablet Take 20 mg by mouth      furosemide (LASIX) 40 MG tablet TAKE ONE TABLET BY MOUTH EVERY DAY AS NEEDED for severe leg swelling      oxybutynin (DITROPAN) 5 MG tablet Take 5 mg by mouth 2 times daily         Allergies   Allergen Reactions    Keflex [Cephalexin] Itching and Rash       History reviewed. No pertinent family history. Social History     Social History    Marital status:      Spouse name: N/A    Number of children: N/A    Years of education: N/A     Occupational History    Not on file.      Social History Main Topics    Smoking status: Never Smoker    Smokeless tobacco: Never Used    Alcohol use Yes      Comment: occasional    Drug use: No    Sexual activity: Not on file     Other Topics Concern    Not on file     Social History Narrative    Lives w              REVIEW OF SYSTEMS:  See HPI      OBJECTIVE:  BP (!) 145/74   Pulse 95   Temp 97.6 °F (36.4 °C) (Oral)   Resp 17   Ht 5' 5\" (1.651 m)   Wt (!) 500 lb (226.8 kg)   LMP  (LMP Unknown)   SpO2 94%   BMI 83.20 kg/m²   Patient is alert and oriented x 3 in NAD. Vascular:  Faintly Palpable Dorsalis Pedis and Faintly Palpable Posterior Tibial Pulses B/L   Capillary Fill time < 3 seconds to B/L digits  Skin temperature warm to warm tibial tuberosity to the digits B/L  Hair growth present to digits  Severe edema, No varicosities     Neurological:   Epicritic sensation intact B/L  Protective sensation via monofilament testing intact B/L    Musculoskeletal/Orthopaedic:   Deferred    Dermatological:   Skin appears well hydrated and supple with good temperature, texture, turgor. No hyperkeratosis  noted. Interspaces 1-4 are clear and without debris B/L. Nails 1 b/l appears thickened, elongated. Nails 2-5 appear elongated  Open lesions present to b/l lower extremity as described below. Ulceration #1: Increased erythema to lower extremity  Location: Left leg  Measurements: ~20 cm x 10 cm x 0.2 cm  Base: Mixed Granular/Fibrotic  Borders: viable without evidence of maceration or hyperkeratosis   Exudate: HEAVY serous exudate  Comments: + erythema extending >5cm beyond borders with no evidence of ascending lymphangitis. Wound does not undermine, does not probe to bone. Ulceration #2: Unchanged  Location: Right anterior leg, two ulcerations adjacent to one another  Measurements: ~3.0 cm x 1.0 cm x 0.3 cm; 2.0cm x 2.0cm x 0.2cm  Base: Mixed Granular/Fibrotic  Borders: Viable, without evidence of hyperkeratosis or maceration   Exudate: HEAVY serous exudate  Comments: Mild erythema extending <2cm beyond borders with no evidence of ascending lymphangitis. Wound does not undermine, does not probe to bone.            LABS:   Lab Results   Component Value Date    WBC 8.1 09/21/2018    HGB 13.5 09/21/2018    HCT 40.4 09/21/2018    MCV 78.9 (L) 09/21/2018     09/21/2018     Lab Results   Component Value Date     09/21/2018    K 3.2 09/21/2018    K 4.1 09/19/2018    CL 95 09/21/2018    CO2 27 09/21/2018    BUN 16 09/21/2018 CREATININE 0.68 09/21/2018    GLUCOSE 128 09/21/2018    CALCIUM 8.8 09/21/2018      Lab Results   Component Value Date    LABALBU 3.4 (L) 09/19/2018     Lab Results   Component Value Date    SEDRATE 35 (H) 09/18/2018     Lab Results   Component Value Date    CRP 24.1 (H) 09/18/2018     No results found for: LABA1C  No results found for: EAG    MICROBIOLOGY:   WOUND CULTURE [[id number]] (Abnormal) Collected: 09/18/18 1722   Order Status: Completed Specimen: Ulcer Updated: 09/20/18 1143    WOUND/ABSCESS --    Gram Stain Result Moderate WBC's   Few Gram positive cocci   Moderate Gram negative rods    (A)    Organism Gram negative dorothy (A)    WOUND/ABSCESS --    Heavy growth   ID and sensitivity to follow     Organism Gram negative dorothy (A)    WOUND/ABSCESS --    Heavy growth   ID and sensitivity to follow          IMAGING:   X-ray of left tibia and fibula 09/18/19 as read by Radiologist  Narrative   EXAMINATION: Left leg. 3 films.       CLINICAL HISTORY: Pain and swelling of the soft tissues of the left leg.       FINDINGS: AP and lateral views reveal similar findings on the left side compared to the right. There is diffuse soft tissue swelling and generalized osteopenia. No bony focal lesions or destructive process. No evidence of periostitis.       Soft tissues are swollen and edematous but no discrete mass. There is no gas in the soft tissues.       Osteoarthritis involving the right knee is significant laterally and at the patellofemoral compartment. Mild degenerative changes at the ankle.           Impression   SOFT TISSUE SWELLING. OSTEOPENIA. BONES INTACT.       OSTEOARTHRITIS. Patient's case will be discussed with staff, who will provide final recommendations. Thank you for the consult. Ciro Dowd, PGY3  Please first page Podiat[facility] On Call, [phone]  September 21, 2018  6:01 PM    The resident/student was under my direct supervision during today's patient encounter.  reflection of my personal

## 2018-09-21 NOTE — CARE COORDINATION
[id number]  Electronically signed by ZAN Land on 9/21/18 at 12:28 PM

## 2018-09-21 NOTE — DISCHARGE SUMMARY
(WELLBUTRIN XL) 300 MG extended release tablet  Take 300 mg by mouth             ciprofloxacin (CIPRO) 500 MG tablet  Take 1 tablet by mouth 2 times daily for 21 days             diphenhydrAMINE (BENADRYL) 25 MG capsule  Take 25 mg by mouth every 6 hours as needed             escitalopram (LEXAPRO) 20 MG tablet  Take 20 mg by mouth             furosemide (LASIX) 40 MG tablet  TAKE ONE TABLET BY MOUTH EVERY DAY AS NEEDED for severe leg swelling             meloxicam (MOBIC) 15 MG tablet  Take 15 mg by mouth daily             omeprazole (PRILOSEC) 20 MG delayed release capsule  Take 20 mg by mouth daily             oxybutynin (DITROPAN) 5 MG tablet  Take 5 mg by mouth 2 times daily             oxyCODONE-acetaminophen (PERCOCET) 5-325 MG per tablet  Take 1 tablet by mouth every 4 hours as needed for Pain for up to 7 days. .             silver sulfADIAZINE (SILVADENE) 1 % cream  Apply topically daily. SODIUM CHLORIDE, EXTERNAL, 0.9 % SOLN  Apply 1 Applicatorful topically daily             topiramate (TOPAMAX) 25 MG tablet  Take 25 mg by mouth 2 times daily 1 tablet in morning and 2 in evening                 Disposition:   If discharged to Home, Any [place] [id number] needs that were indicated and/or required as been addressed and set up by Social Work. Condition at discharge: Pt was medically stable at the time of discharge. Activity: activity as tolerated    Total time taken for discharging this patient: 40 minutes. Greater than 70% of time was spent focused exclusively on this patient. Time was taken to review chart, discuss plans with consultants, reconciling medications, discussing plan answering questions with patient.      Hank Chanel  9/21/2018, 3:15 PM  ----------------------------------------------------------------------------------------------------------------------    Roland Rodney

## 2018-09-21 NOTE — PROGRESS NOTES
A + O x 4, able to state needs. Bilateral LE weeping, areas MARIANNE per patients request due to being to painful, elevation encouraged which patient will comply for a short period of time. Safety maintained, no distress noted. Call light within reach.

## 2018-09-23 LAB
BLOOD CULTURE, ROUTINE: NORMAL
CULTURE, BLOOD 2: NORMAL

## 2018-09-24 NOTE — PROGRESS NOTES
Physical Therapy  Facility/Department: [facility] MED SURG G028/S982-01  Physical Therapy Discharge      NAME: Alejandro Starr    : 1970 (53 y.o.)  MRN: [id number]    Account: [de-identified]  Gender: female      Patient has been discharged from acute care hospital. DC patient from current PT program.      Electronically signed by Caleb Irwin PT on 18 at 12:00 PM

## 2018-10-02 ENCOUNTER — HOSPITAL ENCOUNTER (OUTPATIENT)
Dept: WOUND CARE | Age: 48
Discharge: HOME | End: 2018-10-02
Payer: COMMERCIAL

## 2018-10-02 VITALS
TEMPERATURE: 97.5 F | SYSTOLIC BLOOD PRESSURE: 96 MMHG | DIASTOLIC BLOOD PRESSURE: 54 MMHG | HEART RATE: 73 BPM | RESPIRATION RATE: 16 BRPM

## 2018-10-02 PROCEDURE — 29580 STRAPPING UNNA BOOT: CPT

## 2018-10-02 RX ORDER — OXYCODONE HYDROCHLORIDE 5 MG/1
5 TABLET ORAL EVERY 6 HOURS PRN
Status: ON HOLD | COMMUNITY
End: 2019-01-01

## 2018-10-02 RX ORDER — DOCUSATE SODIUM 100 MG/1
100 CAPSULE, LIQUID FILLED ORAL 2 TIMES DAILY
COMMUNITY
End: 2018-12-18

## 2018-10-02 RX ORDER — POTASSIUM CHLORIDE 20 MEQ/1
20 TABLET, EXTENDED RELEASE ORAL DAILY
Status: ON HOLD | COMMUNITY
End: 2019-12-07

## 2018-10-02 RX ORDER — FLUCONAZOLE 150 MG/1
150 TABLET ORAL ONCE
Status: ON HOLD | COMMUNITY
End: 2019-01-01 | Stop reason: HOSPADM

## 2018-10-02 RX ORDER — ASCORBIC ACID 500 MG
500 TABLET ORAL DAILY
COMMUNITY

## 2018-10-02 RX ORDER — GABAPENTIN 300 MG/1
300 CAPSULE ORAL 3 TIMES DAILY
Status: ON HOLD | COMMUNITY
End: 2019-01-01

## 2018-10-02 ASSESSMENT — PAIN DESCRIPTION - PAIN TYPE: TYPE: CHRONIC PAIN

## 2018-10-02 ASSESSMENT — PAIN DESCRIPTION - LOCATION: LOCATION: LEG

## 2018-10-02 ASSESSMENT — PAIN DESCRIPTION - ORIENTATION: ORIENTATION: LEFT

## 2018-10-02 ASSESSMENT — PAIN SCALES - GENERAL: PAINLEVEL_OUTOF10: 8

## 2018-10-02 ASSESSMENT — PAIN DESCRIPTION - DESCRIPTORS: DESCRIPTORS: SHARP

## 2018-10-02 NOTE — PROGRESS NOTES
(TOPAMAX) 25 MG tablet Take 25 mg by mouth 2 times daily 1 tablet in morning and 2 in evening      SODIUM CHLORIDE, EXTERNAL, 0.9 % SOLN Apply 1 Applicatorful topically daily 1000 mL 5    omeprazole (PRILOSEC) 20 MG delayed release capsule Take 20 mg by mouth daily      acetaminophen (TYLENOL) 500 MG tablet Take 1,000 mg by mouth      buPROPion (WELLBUTRIN XL) 300 MG extended release tablet Take 300 mg by mouth      escitalopram (LEXAPRO) 20 MG tablet Take 20 mg by mouth      furosemide (LASIX) 40 MG tablet TAKE ONE TABLET BY MOUTH EVERY DAY AS NEEDED for severe leg swelling      oxybutynin (DITROPAN) 5 MG tablet Take 5 mg by mouth 2 times daily      silver sulfADIAZINE (SILVADENE) 1 % cream Apply topically daily. 400 g 3    meloxicam (MOBIC) 15 MG tablet Take 15 mg by mouth daily       No current facility-administered medications on file prior to encounter. REVIEW OF SYSTEMS    Pertinent items are noted in HPI. Objective:      BP (!) 96/54   Pulse 73   Temp 97.5 °F (36.4 °C) (Temporal)   Resp 16   LMP  (LMP Unknown)     Wt Readings from Last 3 Encounters:   09/18/18 (!) 500 lb (226.8 kg)   08/07/18 (!) 511 lb (231.8 kg)   07/17/18 (!) 487 lb (220.9 kg)       PHYSICAL EXAM    Constitutional:   Well nourished and well developed. Appears neat and clean. Patient is alert, oriented x3, and in no apparent distress. Respiratory:  Respiratory effort is easy and symmetric bilaterally. Rate is normal at rest and on room air. Vascular:  Pedal Pulses is palpable and audible signal noted with doppler. Capillary refill is <5 sec to digits bilateral.  Extremities positive  For +4 pitting edema. Legs are very hard and swollen. Neurological:  Gross and Light touch intact. Protective sensation  intact    Dermatological:  Wound description noted in wound assessment. The right leg is healed with a slight contact rash. She does have a rash on her arms and on her stomach as well.  May be from the

## 2018-10-16 ENCOUNTER — HOSPITAL ENCOUNTER (OUTPATIENT)
Dept: WOUND CARE | Age: 48
Discharge: HOME | End: 2018-10-16
Payer: COMMERCIAL

## 2018-10-16 VITALS
WEIGHT: 293 LBS | TEMPERATURE: 97.6 F | SYSTOLIC BLOOD PRESSURE: 131 MMHG | RESPIRATION RATE: 18 BRPM | HEIGHT: 65 IN | DIASTOLIC BLOOD PRESSURE: 85 MMHG | BODY MASS INDEX: 48.82 KG/M2 | HEART RATE: 72 BPM

## 2018-10-16 PROCEDURE — 29580 STRAPPING UNNA BOOT: CPT

## 2018-10-16 ASSESSMENT — PAIN SCALES - GENERAL: PAINLEVEL_OUTOF10: 0

## 2018-10-23 ENCOUNTER — HOSPITAL ENCOUNTER (OUTPATIENT)
Dept: WOUND CARE | Age: 48
Discharge: HOME | End: 2018-10-23
Payer: COMMERCIAL

## 2018-10-23 VITALS
RESPIRATION RATE: 18 BRPM | BODY MASS INDEX: 48.82 KG/M2 | WEIGHT: 293 LBS | HEIGHT: 65 IN | DIASTOLIC BLOOD PRESSURE: 65 MMHG | SYSTOLIC BLOOD PRESSURE: 126 MMHG | TEMPERATURE: 98.1 F | HEART RATE: 97 BPM

## 2018-10-23 PROCEDURE — 29581 APPL MULTLAYER CMPRN SYS LEG: CPT

## 2018-10-23 ASSESSMENT — PAIN SCALES - GENERAL: PAINLEVEL_OUTOF10: 0

## 2018-10-23 NOTE — PROGRESS NOTES
immediate release tablet Take 5 mg by mouth every 6 hours as needed for Pain. Sachi Braxton potassium chloride (KLOR-CON M) 20 MEQ extended release tablet Take 20 mEq by mouth daily      vitamin C (ASCORBIC ACID) 500 MG tablet Take 500 mg by mouth daily      diphenhydrAMINE (BENADRYL) 25 MG capsule Take 25 mg by mouth every 6 hours as needed      topiramate (TOPAMAX) 25 MG tablet Take 25 mg by mouth 2 times daily 1 tablet in morning and 2 in evening      SODIUM CHLORIDE, EXTERNAL, 0.9 % SOLN Apply 1 Applicatorful topically daily 1000 mL 5    silver sulfADIAZINE (SILVADENE) 1 % cream Apply topically daily. 400 g 3    meloxicam (MOBIC) 15 MG tablet Take 15 mg by mouth daily      omeprazole (PRILOSEC) 20 MG delayed release capsule Take 20 mg by mouth daily      acetaminophen (TYLENOL) 500 MG tablet Take 1,000 mg by mouth      buPROPion (WELLBUTRIN XL) 300 MG extended release tablet Take 300 mg by mouth      escitalopram (LEXAPRO) 20 MG tablet Take 20 mg by mouth      furosemide (LASIX) 40 MG tablet TAKE ONE TABLET BY MOUTH EVERY DAY AS NEEDED for severe leg swelling      oxybutynin (DITROPAN) 5 MG tablet Take 5 mg by mouth 2 times daily       No current facility-administered medications on file prior to encounter. REVIEW OF SYSTEMS    Pertinent items are noted in HPI. Objective:      /65   Pulse 97   Temp 98.1 °F (36.7 °C) (Temporal)   Resp 18   Ht 5' 5\" (1.651 m)   Wt (!) 500 lb (226.8 kg)   BMI 83.20 kg/m²     Wt Readings from Last 3 Encounters:   10/23/18 (!) 500 lb (226.8 kg)   10/16/18 (!) 500 lb (226.8 kg)   09/18/18 (!) 500 lb (226.8 kg)       PHYSICAL EXAM    Constitutional:   Well nourished and well developed. Appears neat and clean. Patient is alert, oriented x3, and in no apparent distress. Respiratory:  Respiratory effort is easy and symmetric bilaterally. Rate is normal at rest and on room air. Vascular:  Pedal Pulses is palpable and audible signal noted with doppler.

## 2018-11-20 ENCOUNTER — HOSPITAL ENCOUNTER (OUTPATIENT)
Dept: WOUND CARE | Age: 48
Discharge: HOME | End: 2018-11-20
Payer: COMMERCIAL

## 2018-11-20 VITALS
SYSTOLIC BLOOD PRESSURE: 123 MMHG | TEMPERATURE: 97.3 F | HEART RATE: 95 BPM | RESPIRATION RATE: 18 BRPM | DIASTOLIC BLOOD PRESSURE: 68 MMHG

## 2018-11-20 PROCEDURE — 29581 APPL MULTLAYER CMPRN SYS LEG: CPT

## 2018-11-20 PROCEDURE — 99213 OFFICE O/P EST LOW 20 MIN: CPT

## 2018-11-20 ASSESSMENT — PAIN SCALES - GENERAL: PAINLEVEL_OUTOF10: 0

## 2018-11-20 NOTE — CODE DOCUMENTATION
[address] Physician Billing Sheet. Jack Reyes  AGE: 53 y.o.    GENDER: female  : 1970  TODAY'S DATE:  2018    ICD-10 [address] [facility] [address] Problems    Diagnosis Date Noted    Lymph edema [I89.0] 2018    Venous insufficiency of both lower extremities [I87.2] 2018    Venous stasis ulcer of right calf with fat layer exposed with varicose veins ([facility] [address] [I83.012, L97.212] 2018    Morbid obesity due to excess calories ([address] [E66.01] 2018    Venous stasis ulcer of left calf ([address] [X79.629, L97.229] 2018       PHYSICIAN PROCEDURES    CPT CODE  42953      Electronically signed by Ratna Hassan DPM on 2018 at 2:16 PM

## 2018-11-20 NOTE — PROGRESS NOTES
[facility]   Progress Note and Procedure Note      Ady Gusman  MEDICAL RECORD NUMBER:  [id number]  AGE: 53 y.o. GENDER: female  : 1970  EPISODE DATE:  2018    Subjective:     Chief Complaint   Patient presents with    Wound Check     left leg wound         HISTORY of PRESENT ILLNESS HPI     Ady Gusman is a 53 y.o. female who presents today for wound/ulcer evaluation. History of Wound Context: She is unsure if the wounds have improved or not. The Unna boot did slip down over the weekend, so she tried to put a TubiGrip overtop to keep it in place better. Wound/Ulcer Pain Timing/Severity: constant  Quality of pain: sharp, burning  Severity:  5 / 10   Modifying Factors: Pain worsens with walking  Associated Signs/Symptoms: edema, erythema, drainage and obesity    Ulcer Identification:  Ulcer Type: venous and lymphedema  Contributing Factors: edema, venous stasis, lymphedema and obesity    Wound: N/A        PAST MEDICAL HISTORY        Diagnosis Date    Morbid obesity ([address]     Overactive bladder     Psychiatric problem        PAST SURGICAL HISTORY    Past Surgical History:   Procedure Laterality Date    ABDOMEN SURGERY      gastric bypass surgery    BACK SURGERY      CHOLECYSTECTOMY         FAMILY HISTORY    History reviewed. No pertinent family history. SOCIAL HISTORY    Social History   Substance Use Topics    Smoking status: Never Smoker    Smokeless tobacco: Never Used    Alcohol use Yes      Comment: occasional       ALLERGIES    Allergies   Allergen Reactions    Keflex [Cephalexin] Itching and Rash       MEDICATIONS    Current Outpatient Prescriptions on File Prior to Encounter   Medication Sig Dispense Refill    docusate sodium (COLACE) 100 MG capsule Take 100 mg by mouth 2 times daily      fluconazole (DIFLUCAN) 150 MG tablet Take 150 mg by mouth once      gabapentin (NEURONTIN) 300 MG capsule Take 300 mg by mouth 3 times daily. Amos Yoon oxyCODONE (ROXICODONE) 5 MG for pitting edema. Neurological:  Gross and Light touch intact. Protective sensation intact. Dermatological:  Wound description noted in wound assessment. The left posterior lower leg wound is resolved. The left anterolateral lower leg wound remains, but appears to be improving. She now has two new wounds on the right leg which are partial thickness. Psychiatric:  Judgement and insight intact. She was asked about the bariatric consult and she admits to being very lazy at home not using her pumps or bandages and being in a \"funk\"    Assessment:      Patient Active Problem List   Diagnosis Code    Morbid obesity due to excess calories ([facility] [address] E66.01    Venous stasis ulcer of right calf with fat layer exposed with varicose veins ([facility] [address] I83.012, L97.212    Venous stasis ulcer of left calf ([facility] [address] I83.022, L97.229    Venous insufficiency of both lower extremities I87.2    Lymph edema I89.0    Cellulitis L03.90        Procedure Note  Indications:  Based on my examination of this patient's wound(s)/ulcer(s) today, debridement is not required to promote healing and evaluate the wound base. Wound 06/26/18 Venous ulcer Pretibial Left #2 (Active)   Wound Image   11/20/2018  1:34 PM   Wound Type Wound 11/20/2018  1:34 PM   Wound Venous 11/20/2018  1:34 PM   Wound Cleansed Rinsed/Irrigated with saline 10/23/2018 11:57 AM   Wound Length (cm) 0 cm 11/20/2018  1:34 PM   Wound Width (cm) 0 cm 11/20/2018  1:34 PM   Wound Depth (cm)  0 11/20/2018  1:34 PM   Calculated Wound Size (cm^2) (l*w) 0 cm^2 11/20/2018  1:34 PM   Change in Wound Size % (l*w) 100 11/20/2018  1:34 PM   Drainage Amount None 11/20/2018  1:34 PM   Drainage Description Serosanguinous 10/23/2018 11:57 AM   Odor None 11/20/2018  1:34 PM   Margins Undefined edges 10/23/2018 11:57 AM   Radha-wound Assessment Dry; Intact 10/23/2018 11:57 AM   Non-staged Wound Description Full thickness 10/23/2018 11:57 AM   [place]%Wound Bed 100 11/20/2018  1:34 PM   Red%Wound Bed lateral leg (Active)   Wound Image   11/20/2018  1:33 PM   Wound Type Wound 11/20/2018  1:33 PM   Wound Venous 11/20/2018  1:33 PM   Wound Cleansed Rinsed/Irrigated with saline 11/20/2018  1:34 PM   Wound Length (cm) 5.5 cm 11/20/2018  1:34 PM   Wound Width (cm) 3 cm 11/20/2018  1:34 PM   Wound Depth (cm)  0.1 11/20/2018  1:34 PM   Calculated Wound Size (cm^2) (l*w) 16.5 cm^2 11/20/2018  1:34 PM   Drainage Amount Scant 11/20/2018  1:34 PM   Drainage Description Serosanguinous 11/20/2018  1:34 PM   Odor None 11/20/2018  1:34 PM   Margins Defined edges 11/20/2018  1:34 PM   Radha-wound Assessment Dry;Fragile 11/20/2018  1:34 PM   Red%Wound Bed 50 11/20/2018  1:34 PM   Yellow%Wound Bed 50 11/20/2018  1:34 PM   Op First Treatment Date 11/20/18 11/20/2018  1:34 PM   Number of days: 0         Plan:   Cobans to bilateral legs. Patient will see PCP about adjusting her anti depressent  Patient advised to see a bariatric surgeon to assist with weight loss. Continue elevation of the LLE for reduction of edema. Treatment Note please see attached Discharge Instructions    Written patient dismissal instructions given to patient and signed by patient or POA. Discharge Instructions       Visit Discharge/Physician Orders:     Home Care/Facilty: [address] [place] [address]     Wound Location:  Left lower leg wounds     Dressing orders:  1. Cleanse wounds with normal saline. 2. Apply Calcium Alginate, ABD and dry dressing. 3. Apply Coban 2 to BILATERAL LOWER LEGS. 4. Change dressing one time per week.     Compression: see above     Other Instructions:  MAKE APPOINTMENT WITH FAMILY DOCTOR. Use lymphedema pumps on right leg for one hour every day.  Cover wounds with chux when using pumps.     Keep all dressings clean & dry.  Do not shower, take baths or get wound wet, unless otherwise instructed by your Wound Care doctor.     Follow up visit  1 Weeks         For Diabetic patients keep blood sugars below 150 for optimal wound healing.     If you experience any of the following, please call the Wound Care Service during business hours: [phone]     *Increase in pain         *Temperature over 101           *Increase in drainage from your wound or a foul odor  *Uncontrolled swelling            *Need for compression bandage changes due to slippage, breakthrough drainage     If you need medical attention outside of business hours, please contact your Primary Care Doctor or go to the nearest emergency room. Keep next scheduled appointment. [employer] give 24 hour notice if unable to keep appointment. [phone]     PLEASE NOTE: IF YOU ARE UNABLE TO OBTAIN WOUND SUPPLIES, CONTINUE TO USE THE SUPPLIES YOU HAVE AVAILABLE UNTIL YOU ARE ABLE TO REACH US.  IT IS MOST IMPORTANT TO KEEP THE WOUND COVERED AT ALL TIMES  Electronically signed by Rk Osorio DPM on 11/20/2018 at 2:09 PM          Electronically signed by Rk Osorio DPM on 11/20/2018 at 2:11 PM

## 2018-11-27 ENCOUNTER — HOSPITAL ENCOUNTER (OUTPATIENT)
Dept: WOUND CARE | Age: 48
Discharge: HOME | End: 2018-11-27
Payer: COMMERCIAL

## 2018-11-27 VITALS
HEART RATE: 78 BPM | TEMPERATURE: 97.3 F | DIASTOLIC BLOOD PRESSURE: 79 MMHG | RESPIRATION RATE: 18 BRPM | SYSTOLIC BLOOD PRESSURE: 129 MMHG

## 2018-11-27 PROCEDURE — 29580 STRAPPING UNNA BOOT: CPT

## 2018-11-27 ASSESSMENT — PAIN DESCRIPTION - PAIN TYPE: TYPE: CHRONIC PAIN

## 2018-11-27 ASSESSMENT — PAIN SCALES - GENERAL: PAINLEVEL_OUTOF10: 2

## 2018-11-27 ASSESSMENT — PAIN DESCRIPTION - LOCATION: LOCATION: LEG

## 2018-11-27 ASSESSMENT — PAIN DESCRIPTION - FREQUENCY: FREQUENCY: INTERMITTENT

## 2018-11-27 ASSESSMENT — PAIN DESCRIPTION - ORIENTATION: ORIENTATION: RIGHT;LEFT

## 2018-12-18 ENCOUNTER — HOSPITAL ENCOUNTER (OUTPATIENT)
Dept: WOUND CARE | Age: 48
Discharge: HOME | End: 2018-12-18
Payer: COMMERCIAL

## 2018-12-18 VITALS
WEIGHT: 293 LBS | TEMPERATURE: 97.8 F | BODY MASS INDEX: 48.82 KG/M2 | SYSTOLIC BLOOD PRESSURE: 148 MMHG | RESPIRATION RATE: 18 BRPM | DIASTOLIC BLOOD PRESSURE: 85 MMHG | HEIGHT: 65 IN | HEART RATE: 96 BPM

## 2018-12-18 DIAGNOSIS — L97.212 VENOUS STASIS ULCER OF RIGHT CALF WITH FAT LAYER EXPOSED WITH VARICOSE VEINS (HCC): Primary | Chronic | ICD-10-CM

## 2018-12-18 DIAGNOSIS — F33.1 MODERATE EPISODE OF RECURRENT MAJOR DEPRESSIVE DISORDER (HCC): ICD-10-CM

## 2018-12-18 DIAGNOSIS — L03.116 CELLULITIS OF LEFT LOWER EXTREMITY: ICD-10-CM

## 2018-12-18 DIAGNOSIS — I83.012 VENOUS STASIS ULCER OF RIGHT CALF WITH FAT LAYER EXPOSED WITH VARICOSE VEINS (HCC): Primary | Chronic | ICD-10-CM

## 2018-12-18 PROCEDURE — 87205 SMEAR GRAM STAIN: CPT

## 2018-12-18 PROCEDURE — 87070 CULTURE OTHR SPECIMN AEROBIC: CPT

## 2018-12-18 PROCEDURE — 99213 OFFICE O/P EST LOW 20 MIN: CPT

## 2018-12-18 PROCEDURE — 87186 SC STD MICRODIL/AGAR DIL: CPT

## 2018-12-18 PROCEDURE — 87077 CULTURE AEROBIC IDENTIFY: CPT

## 2018-12-18 ASSESSMENT — PAIN SCALES - GENERAL: PAINLEVEL_OUTOF10: 0

## 2018-12-21 LAB
GRAM STAIN RESULT: ABNORMAL
ORGANISM: ABNORMAL
WOUND/ABSCESS: ABNORMAL
WOUND/ABSCESS: ABNORMAL

## 2018-12-26 ENCOUNTER — TELEPHONE (OUTPATIENT)
Dept: ADMISSION | Age: 48
End: 2018-12-26

## 2018-12-27 ENCOUNTER — TELEPHONE (OUTPATIENT)
Dept: INFECTIOUS DISEASES | Age: 48
End: 2018-12-27

## 2018-12-27 NOTE — TELEPHONE ENCOUNTER
Patient called on status of Referral. Advised it is still in Review. Patient being seen by Dr Jean-Paul Mitchell. Recent Cx obtained 12/18/18. Confirmed Phone # to contact.

## 2018-12-27 NOTE — TELEPHONE ENCOUNTER
Found Dr Rafaela Goode has Reviewed and would expect to see patient soon. Call to patient. LMOVM. Advised a 10 am Opening tomorrow, 12/28/18 is Available. Requested a Return call.

## 2018-12-28 ENCOUNTER — OFFICE VISIT (OUTPATIENT)
Dept: INFECTIOUS DISEASES | Age: 48
End: 2018-12-28
Payer: COMMERCIAL

## 2018-12-28 ENCOUNTER — HOSPITAL ENCOUNTER (INPATIENT)
Age: 48
LOS: 4 days | Discharge: NURSING, REHAB OR LONG-TERM CARE | DRG: 383 | End: 2019-01-01
Attending: INTERNAL MEDICINE | Admitting: INTERNAL MEDICINE
Payer: COMMERCIAL

## 2018-12-28 VITALS
RESPIRATION RATE: 22 BRPM | SYSTOLIC BLOOD PRESSURE: 117 MMHG | HEART RATE: 103 BPM | DIASTOLIC BLOOD PRESSURE: 68 MMHG | BODY MASS INDEX: 83.2 KG/M2 | TEMPERATURE: 97.8 F | HEIGHT: 65 IN

## 2018-12-28 DIAGNOSIS — L97.919 VENOUS STASIS ULCERS OF BOTH LOWER EXTREMITIES (HCC): ICD-10-CM

## 2018-12-28 DIAGNOSIS — I83.029 VENOUS STASIS ULCERS OF BOTH LOWER EXTREMITIES (HCC): ICD-10-CM

## 2018-12-28 DIAGNOSIS — L97.929 VENOUS STASIS ULCERS OF BOTH LOWER EXTREMITIES (HCC): ICD-10-CM

## 2018-12-28 DIAGNOSIS — I89.0 CHRONIC ACQUIRED LYMPHEDEMA: ICD-10-CM

## 2018-12-28 DIAGNOSIS — E66.01 MORBID OBESITY WITH BMI OF 50.0-59.9, ADULT (HCC): ICD-10-CM

## 2018-12-28 DIAGNOSIS — L03.119 CELLULITIS OF LOWER EXTREMITY, UNSPECIFIED LATERALITY: Primary | ICD-10-CM

## 2018-12-28 DIAGNOSIS — I83.012 VENOUS STASIS ULCER OF RIGHT CALF WITH FAT LAYER EXPOSED WITH VARICOSE VEINS (HCC): Primary | Chronic | ICD-10-CM

## 2018-12-28 DIAGNOSIS — I83.019 VENOUS STASIS ULCERS OF BOTH LOWER EXTREMITIES (HCC): ICD-10-CM

## 2018-12-28 DIAGNOSIS — L03.119 CELLULITIS OF LOWER EXTREMITY, UNSPECIFIED LATERALITY: ICD-10-CM

## 2018-12-28 DIAGNOSIS — L97.212 VENOUS STASIS ULCER OF RIGHT CALF WITH FAT LAYER EXPOSED WITH VARICOSE VEINS (HCC): Primary | Chronic | ICD-10-CM

## 2018-12-28 LAB
ALBUMIN SERPL-MCNC: 3.9 G/DL (ref 3.9–4.9)
ALP BLD-CCNC: 72 U/L (ref 40–130)
ALT SERPL-CCNC: 16 U/L (ref 0–33)
ANION GAP SERPL CALCULATED.3IONS-SCNC: 11 MEQ/L (ref 7–13)
AST SERPL-CCNC: 16 U/L (ref 0–35)
BASOPHILS ABSOLUTE: 0 K/UL (ref 0–0.2)
BASOPHILS RELATIVE PERCENT: 0.5 %
BILIRUB SERPL-MCNC: 0.3 MG/DL (ref 0–1.2)
BUN BLDV-MCNC: 21 MG/DL (ref 6–20)
C-REACTIVE PROTEIN, HIGH SENSITIVITY: 25.9 MG/L (ref 0–5)
CALCIUM SERPL-MCNC: 9 MG/DL (ref 8.6–10.2)
CHLORIDE BLD-SCNC: 102 MEQ/L (ref 98–107)
CO2: 28 MEQ/L (ref 22–29)
CREAT SERPL-MCNC: 0.73 MG/DL (ref 0.5–0.9)
EOSINOPHILS ABSOLUTE: 0.1 K/UL (ref 0–0.7)
EOSINOPHILS RELATIVE PERCENT: 1.1 %
GFR AFRICAN AMERICAN: >60
GFR NON-AFRICAN AMERICAN: >60
GLOBULIN: 3.4 G/DL (ref 2.3–3.5)
GLUCOSE BLD-MCNC: 100 MG/DL (ref 74–109)
HBA1C MFR BLD: 5.6 % (ref 4.8–5.9)
HCT VFR BLD CALC: 41.7 % (ref 37–47)
HEMOGLOBIN: 13.6 G/DL (ref 12–16)
LYMPHOCYTES ABSOLUTE: 1.5 K/UL (ref 1–4.8)
LYMPHOCYTES RELATIVE PERCENT: 20.6 %
MAGNESIUM: 2.1 MG/DL (ref 1.7–2.3)
MCH RBC QN AUTO: 25.7 PG (ref 27–31.3)
MCHC RBC AUTO-ENTMCNC: 32.7 % (ref 33–37)
MCV RBC AUTO: 78.6 FL (ref 82–100)
MONOCYTES ABSOLUTE: 0.5 K/UL (ref 0.2–0.8)
MONOCYTES RELATIVE PERCENT: 7.6 %
NEUTROPHILS ABSOLUTE: 4.9 K/UL (ref 1.4–6.5)
NEUTROPHILS RELATIVE PERCENT: 70.2 %
PDW BLD-RTO: 15.7 % (ref 11.5–14.5)
PLATELET # BLD: 189 K/UL (ref 130–400)
POTASSIUM SERPL-SCNC: 4 MEQ/L (ref 3.5–5.1)
RBC # BLD: 5.3 M/UL (ref 4.2–5.4)
SODIUM BLD-SCNC: 141 MEQ/L (ref 132–144)
TOTAL PROTEIN: 7.3 G/DL (ref 6.4–8.1)
WBC # BLD: 7 K/UL (ref 4.8–10.8)

## 2018-12-28 PROCEDURE — 84443 ASSAY THYROID STIM HORMONE: CPT

## 2018-12-28 PROCEDURE — 1036F TOBACCO NON-USER: CPT | Performed by: INTERNAL MEDICINE

## 2018-12-28 PROCEDURE — 36415 COLL VENOUS BLD VENIPUNCTURE: CPT

## 2018-12-28 PROCEDURE — 83036 HEMOGLOBIN GLYCOSYLATED A1C: CPT

## 2018-12-28 PROCEDURE — 82607 VITAMIN B-12: CPT

## 2018-12-28 PROCEDURE — 86141 C-REACTIVE PROTEIN HS: CPT

## 2018-12-28 PROCEDURE — 6360000002 HC RX W HCPCS: Performed by: INTERNAL MEDICINE

## 2018-12-28 PROCEDURE — G8417 CALC BMI ABV UP PARAM F/U: HCPCS | Performed by: INTERNAL MEDICINE

## 2018-12-28 PROCEDURE — 84439 ASSAY OF FREE THYROXINE: CPT

## 2018-12-28 PROCEDURE — 85025 COMPLETE CBC W/AUTO DIFF WBC: CPT

## 2018-12-28 PROCEDURE — 84145 PROCALCITONIN (PCT): CPT

## 2018-12-28 PROCEDURE — 80053 COMPREHEN METABOLIC PANEL: CPT

## 2018-12-28 PROCEDURE — 2580000003 HC RX 258: Performed by: INTERNAL MEDICINE

## 2018-12-28 PROCEDURE — 99214 OFFICE O/P EST MOD 30 MIN: CPT | Performed by: INTERNAL MEDICINE

## 2018-12-28 PROCEDURE — 83735 ASSAY OF MAGNESIUM: CPT

## 2018-12-28 PROCEDURE — G8484 FLU IMMUNIZE NO ADMIN: HCPCS | Performed by: INTERNAL MEDICINE

## 2018-12-28 PROCEDURE — 6370000000 HC RX 637 (ALT 250 FOR IP): Performed by: INTERNAL MEDICINE

## 2018-12-28 PROCEDURE — 82746 ASSAY OF FOLIC ACID SERUM: CPT

## 2018-12-28 PROCEDURE — G8427 DOCREV CUR MEDS BY ELIG CLIN: HCPCS | Performed by: INTERNAL MEDICINE

## 2018-12-28 PROCEDURE — 1210000000 HC MED SURG R&B

## 2018-12-28 RX ORDER — FUROSEMIDE 40 MG/1
40 TABLET ORAL DAILY
Status: DISCONTINUED | OUTPATIENT
Start: 2018-12-28 | End: 2019-01-01 | Stop reason: HOSPADM

## 2018-12-28 RX ORDER — ESCITALOPRAM OXALATE 20 MG/1
20 TABLET ORAL DAILY
Status: DISCONTINUED | OUTPATIENT
Start: 2018-12-28 | End: 2019-01-01 | Stop reason: HOSPADM

## 2018-12-28 RX ORDER — MELOXICAM 7.5 MG/1
15 TABLET ORAL DAILY
Status: DISCONTINUED | OUTPATIENT
Start: 2018-12-28 | End: 2019-01-01 | Stop reason: HOSPADM

## 2018-12-28 RX ORDER — OXYBUTYNIN CHLORIDE 5 MG/1
5 TABLET ORAL 2 TIMES DAILY
Status: DISCONTINUED | OUTPATIENT
Start: 2018-12-28 | End: 2019-01-01 | Stop reason: HOSPADM

## 2018-12-28 RX ORDER — PANTOPRAZOLE SODIUM 40 MG/1
40 TABLET, DELAYED RELEASE ORAL
Status: DISCONTINUED | OUTPATIENT
Start: 2018-12-29 | End: 2019-01-01 | Stop reason: HOSPADM

## 2018-12-28 RX ORDER — ONDANSETRON 2 MG/ML
4 INJECTION INTRAMUSCULAR; INTRAVENOUS EVERY 6 HOURS PRN
Status: DISCONTINUED | OUTPATIENT
Start: 2018-12-28 | End: 2019-01-01 | Stop reason: HOSPADM

## 2018-12-28 RX ORDER — GABAPENTIN 300 MG/1
300 CAPSULE ORAL 3 TIMES DAILY
Status: DISCONTINUED | OUTPATIENT
Start: 2018-12-28 | End: 2019-01-01 | Stop reason: HOSPADM

## 2018-12-28 RX ORDER — BUPROPION HYDROCHLORIDE 150 MG/1
300 TABLET ORAL EVERY MORNING
Status: DISCONTINUED | OUTPATIENT
Start: 2018-12-29 | End: 2019-01-01 | Stop reason: HOSPADM

## 2018-12-28 RX ORDER — TOPIRAMATE 25 MG/1
25 TABLET ORAL DAILY
Status: DISCONTINUED | OUTPATIENT
Start: 2018-12-28 | End: 2019-01-01 | Stop reason: HOSPADM

## 2018-12-28 RX ORDER — ACETAMINOPHEN 325 MG/1
650 TABLET ORAL EVERY 4 HOURS PRN
Status: DISCONTINUED | OUTPATIENT
Start: 2018-12-28 | End: 2019-01-01 | Stop reason: HOSPADM

## 2018-12-28 RX ORDER — KETOROLAC TROMETHAMINE 30 MG/ML
30 INJECTION, SOLUTION INTRAMUSCULAR; INTRAVENOUS EVERY 6 HOURS PRN
Status: DISCONTINUED | OUTPATIENT
Start: 2018-12-28 | End: 2019-01-01 | Stop reason: HOSPADM

## 2018-12-28 RX ORDER — OXYCODONE HYDROCHLORIDE 5 MG/1
5 TABLET ORAL EVERY 6 HOURS PRN
Status: DISCONTINUED | OUTPATIENT
Start: 2018-12-28 | End: 2019-01-01 | Stop reason: HOSPADM

## 2018-12-28 RX ORDER — SODIUM CHLORIDE 0.9 % (FLUSH) 0.9 %
10 SYRINGE (ML) INJECTION PRN
Status: DISCONTINUED | OUTPATIENT
Start: 2018-12-28 | End: 2019-01-01 | Stop reason: HOSPADM

## 2018-12-28 RX ORDER — SODIUM CHLORIDE 0.9 % (FLUSH) 0.9 %
10 SYRINGE (ML) INJECTION EVERY 12 HOURS SCHEDULED
Status: DISCONTINUED | OUTPATIENT
Start: 2018-12-28 | End: 2019-01-01 | Stop reason: HOSPADM

## 2018-12-28 RX ADMIN — OXYCODONE HYDROCHLORIDE 5 MG: 5 TABLET ORAL at 18:28

## 2018-12-28 RX ADMIN — Medication 10 ML: at 20:22

## 2018-12-28 RX ADMIN — GABAPENTIN 300 MG: 300 CAPSULE ORAL at 21:00

## 2018-12-28 RX ADMIN — KETOROLAC TROMETHAMINE 30 MG: 30 INJECTION, SOLUTION INTRAMUSCULAR at 23:43

## 2018-12-28 RX ADMIN — OXYBUTYNIN CHLORIDE 5 MG: 5 TABLET ORAL at 21:00

## 2018-12-28 RX ADMIN — PIPERACILLIN SODIUM,TAZOBACTAM SODIUM 3.38 G: 3; .375 INJECTION, POWDER, FOR SOLUTION INTRAVENOUS at 20:20

## 2018-12-28 ASSESSMENT — PAIN DESCRIPTION - ORIENTATION
ORIENTATION: RIGHT;LEFT

## 2018-12-28 ASSESSMENT — PAIN DESCRIPTION - DESCRIPTORS
DESCRIPTORS: BURNING

## 2018-12-28 ASSESSMENT — PAIN SCALES - GENERAL
PAINLEVEL_OUTOF10: 10
PAINLEVEL_OUTOF10: 8

## 2018-12-28 ASSESSMENT — PAIN DESCRIPTION - LOCATION
LOCATION: LEG

## 2018-12-28 ASSESSMENT — ENCOUNTER SYMPTOMS: COLOR CHANGE: 1

## 2018-12-28 ASSESSMENT — PAIN DESCRIPTION - PAIN TYPE
TYPE: CHRONIC PAIN
TYPE: CHRONIC PAIN

## 2018-12-28 NOTE — PROGRESS NOTES
Subjective:      Patient ID: Gerard Hook is a 53 y.o. female. HPI  Referred by Dr Salbador Yi for + wound Cx for Enterobacter. Has recurrent B legs cellulitis for 1 year. Was hospitalized at [facility] in October. Developed extensive B legs ulcers with copious drainage for 2 weeks, severe burning pain, redness, no fevers. Has severe morbid obesity. has chronic Lymphedema. Going to [facility] bariatric program. Persistent B legs swelling despite Lasix. PMHx, allergies and social Hx were reviewed    Review of Systems   Cardiovascular: Positive for leg swelling. Skin: Positive for color change and wound. Neurological: Positive for weakness (uses a walker). All other systems reviewed and are negative. Objective:   Physical Exam   Constitutional: She is oriented to person, place, and time. No distress. HENT:   Mouth/Throat: No oropharyngeal exudate. Eyes: No scleral icterus. Neck: Normal range of motion. Neck supple. Cardiovascular: Normal rate, regular rhythm and normal heart sounds. No murmur heard. Pulmonary/Chest: Effort normal and breath sounds normal. No respiratory distress. She has no wheezes. She has no rales. Abdominal: Soft. Bowel sounds are normal. There is no tenderness (obese, large pannus). Musculoskeletal: She exhibits edema and tenderness. Lymphadenopathy:     She has no cervical adenopathy. Neurological: She is alert and oriented to person, place, and time. Skin: No rash noted. There is erythema. Psychiatric: She has a normal mood and affect. Her behavior is normal.   Nursing note and vitals reviewed.   B legs with extensive blisters, drainage, erythema and tenderness  25 mins spent  Assessment:    BLE cellulitis  BLE extensive blisters and wounds  Chronic Lymphedema BLE  Super morbid obesity        Plan:      Admit to [facility] for IV Zosyn  Local wound care and pain control        Karin Jeffries MD

## 2018-12-29 LAB
ALBUMIN SERPL-MCNC: 3.1 G/DL (ref 3.9–4.9)
ANION GAP SERPL CALCULATED.3IONS-SCNC: 10 MEQ/L (ref 7–13)
BUN BLDV-MCNC: 22 MG/DL (ref 6–20)
CALCIUM SERPL-MCNC: 8.7 MG/DL (ref 8.6–10.2)
CHLORIDE BLD-SCNC: 104 MEQ/L (ref 98–107)
CO2: 26 MEQ/L (ref 22–29)
CREAT SERPL-MCNC: 0.64 MG/DL (ref 0.5–0.9)
FOLATE: 8 NG/ML (ref 7.3–26.1)
GFR AFRICAN AMERICAN: >60
GFR NON-AFRICAN AMERICAN: >60
GLUCOSE BLD-MCNC: 114 MG/DL (ref 74–109)
HCT VFR BLD CALC: 40.2 % (ref 37–47)
HEMOGLOBIN: 13.2 G/DL (ref 12–16)
MAGNESIUM: 2.2 MG/DL (ref 1.7–2.3)
MCH RBC QN AUTO: 26.2 PG (ref 27–31.3)
MCHC RBC AUTO-ENTMCNC: 32.7 % (ref 33–37)
MCV RBC AUTO: 80 FL (ref 82–100)
PDW BLD-RTO: 15.3 % (ref 11.5–14.5)
PHOSPHORUS: 4.1 MG/DL (ref 2.5–4.5)
PLATELET # BLD: 160 K/UL (ref 130–400)
POTASSIUM SERPL-SCNC: 4.3 MEQ/L (ref 3.5–5.1)
PROCALCITONIN: 0.04 NG/ML (ref 0–0.15)
RBC # BLD: 5.03 M/UL (ref 4.2–5.4)
SODIUM BLD-SCNC: 140 MEQ/L (ref 132–144)
T4 FREE: 1.04 NG/DL (ref 0.93–1.7)
TSH SERPL DL<=0.05 MIU/L-ACNC: 4.15 UIU/ML (ref 0.27–4.2)
VITAMIN B-12: 328 PG/ML (ref 232–1245)
WBC # BLD: 6.8 K/UL (ref 4.8–10.8)

## 2018-12-29 PROCEDURE — 2580000003 HC RX 258: Performed by: INTERNAL MEDICINE

## 2018-12-29 PROCEDURE — 6360000002 HC RX W HCPCS: Performed by: INTERNAL MEDICINE

## 2018-12-29 PROCEDURE — 83735 ASSAY OF MAGNESIUM: CPT

## 2018-12-29 PROCEDURE — 6370000000 HC RX 637 (ALT 250 FOR IP): Performed by: INTERNAL MEDICINE

## 2018-12-29 PROCEDURE — 99253 IP/OBS CNSLTJ NEW/EST LOW 45: CPT | Performed by: INTERNAL MEDICINE

## 2018-12-29 PROCEDURE — 36415 COLL VENOUS BLD VENIPUNCTURE: CPT

## 2018-12-29 PROCEDURE — 02HV33Z INSERTION OF INFUSION DEVICE INTO SUPERIOR VENA CAVA, PERCUTANEOUS APPROACH: ICD-10-PCS | Performed by: INTERNAL MEDICINE

## 2018-12-29 PROCEDURE — 80069 RENAL FUNCTION PANEL: CPT

## 2018-12-29 PROCEDURE — 1210000000 HC MED SURG R&B

## 2018-12-29 PROCEDURE — 85027 COMPLETE CBC AUTOMATED: CPT

## 2018-12-29 RX ORDER — LIDOCAINE HYDROCHLORIDE 20 MG/ML
5 INJECTION, SOLUTION INFILTRATION; PERINEURAL ONCE
Status: COMPLETED | OUTPATIENT
Start: 2018-12-29 | End: 2018-12-31

## 2018-12-29 RX ORDER — SODIUM CHLORIDE 9 MG/ML
250 INJECTION, SOLUTION INTRAVENOUS ONCE
Status: COMPLETED | OUTPATIENT
Start: 2018-12-29 | End: 2018-12-31

## 2018-12-29 RX ORDER — SODIUM CHLORIDE 0.9 % (FLUSH) 0.9 %
10 SYRINGE (ML) INJECTION EVERY 12 HOURS SCHEDULED
Status: DISCONTINUED | OUTPATIENT
Start: 2018-12-29 | End: 2019-01-01 | Stop reason: HOSPADM

## 2018-12-29 RX ORDER — SODIUM CHLORIDE 0.9 % (FLUSH) 0.9 %
10 SYRINGE (ML) INJECTION PRN
Status: DISCONTINUED | OUTPATIENT
Start: 2018-12-29 | End: 2019-01-01 | Stop reason: HOSPADM

## 2018-12-29 RX ADMIN — OXYCODONE HYDROCHLORIDE 5 MG: 5 TABLET ORAL at 08:31

## 2018-12-29 RX ADMIN — FUROSEMIDE 40 MG: 40 TABLET ORAL at 08:31

## 2018-12-29 RX ADMIN — Medication 10 ML: at 20:36

## 2018-12-29 RX ADMIN — ENOXAPARIN SODIUM 40 MG: 40 INJECTION SUBCUTANEOUS at 08:33

## 2018-12-29 RX ADMIN — ACETAMINOPHEN 650 MG: 325 TABLET ORAL at 05:28

## 2018-12-29 RX ADMIN — PIPERACILLIN SODIUM,TAZOBACTAM SODIUM 3.38 G: 3; .375 INJECTION, POWDER, FOR SOLUTION INTRAVENOUS at 04:21

## 2018-12-29 RX ADMIN — GABAPENTIN 300 MG: 300 CAPSULE ORAL at 08:31

## 2018-12-29 RX ADMIN — KETOROLAC TROMETHAMINE 30 MG: 30 INJECTION, SOLUTION INTRAMUSCULAR at 12:15

## 2018-12-29 RX ADMIN — MELOXICAM 15 MG: 7.5 TABLET ORAL at 08:31

## 2018-12-29 RX ADMIN — PIPERACILLIN SODIUM,TAZOBACTAM SODIUM 3.38 G: 3; .375 INJECTION, POWDER, FOR SOLUTION INTRAVENOUS at 12:15

## 2018-12-29 RX ADMIN — BUPROPION HYDROCHLORIDE 300 MG: 150 TABLET, EXTENDED RELEASE ORAL at 08:31

## 2018-12-29 RX ADMIN — OXYBUTYNIN CHLORIDE 5 MG: 5 TABLET ORAL at 20:41

## 2018-12-29 RX ADMIN — KETOROLAC TROMETHAMINE 30 MG: 30 INJECTION, SOLUTION INTRAMUSCULAR at 05:28

## 2018-12-29 RX ADMIN — ESCITALOPRAM OXALATE 20 MG: 20 TABLET ORAL at 08:31

## 2018-12-29 RX ADMIN — Medication 10 ML: at 08:31

## 2018-12-29 RX ADMIN — OXYCODONE HYDROCHLORIDE 5 MG: 5 TABLET ORAL at 14:31

## 2018-12-29 RX ADMIN — OXYCODONE HYDROCHLORIDE 5 MG: 5 TABLET ORAL at 00:36

## 2018-12-29 RX ADMIN — ACETAMINOPHEN 650 MG: 325 TABLET ORAL at 00:36

## 2018-12-29 RX ADMIN — OXYBUTYNIN CHLORIDE 5 MG: 5 TABLET ORAL at 08:31

## 2018-12-29 RX ADMIN — TOPIRAMATE 25 MG: 25 TABLET, FILM COATED ORAL at 08:31

## 2018-12-29 RX ADMIN — GABAPENTIN 300 MG: 300 CAPSULE ORAL at 14:31

## 2018-12-29 RX ADMIN — GABAPENTIN 300 MG: 300 CAPSULE ORAL at 20:41

## 2018-12-29 RX ADMIN — PIPERACILLIN SODIUM,TAZOBACTAM SODIUM 3.38 G: 3; .375 INJECTION, POWDER, FOR SOLUTION INTRAVENOUS at 20:34

## 2018-12-29 RX ADMIN — KETOROLAC TROMETHAMINE 30 MG: 30 INJECTION, SOLUTION INTRAMUSCULAR at 18:19

## 2018-12-29 ASSESSMENT — PAIN SCALES - GENERAL
PAINLEVEL_OUTOF10: 7
PAINLEVEL_OUTOF10: 6
PAINLEVEL_OUTOF10: 7
PAINLEVEL_OUTOF10: 10
PAINLEVEL_OUTOF10: 6

## 2018-12-29 ASSESSMENT — PAIN DESCRIPTION - ORIENTATION: ORIENTATION: RIGHT;LEFT;LOWER

## 2018-12-29 ASSESSMENT — PAIN DESCRIPTION - LOCATION: LOCATION: LEG

## 2018-12-29 ASSESSMENT — PAIN DESCRIPTION - PAIN TYPE: TYPE: CHRONIC PAIN

## 2018-12-29 ASSESSMENT — PAIN DESCRIPTION - DESCRIPTORS: DESCRIPTORS: BURNING

## 2018-12-30 LAB
ALBUMIN SERPL-MCNC: 2.9 G/DL (ref 3.9–4.9)
ANION GAP SERPL CALCULATED.3IONS-SCNC: 9 MEQ/L (ref 7–13)
BUN BLDV-MCNC: 21 MG/DL (ref 6–20)
CALCIUM SERPL-MCNC: 8.2 MG/DL (ref 8.6–10.2)
CHLORIDE BLD-SCNC: 102 MEQ/L (ref 98–107)
CO2: 27 MEQ/L (ref 22–29)
CREAT SERPL-MCNC: 0.68 MG/DL (ref 0.5–0.9)
GFR AFRICAN AMERICAN: >60
GFR NON-AFRICAN AMERICAN: >60
GLUCOSE BLD-MCNC: 123 MG/DL (ref 74–109)
HCT VFR BLD CALC: 35.5 % (ref 37–47)
HEMOGLOBIN: 11.4 G/DL (ref 12–16)
MAGNESIUM: 2 MG/DL (ref 1.7–2.3)
MCH RBC QN AUTO: 25.7 PG (ref 27–31.3)
MCHC RBC AUTO-ENTMCNC: 32.2 % (ref 33–37)
MCV RBC AUTO: 79.9 FL (ref 82–100)
PDW BLD-RTO: 15.9 % (ref 11.5–14.5)
PHOSPHORUS: 3.5 MG/DL (ref 2.5–4.5)
PLATELET # BLD: 161 K/UL (ref 130–400)
POTASSIUM SERPL-SCNC: 3.8 MEQ/L (ref 3.5–5.1)
RBC # BLD: 4.45 M/UL (ref 4.2–5.4)
SODIUM BLD-SCNC: 138 MEQ/L (ref 132–144)
WBC # BLD: 5.1 K/UL (ref 4.8–10.8)

## 2018-12-30 PROCEDURE — 85027 COMPLETE CBC AUTOMATED: CPT

## 2018-12-30 PROCEDURE — 6360000002 HC RX W HCPCS: Performed by: INTERNAL MEDICINE

## 2018-12-30 PROCEDURE — 1210000000 HC MED SURG R&B

## 2018-12-30 PROCEDURE — 36415 COLL VENOUS BLD VENIPUNCTURE: CPT

## 2018-12-30 PROCEDURE — 80069 RENAL FUNCTION PANEL: CPT

## 2018-12-30 PROCEDURE — 2580000003 HC RX 258: Performed by: INTERNAL MEDICINE

## 2018-12-30 PROCEDURE — 6370000000 HC RX 637 (ALT 250 FOR IP): Performed by: INTERNAL MEDICINE

## 2018-12-30 PROCEDURE — 83735 ASSAY OF MAGNESIUM: CPT

## 2018-12-30 RX ADMIN — GABAPENTIN 300 MG: 300 CAPSULE ORAL at 20:57

## 2018-12-30 RX ADMIN — OXYBUTYNIN CHLORIDE 5 MG: 5 TABLET ORAL at 20:57

## 2018-12-30 RX ADMIN — KETOROLAC TROMETHAMINE 30 MG: 30 INJECTION, SOLUTION INTRAMUSCULAR at 04:06

## 2018-12-30 RX ADMIN — GABAPENTIN 300 MG: 300 CAPSULE ORAL at 15:30

## 2018-12-30 RX ADMIN — BUPROPION HYDROCHLORIDE 300 MG: 150 TABLET, EXTENDED RELEASE ORAL at 08:35

## 2018-12-30 RX ADMIN — TOPIRAMATE 25 MG: 25 TABLET, FILM COATED ORAL at 08:35

## 2018-12-30 RX ADMIN — MELOXICAM 15 MG: 7.5 TABLET ORAL at 08:35

## 2018-12-30 RX ADMIN — Medication 10 ML: at 20:57

## 2018-12-30 RX ADMIN — KETOROLAC TROMETHAMINE 30 MG: 30 INJECTION, SOLUTION INTRAMUSCULAR at 10:02

## 2018-12-30 RX ADMIN — FUROSEMIDE 40 MG: 40 TABLET ORAL at 08:35

## 2018-12-30 RX ADMIN — PIPERACILLIN SODIUM,TAZOBACTAM SODIUM 3.38 G: 3; .375 INJECTION, POWDER, FOR SOLUTION INTRAVENOUS at 15:19

## 2018-12-30 RX ADMIN — ESCITALOPRAM OXALATE 20 MG: 20 TABLET ORAL at 08:35

## 2018-12-30 RX ADMIN — Medication 10 ML: at 08:36

## 2018-12-30 RX ADMIN — KETOROLAC TROMETHAMINE 30 MG: 30 INJECTION, SOLUTION INTRAMUSCULAR at 20:57

## 2018-12-30 RX ADMIN — OXYCODONE HYDROCHLORIDE 5 MG: 5 TABLET ORAL at 23:42

## 2018-12-30 RX ADMIN — PIPERACILLIN SODIUM,TAZOBACTAM SODIUM 3.38 G: 3; .375 INJECTION, POWDER, FOR SOLUTION INTRAVENOUS at 23:42

## 2018-12-30 RX ADMIN — OXYCODONE HYDROCHLORIDE 5 MG: 5 TABLET ORAL at 04:06

## 2018-12-30 RX ADMIN — OXYBUTYNIN CHLORIDE 5 MG: 5 TABLET ORAL at 08:35

## 2018-12-30 RX ADMIN — ENOXAPARIN SODIUM 40 MG: 40 INJECTION SUBCUTANEOUS at 08:35

## 2018-12-30 RX ADMIN — PIPERACILLIN SODIUM,TAZOBACTAM SODIUM 3.38 G: 3; .375 INJECTION, POWDER, FOR SOLUTION INTRAVENOUS at 04:30

## 2018-12-30 RX ADMIN — GABAPENTIN 300 MG: 300 CAPSULE ORAL at 08:35

## 2018-12-30 RX ADMIN — OXYCODONE HYDROCHLORIDE 5 MG: 5 TABLET ORAL at 15:30

## 2018-12-30 ASSESSMENT — PAIN SCALES - GENERAL
PAINLEVEL_OUTOF10: 8
PAINLEVEL_OUTOF10: 8
PAINLEVEL_OUTOF10: 9
PAINLEVEL_OUTOF10: 8
PAINLEVEL_OUTOF10: 8

## 2018-12-31 ENCOUNTER — APPOINTMENT (OUTPATIENT)
Dept: GENERAL RADIOLOGY | Age: 48
DRG: 383 | End: 2018-12-31
Attending: INTERNAL MEDICINE
Payer: COMMERCIAL

## 2018-12-31 ENCOUNTER — APPOINTMENT (OUTPATIENT)
Dept: INTERVENTIONAL RADIOLOGY/VASCULAR | Age: 48
DRG: 383 | End: 2018-12-31
Attending: INTERNAL MEDICINE
Payer: COMMERCIAL

## 2018-12-31 LAB
ALBUMIN SERPL-MCNC: 2.8 G/DL (ref 3.9–4.9)
ANION GAP SERPL CALCULATED.3IONS-SCNC: 10 MEQ/L (ref 7–13)
BUN BLDV-MCNC: 22 MG/DL (ref 6–20)
CALCIUM SERPL-MCNC: 8.4 MG/DL (ref 8.6–10.2)
CHLORIDE BLD-SCNC: 102 MEQ/L (ref 98–107)
CO2: 28 MEQ/L (ref 22–29)
CREAT SERPL-MCNC: 0.76 MG/DL (ref 0.5–0.9)
GFR AFRICAN AMERICAN: >60
GFR NON-AFRICAN AMERICAN: >60
GLUCOSE BLD-MCNC: 115 MG/DL (ref 74–109)
HCT VFR BLD CALC: 35.8 % (ref 37–47)
HEMOGLOBIN: 11.7 G/DL (ref 12–16)
MAGNESIUM: 2.2 MG/DL (ref 1.7–2.3)
MCH RBC QN AUTO: 25.9 PG (ref 27–31.3)
MCHC RBC AUTO-ENTMCNC: 32.8 % (ref 33–37)
MCV RBC AUTO: 79.1 FL (ref 82–100)
PDW BLD-RTO: 15.6 % (ref 11.5–14.5)
PHOSPHORUS: 4 MG/DL (ref 2.5–4.5)
PLATELET # BLD: 162 K/UL (ref 130–400)
POTASSIUM SERPL-SCNC: 4.1 MEQ/L (ref 3.5–5.1)
RBC # BLD: 4.53 M/UL (ref 4.2–5.4)
SODIUM BLD-SCNC: 140 MEQ/L (ref 132–144)
WBC # BLD: 5.9 K/UL (ref 4.8–10.8)

## 2018-12-31 PROCEDURE — 2580000003 HC RX 258: Performed by: INTERNAL MEDICINE

## 2018-12-31 PROCEDURE — 2709999900 IR FLUORO GUIDED CVA DEVICE PLACEMENT

## 2018-12-31 PROCEDURE — G8978 MOBILITY CURRENT STATUS: HCPCS

## 2018-12-31 PROCEDURE — 6360000002 HC RX W HCPCS: Performed by: INTERNAL MEDICINE

## 2018-12-31 PROCEDURE — 76937 US GUIDE VASCULAR ACCESS: CPT

## 2018-12-31 PROCEDURE — 36415 COLL VENOUS BLD VENIPUNCTURE: CPT

## 2018-12-31 PROCEDURE — G8988 SELF CARE GOAL STATUS: HCPCS

## 2018-12-31 PROCEDURE — 1210000000 HC MED SURG R&B

## 2018-12-31 PROCEDURE — 99232 SBSQ HOSP IP/OBS MODERATE 35: CPT | Performed by: INTERNAL MEDICINE

## 2018-12-31 PROCEDURE — 97166 OT EVAL MOD COMPLEX 45 MIN: CPT

## 2018-12-31 PROCEDURE — 97162 PT EVAL MOD COMPLEX 30 MIN: CPT

## 2018-12-31 PROCEDURE — 80069 RENAL FUNCTION PANEL: CPT

## 2018-12-31 PROCEDURE — 83735 ASSAY OF MAGNESIUM: CPT

## 2018-12-31 PROCEDURE — 76937 US GUIDE VASCULAR ACCESS: CPT | Performed by: RADIOLOGY

## 2018-12-31 PROCEDURE — 77001 FLUOROGUIDE FOR VEIN DEVICE: CPT | Performed by: RADIOLOGY

## 2018-12-31 PROCEDURE — 36569 INSJ PICC 5 YR+ W/O IMAGING: CPT | Performed by: RADIOLOGY

## 2018-12-31 PROCEDURE — 85027 COMPLETE CBC AUTOMATED: CPT

## 2018-12-31 PROCEDURE — 99211 OFF/OP EST MAY X REQ PHY/QHP: CPT

## 2018-12-31 PROCEDURE — 36569 INSJ PICC 5 YR+ W/O IMAGING: CPT

## 2018-12-31 PROCEDURE — G8979 MOBILITY GOAL STATUS: HCPCS

## 2018-12-31 PROCEDURE — 6370000000 HC RX 637 (ALT 250 FOR IP): Performed by: INTERNAL MEDICINE

## 2018-12-31 PROCEDURE — 2500000003 HC RX 250 WO HCPCS: Performed by: INTERNAL MEDICINE

## 2018-12-31 PROCEDURE — G8987 SELF CARE CURRENT STATUS: HCPCS

## 2018-12-31 RX ADMIN — PIPERACILLIN SODIUM,TAZOBACTAM SODIUM 3.38 G: 3; .375 INJECTION, POWDER, FOR SOLUTION INTRAVENOUS at 16:37

## 2018-12-31 RX ADMIN — OXYBUTYNIN CHLORIDE 5 MG: 5 TABLET ORAL at 20:47

## 2018-12-31 RX ADMIN — KETOROLAC TROMETHAMINE 30 MG: 30 INJECTION, SOLUTION INTRAMUSCULAR at 16:25

## 2018-12-31 RX ADMIN — GABAPENTIN 300 MG: 300 CAPSULE ORAL at 08:59

## 2018-12-31 RX ADMIN — SODIUM CHLORIDE 250 ML: 9 INJECTION, SOLUTION INTRAVENOUS at 15:30

## 2018-12-31 RX ADMIN — LIDOCAINE HYDROCHLORIDE 5 ML: 20 INJECTION, SOLUTION INFILTRATION; PERINEURAL at 15:30

## 2018-12-31 RX ADMIN — FUROSEMIDE 40 MG: 40 TABLET ORAL at 09:00

## 2018-12-31 RX ADMIN — ESCITALOPRAM OXALATE 20 MG: 20 TABLET ORAL at 09:00

## 2018-12-31 RX ADMIN — PANTOPRAZOLE SODIUM 40 MG: 40 TABLET, DELAYED RELEASE ORAL at 09:01

## 2018-12-31 RX ADMIN — GABAPENTIN 300 MG: 300 CAPSULE ORAL at 16:34

## 2018-12-31 RX ADMIN — Medication 10 ML: at 20:48

## 2018-12-31 RX ADMIN — Medication 10 ML: at 09:09

## 2018-12-31 RX ADMIN — OXYCODONE HYDROCHLORIDE 5 MG: 5 TABLET ORAL at 20:47

## 2018-12-31 RX ADMIN — PIPERACILLIN SODIUM,TAZOBACTAM SODIUM 3.38 G: 3; .375 INJECTION, POWDER, FOR SOLUTION INTRAVENOUS at 09:06

## 2018-12-31 RX ADMIN — OXYCODONE HYDROCHLORIDE 5 MG: 5 TABLET ORAL at 10:23

## 2018-12-31 RX ADMIN — TOPIRAMATE 25 MG: 25 TABLET, FILM COATED ORAL at 09:00

## 2018-12-31 RX ADMIN — GABAPENTIN 300 MG: 300 CAPSULE ORAL at 20:47

## 2018-12-31 RX ADMIN — KETOROLAC TROMETHAMINE 30 MG: 30 INJECTION, SOLUTION INTRAMUSCULAR at 03:29

## 2018-12-31 RX ADMIN — MELOXICAM 15 MG: 7.5 TABLET ORAL at 09:02

## 2018-12-31 RX ADMIN — BUPROPION HYDROCHLORIDE 300 MG: 150 TABLET, EXTENDED RELEASE ORAL at 09:00

## 2018-12-31 RX ADMIN — OXYBUTYNIN CHLORIDE 5 MG: 5 TABLET ORAL at 09:00

## 2018-12-31 ASSESSMENT — PAIN DESCRIPTION - ORIENTATION
ORIENTATION: RIGHT;LEFT;LOWER
ORIENTATION: RIGHT;LEFT;LOWER

## 2018-12-31 ASSESSMENT — PAIN SCALES - GENERAL
PAINLEVEL_OUTOF10: 0
PAINLEVEL_OUTOF10: 8
PAINLEVEL_OUTOF10: 7
PAINLEVEL_OUTOF10: 7
PAINLEVEL_OUTOF10: 8

## 2018-12-31 ASSESSMENT — PAIN DESCRIPTION - PAIN TYPE
TYPE: CHRONIC PAIN
TYPE: CHRONIC PAIN

## 2018-12-31 ASSESSMENT — PAIN DESCRIPTION - LOCATION
LOCATION: LEG
LOCATION: LEG

## 2018-12-31 ASSESSMENT — PAIN DESCRIPTION - FREQUENCY: FREQUENCY: CONTINUOUS

## 2018-12-31 ASSESSMENT — PAIN DESCRIPTION - DESCRIPTORS: DESCRIPTORS: BURNING

## 2019-01-01 VITALS
HEIGHT: 65 IN | BODY MASS INDEX: 48.82 KG/M2 | DIASTOLIC BLOOD PRESSURE: 53 MMHG | HEART RATE: 91 BPM | WEIGHT: 293 LBS | OXYGEN SATURATION: 96 % | SYSTOLIC BLOOD PRESSURE: 117 MMHG | TEMPERATURE: 97.7 F | RESPIRATION RATE: 21 BRPM

## 2019-01-01 LAB
ALBUMIN SERPL-MCNC: 2.9 G/DL (ref 3.9–4.9)
ANION GAP SERPL CALCULATED.3IONS-SCNC: 12 MEQ/L (ref 7–13)
BUN BLDV-MCNC: 23 MG/DL (ref 6–20)
CALCIUM SERPL-MCNC: 8.5 MG/DL (ref 8.6–10.2)
CHLORIDE BLD-SCNC: 102 MEQ/L (ref 98–107)
CO2: 26 MEQ/L (ref 22–29)
CREAT SERPL-MCNC: 0.56 MG/DL (ref 0.5–0.9)
GFR AFRICAN AMERICAN: >60
GFR NON-AFRICAN AMERICAN: >60
GLUCOSE BLD-MCNC: 114 MG/DL (ref 74–109)
HCT VFR BLD CALC: 36.5 % (ref 37–47)
HEMOGLOBIN: 11.9 G/DL (ref 12–16)
MAGNESIUM: 2.2 MG/DL (ref 1.7–2.3)
MCH RBC QN AUTO: 25.8 PG (ref 27–31.3)
MCHC RBC AUTO-ENTMCNC: 32.6 % (ref 33–37)
MCV RBC AUTO: 79.3 FL (ref 82–100)
PDW BLD-RTO: 15.4 % (ref 11.5–14.5)
PHOSPHORUS: 3.8 MG/DL (ref 2.5–4.5)
PLATELET # BLD: 181 K/UL (ref 130–400)
POTASSIUM SERPL-SCNC: 4 MEQ/L (ref 3.5–5.1)
RBC # BLD: 4.61 M/UL (ref 4.2–5.4)
SODIUM BLD-SCNC: 140 MEQ/L (ref 132–144)
WBC # BLD: 6.2 K/UL (ref 4.8–10.8)

## 2019-01-01 PROCEDURE — 6370000000 HC RX 637 (ALT 250 FOR IP): Performed by: INTERNAL MEDICINE

## 2019-01-01 PROCEDURE — 85027 COMPLETE CBC AUTOMATED: CPT

## 2019-01-01 PROCEDURE — 2580000003 HC RX 258: Performed by: INTERNAL MEDICINE

## 2019-01-01 PROCEDURE — 36415 COLL VENOUS BLD VENIPUNCTURE: CPT

## 2019-01-01 PROCEDURE — 80069 RENAL FUNCTION PANEL: CPT

## 2019-01-01 PROCEDURE — 6360000002 HC RX W HCPCS: Performed by: INTERNAL MEDICINE

## 2019-01-01 PROCEDURE — 83735 ASSAY OF MAGNESIUM: CPT

## 2019-01-01 RX ORDER — GABAPENTIN 300 MG/1
300 CAPSULE ORAL 3 TIMES DAILY
Qty: 15 CAPSULE | Refills: 0 | Status: ON HOLD | OUTPATIENT
Start: 2019-01-01 | End: 2019-12-07

## 2019-01-01 RX ORDER — OXYCODONE HYDROCHLORIDE 5 MG/1
5 TABLET ORAL EVERY 6 HOURS PRN
Qty: 10 TABLET | Refills: 0 | Status: SHIPPED | OUTPATIENT
Start: 2019-01-01 | End: 2019-01-04

## 2019-01-01 RX ORDER — PIPERACILLIN SODIUM, TAZOBACTAM SODIUM 3; .375 G/15ML; G/15ML
3.38 INJECTION, POWDER, LYOPHILIZED, FOR SOLUTION INTRAVENOUS EVERY 8 HOURS
Qty: 131.625 G | Refills: 0 | DISCHARGE
Start: 2019-01-01 | End: 2019-01-14

## 2019-01-01 RX ADMIN — GABAPENTIN 300 MG: 300 CAPSULE ORAL at 13:12

## 2019-01-01 RX ADMIN — Medication 10 ML: at 08:58

## 2019-01-01 RX ADMIN — FUROSEMIDE 40 MG: 40 TABLET ORAL at 08:57

## 2019-01-01 RX ADMIN — GABAPENTIN 300 MG: 300 CAPSULE ORAL at 08:57

## 2019-01-01 RX ADMIN — ENOXAPARIN SODIUM 40 MG: 40 INJECTION SUBCUTANEOUS at 08:56

## 2019-01-01 RX ADMIN — OXYBUTYNIN CHLORIDE 5 MG: 5 TABLET ORAL at 08:57

## 2019-01-01 RX ADMIN — ESCITALOPRAM OXALATE 20 MG: 20 TABLET ORAL at 08:57

## 2019-01-01 RX ADMIN — KETOROLAC TROMETHAMINE 30 MG: 30 INJECTION, SOLUTION INTRAMUSCULAR at 08:57

## 2019-01-01 RX ADMIN — OXYCODONE HYDROCHLORIDE 5 MG: 5 TABLET ORAL at 13:12

## 2019-01-01 RX ADMIN — KETOROLAC TROMETHAMINE 30 MG: 30 INJECTION, SOLUTION INTRAMUSCULAR at 01:34

## 2019-01-01 RX ADMIN — MELOXICAM 15 MG: 7.5 TABLET ORAL at 08:57

## 2019-01-01 RX ADMIN — PIPERACILLIN SODIUM,TAZOBACTAM SODIUM 3.38 G: 3; .375 INJECTION, POWDER, FOR SOLUTION INTRAVENOUS at 01:34

## 2019-01-01 RX ADMIN — TOPIRAMATE 25 MG: 25 TABLET, FILM COATED ORAL at 08:57

## 2019-01-01 RX ADMIN — BUPROPION HYDROCHLORIDE 300 MG: 150 TABLET, EXTENDED RELEASE ORAL at 08:57

## 2019-01-01 RX ADMIN — PIPERACILLIN SODIUM,TAZOBACTAM SODIUM 3.38 G: 3; .375 INJECTION, POWDER, FOR SOLUTION INTRAVENOUS at 08:56

## 2019-01-01 ASSESSMENT — PAIN SCALES - GENERAL
PAINLEVEL_OUTOF10: 8

## 2019-01-01 ASSESSMENT — PAIN DESCRIPTION - PAIN TYPE: TYPE: CHRONIC PAIN

## 2019-01-11 ENCOUNTER — OFFICE VISIT (OUTPATIENT)
Dept: INFECTIOUS DISEASES | Age: 49
End: 2019-01-11
Payer: COMMERCIAL

## 2019-01-11 VITALS
DIASTOLIC BLOOD PRESSURE: 78 MMHG | RESPIRATION RATE: 16 BRPM | TEMPERATURE: 96.9 F | SYSTOLIC BLOOD PRESSURE: 150 MMHG | HEART RATE: 86 BPM | HEIGHT: 65 IN | BODY MASS INDEX: 83.2 KG/M2

## 2019-01-11 DIAGNOSIS — I83.012 VENOUS STASIS ULCER OF RIGHT CALF WITH FAT LAYER EXPOSED WITH VARICOSE VEINS (HCC): Chronic | ICD-10-CM

## 2019-01-11 DIAGNOSIS — L97.212 VENOUS STASIS ULCER OF RIGHT CALF WITH FAT LAYER EXPOSED WITH VARICOSE VEINS (HCC): Chronic | ICD-10-CM

## 2019-01-11 DIAGNOSIS — E66.01 MORBID OBESITY DUE TO EXCESS CALORIES (HCC): Chronic | ICD-10-CM

## 2019-01-11 DIAGNOSIS — L03.115 BILATERAL LOWER LEG CELLULITIS: Primary | ICD-10-CM

## 2019-01-11 DIAGNOSIS — L03.116 BILATERAL LOWER LEG CELLULITIS: Primary | ICD-10-CM

## 2019-01-11 PROCEDURE — G8427 DOCREV CUR MEDS BY ELIG CLIN: HCPCS | Performed by: INTERNAL MEDICINE

## 2019-01-11 PROCEDURE — G8417 CALC BMI ABV UP PARAM F/U: HCPCS | Performed by: INTERNAL MEDICINE

## 2019-01-11 PROCEDURE — G8484 FLU IMMUNIZE NO ADMIN: HCPCS | Performed by: INTERNAL MEDICINE

## 2019-01-11 PROCEDURE — 99213 OFFICE O/P EST LOW 20 MIN: CPT | Performed by: INTERNAL MEDICINE

## 2019-01-11 PROCEDURE — 1036F TOBACCO NON-USER: CPT | Performed by: INTERNAL MEDICINE

## 2019-01-14 ENCOUNTER — TELEPHONE (OUTPATIENT)
Dept: INFECTIOUS DISEASES | Age: 49
End: 2019-01-14

## 2019-01-15 ENCOUNTER — HOSPITAL ENCOUNTER (OUTPATIENT)
Dept: WOUND CARE | Age: 49
Discharge: HOME | End: 2019-01-15
Payer: COMMERCIAL

## 2019-01-15 VITALS
SYSTOLIC BLOOD PRESSURE: 121 MMHG | RESPIRATION RATE: 16 BRPM | TEMPERATURE: 98.7 F | WEIGHT: 293 LBS | HEART RATE: 103 BPM | DIASTOLIC BLOOD PRESSURE: 71 MMHG | HEIGHT: 65 IN | BODY MASS INDEX: 48.82 KG/M2

## 2019-01-15 PROCEDURE — 29580 STRAPPING UNNA BOOT: CPT

## 2019-01-15 RX ORDER — FLUOXETINE HYDROCHLORIDE 20 MG/1
20 CAPSULE ORAL DAILY
Status: ON HOLD | COMMUNITY
End: 2019-12-07

## 2019-01-15 RX ORDER — OXYCODONE HYDROCHLORIDE 5 MG/1
5 TABLET ORAL EVERY 6 HOURS PRN
COMMUNITY

## 2019-01-15 ASSESSMENT — PAIN DESCRIPTION - PAIN TYPE: TYPE: CHRONIC PAIN

## 2019-01-15 ASSESSMENT — PAIN SCALES - GENERAL: PAINLEVEL_OUTOF10: 4

## 2019-01-15 ASSESSMENT — PAIN DESCRIPTION - ORIENTATION: ORIENTATION: LEFT;RIGHT

## 2019-01-15 ASSESSMENT — PAIN DESCRIPTION - DESCRIPTORS: DESCRIPTORS: BURNING;TINGLING

## 2019-01-15 ASSESSMENT — PAIN DESCRIPTION - LOCATION: LOCATION: LEG

## 2019-01-15 ASSESSMENT — PAIN DESCRIPTION - FREQUENCY: FREQUENCY: INTERMITTENT

## 2019-01-21 ENCOUNTER — TELEPHONE (OUTPATIENT)
Dept: INFECTIOUS DISEASES | Age: 49
End: 2019-01-21

## 2019-01-24 ENCOUNTER — TELEPHONE (OUTPATIENT)
Dept: INFECTIOUS DISEASES | Age: 49
End: 2019-01-24

## 2019-02-04 ENCOUNTER — TELEPHONE (OUTPATIENT)
Dept: INFECTIOUS DISEASES | Age: 49
End: 2019-02-04

## 2019-02-05 RX ORDER — CIPROFLOXACIN 500 MG/1
500 TABLET, FILM COATED ORAL 2 TIMES DAILY
Qty: 42 TABLET | Refills: 0 | Status: SHIPPED | OUTPATIENT
Start: 2019-02-05 | End: 2019-02-26

## 2019-03-12 ENCOUNTER — HOSPITAL ENCOUNTER (OUTPATIENT)
Dept: WOUND CARE | Age: 49
Discharge: HOME | End: 2019-03-12
Payer: COMMERCIAL

## 2019-03-12 VITALS
DIASTOLIC BLOOD PRESSURE: 78 MMHG | TEMPERATURE: 98 F | RESPIRATION RATE: 20 BRPM | HEART RATE: 87 BPM | SYSTOLIC BLOOD PRESSURE: 138 MMHG

## 2019-03-12 PROCEDURE — 29580 STRAPPING UNNA BOOT: CPT

## 2019-03-12 ASSESSMENT — PAIN SCALES - GENERAL: PAINLEVEL_OUTOF10: 5

## 2019-03-12 ASSESSMENT — PAIN DESCRIPTION - LOCATION: LOCATION: LEG

## 2019-03-12 ASSESSMENT — PAIN DESCRIPTION - FREQUENCY: FREQUENCY: CONTINUOUS

## 2019-03-12 ASSESSMENT — PAIN DESCRIPTION - ORIENTATION: ORIENTATION: RIGHT;LEFT

## 2019-03-12 ASSESSMENT — PAIN DESCRIPTION - DESCRIPTORS: DESCRIPTORS: BURNING;ACHING

## 2019-03-12 ASSESSMENT — PAIN DESCRIPTION - PAIN TYPE: TYPE: CHRONIC PAIN

## 2019-03-18 ENCOUNTER — OFFICE VISIT (OUTPATIENT)
Dept: INFECTIOUS DISEASES | Age: 49
End: 2019-03-18
Payer: COMMERCIAL

## 2019-03-18 VITALS
TEMPERATURE: 97.7 F | RESPIRATION RATE: 20 BRPM | DIASTOLIC BLOOD PRESSURE: 61 MMHG | HEART RATE: 88 BPM | SYSTOLIC BLOOD PRESSURE: 100 MMHG | BODY MASS INDEX: 83.2 KG/M2 | HEIGHT: 65 IN

## 2019-03-18 DIAGNOSIS — I83.012 VENOUS STASIS ULCER OF RIGHT CALF WITH FAT LAYER EXPOSED WITH VARICOSE VEINS (HCC): ICD-10-CM

## 2019-03-18 DIAGNOSIS — L03.116 BILATERAL LOWER LEG CELLULITIS: Primary | ICD-10-CM

## 2019-03-18 DIAGNOSIS — L97.212 VENOUS STASIS ULCER OF RIGHT CALF WITH FAT LAYER EXPOSED WITH VARICOSE VEINS (HCC): ICD-10-CM

## 2019-03-18 DIAGNOSIS — L03.115 BILATERAL LOWER LEG CELLULITIS: Primary | ICD-10-CM

## 2019-03-18 DIAGNOSIS — E66.01 MORBID OBESITY DUE TO EXCESS CALORIES (HCC): ICD-10-CM

## 2019-03-18 PROCEDURE — G8417 CALC BMI ABV UP PARAM F/U: HCPCS | Performed by: INTERNAL MEDICINE

## 2019-03-18 PROCEDURE — 1036F TOBACCO NON-USER: CPT | Performed by: INTERNAL MEDICINE

## 2019-03-18 PROCEDURE — G8484 FLU IMMUNIZE NO ADMIN: HCPCS | Performed by: INTERNAL MEDICINE

## 2019-03-18 PROCEDURE — 99213 OFFICE O/P EST LOW 20 MIN: CPT | Performed by: INTERNAL MEDICINE

## 2019-03-18 PROCEDURE — G8427 DOCREV CUR MEDS BY ELIG CLIN: HCPCS | Performed by: INTERNAL MEDICINE

## 2019-03-18 ASSESSMENT — ENCOUNTER SYMPTOMS: COLOR CHANGE: 1

## 2019-03-30 ENCOUNTER — HOSPITAL ENCOUNTER (EMERGENCY)
Age: 49
Discharge: HOME | End: 2019-03-30
Attending: EMERGENCY MEDICINE
Payer: COMMERCIAL

## 2019-03-30 VITALS
RESPIRATION RATE: 18 BRPM | SYSTOLIC BLOOD PRESSURE: 114 MMHG | WEIGHT: 293 LBS | OXYGEN SATURATION: 100 % | HEIGHT: 65 IN | DIASTOLIC BLOOD PRESSURE: 56 MMHG | TEMPERATURE: 98.2 F | HEART RATE: 99 BPM | BODY MASS INDEX: 48.82 KG/M2

## 2019-03-30 DIAGNOSIS — A09 DIARRHEA OF INFECTIOUS ORIGIN: Primary | ICD-10-CM

## 2019-03-30 LAB
ALBUMIN SERPL-MCNC: 3.7 G/DL (ref 3.5–4.6)
ALP BLD-CCNC: 62 U/L (ref 40–130)
ALT SERPL-CCNC: 14 U/L (ref 0–33)
ANION GAP SERPL CALCULATED.3IONS-SCNC: 14 MEQ/L (ref 9–15)
AST SERPL-CCNC: 19 U/L (ref 0–35)
BASOPHILS ABSOLUTE: 0.1 K/UL (ref 0–0.2)
BASOPHILS RELATIVE PERCENT: 0.7 %
BILIRUB SERPL-MCNC: 0.5 MG/DL (ref 0.2–0.7)
BUN BLDV-MCNC: 10 MG/DL (ref 6–20)
CALCIUM SERPL-MCNC: 9.1 MG/DL (ref 8.5–9.9)
CHLORIDE BLD-SCNC: 99 MEQ/L (ref 95–107)
CO2: 25 MEQ/L (ref 20–31)
CREAT SERPL-MCNC: 0.48 MG/DL (ref 0.5–0.9)
EOSINOPHILS ABSOLUTE: 0.2 K/UL (ref 0–0.7)
EOSINOPHILS RELATIVE PERCENT: 2.2 %
GFR AFRICAN AMERICAN: >60
GFR NON-AFRICAN AMERICAN: >60
GLOBULIN: 4.4 G/DL (ref 2.3–3.5)
GLUCOSE BLD-MCNC: 96 MG/DL (ref 70–99)
HCT VFR BLD CALC: 44 % (ref 37–47)
HEMOGLOBIN: 13.9 G/DL (ref 12–16)
LIPASE: 13 U/L (ref 12–95)
LYMPHOCYTES ABSOLUTE: 1.9 K/UL (ref 1–4.8)
LYMPHOCYTES RELATIVE PERCENT: 22.5 %
MCH RBC QN AUTO: 24.9 PG (ref 27–31.3)
MCHC RBC AUTO-ENTMCNC: 31.5 % (ref 33–37)
MCV RBC AUTO: 79 FL (ref 82–100)
MONOCYTES ABSOLUTE: 0.6 K/UL (ref 0.2–0.8)
MONOCYTES RELATIVE PERCENT: 7.8 %
NEUTROPHILS ABSOLUTE: 5.5 K/UL (ref 1.4–6.5)
NEUTROPHILS RELATIVE PERCENT: 66.8 %
PDW BLD-RTO: 16.7 % (ref 11.5–14.5)
PLATELET # BLD: 201 K/UL (ref 130–400)
POTASSIUM SERPL-SCNC: 3.8 MEQ/L (ref 3.4–4.9)
RBC # BLD: 5.57 M/UL (ref 4.2–5.4)
SODIUM BLD-SCNC: 138 MEQ/L (ref 135–144)
TOTAL PROTEIN: 8.1 G/DL (ref 6.3–8)
WBC # BLD: 8.3 K/UL (ref 4.8–10.8)

## 2019-03-30 PROCEDURE — 96374 THER/PROPH/DIAG INJ IV PUSH: CPT

## 2019-03-30 PROCEDURE — 99283 EMERGENCY DEPT VISIT LOW MDM: CPT

## 2019-03-30 PROCEDURE — 85025 COMPLETE CBC W/AUTO DIFF WBC: CPT

## 2019-03-30 PROCEDURE — 6360000002 HC RX W HCPCS: Performed by: EMERGENCY MEDICINE

## 2019-03-30 PROCEDURE — 36415 COLL VENOUS BLD VENIPUNCTURE: CPT

## 2019-03-30 PROCEDURE — 80053 COMPREHEN METABOLIC PANEL: CPT

## 2019-03-30 PROCEDURE — 83690 ASSAY OF LIPASE: CPT

## 2019-03-30 PROCEDURE — 2580000003 HC RX 258: Performed by: EMERGENCY MEDICINE

## 2019-03-30 RX ORDER — METRONIDAZOLE 500 MG/1
500 TABLET ORAL 3 TIMES DAILY
Qty: 21 TABLET | Refills: 0 | Status: SHIPPED | OUTPATIENT
Start: 2019-03-30 | End: 2019-04-06

## 2019-03-30 RX ORDER — ONDANSETRON 4 MG/1
4 TABLET, ORALLY DISINTEGRATING ORAL EVERY 8 HOURS PRN
Qty: 20 TABLET | Refills: 0 | Status: ON HOLD | OUTPATIENT
Start: 2019-03-30 | End: 2019-12-07

## 2019-03-30 RX ORDER — 0.9 % SODIUM CHLORIDE 0.9 %
1000 INTRAVENOUS SOLUTION INTRAVENOUS ONCE
Status: COMPLETED | OUTPATIENT
Start: 2019-03-30 | End: 2019-03-30

## 2019-03-30 RX ORDER — ONDANSETRON 2 MG/ML
4 INJECTION INTRAMUSCULAR; INTRAVENOUS ONCE
Status: COMPLETED | OUTPATIENT
Start: 2019-03-30 | End: 2019-03-30

## 2019-03-30 RX ADMIN — ONDANSETRON 4 MG: 2 INJECTION INTRAMUSCULAR; INTRAVENOUS at 21:11

## 2019-03-30 RX ADMIN — SODIUM CHLORIDE 1000 ML: 9 INJECTION, SOLUTION INTRAVENOUS at 21:11

## 2019-03-30 ASSESSMENT — ENCOUNTER SYMPTOMS
EYE DISCHARGE: 0
VOMITING: 0
CHEST TIGHTNESS: 0
WHEEZING: 0
COUGH: 0
SORE THROAT: 0
PHOTOPHOBIA: 0
SHORTNESS OF BREATH: 0
DIARRHEA: 1
ABDOMINAL PAIN: 0
ABDOMINAL DISTENTION: 0
NAUSEA: 1

## 2019-03-30 ASSESSMENT — PAIN SCALES - GENERAL: PAINLEVEL_OUTOF10: 8

## 2019-03-30 ASSESSMENT — PAIN DESCRIPTION - ORIENTATION: ORIENTATION: MID

## 2019-03-30 ASSESSMENT — PAIN DESCRIPTION - DESCRIPTORS: DESCRIPTORS: CRAMPING

## 2019-03-30 ASSESSMENT — PAIN DESCRIPTION - LOCATION: LOCATION: ABDOMEN

## 2019-03-31 NOTE — ED PROVIDER NOTES
[address] [facility] ED  eMERGENCY dEPARTMENT eNCOUnter      Pt Name: Dave Marie  MRN: [id number]  [place] 1970  Date of evaluation: 3/30/2019  Provider: Marly Boo MD    [address] [place] [address]       Chief Complaint   Patient presents with    Abdominal Pain     pain to upper abd area since monday unable to eat,  nausea loose stool         HISTORY OF PRESENT ILLNESS   (Location/Symptom, Timing/Onset,Context/Setting, Quality, Duration, Modifying Factors, Severity)  Note limiting factors. Dave Marie is a 53 y.o. female who presents to the emergency department for evaluation of abdominal pain. Patient describes abdominal cramping for the past week. She is morbidly obese which is lead to chronic lymphedema and leg infections. She just spent 4 weeks in the nursing home to get IV antibiotic therapy for her chronic leg edema failing outpatient therapy. She was sent home on doxycycline and released a week ago. She states since then she's not pale take her medications because she is constantly nauseated with some loose stools. She feels like she is dehydrated also. There is really no vomiting. She just doesn't feel like eating or drinking. He is not really abdominal pain but more cramping. Symptoms worse if she tries to eat or drink. HPI    NursingNotes were reviewed. REVIEW OF SYSTEMS    (2-9 systems for level 4, 10 or more for level 5)     Review of Systems   Constitutional: Negative for chills and diaphoresis. HENT: Negative for congestion, ear pain, mouth sores and sore throat. Eyes: Negative for photophobia and discharge. Respiratory: Negative for cough, chest tightness, shortness of breath and wheezing. Cardiovascular: Negative for chest pain and palpitations. Gastrointestinal: Positive for diarrhea and nausea. Negative for abdominal distention, abdominal pain and vomiting. Endocrine: Negative for cold intolerance. Genitourinary: Negative for difficulty urinating. Musculoskeletal: Negative for arthralgias. Skin: Negative for pallor and rash. Allergic/Immunologic: Negative for immunocompromised state. Neurological: Negative for dizziness and syncope. Hematological: Negative for adenopathy. Psychiatric/Behavioral: Negative for agitation and hallucinations. All other systems reviewed and are negative. Except as noted above the remainder of the review of systems was reviewed and negative. PAST MEDICAL HISTORY     Past Medical History:   Diagnosis Date    Morbid obesity ([address]     Overactive bladder     Psychiatric problem          SURGICALHISTORY       Past Surgical History:   Procedure Laterality Date    ABDOMEN SURGERY      gastric bypass surgery    BACK SURGERY      CHOLECYSTECTOMY           CURRENT MEDICATIONS       Previous Medications    ACETAMINOPHEN (TYLENOL) 500 MG TABLET    Take 1,000 mg by mouth    BUPROPION (WELLBUTRIN XL) 300 MG EXTENDED RELEASE TABLET    Take 300 mg by mouth every morning     DIPHENHYDRAMINE (BENADRYL) 25 MG CAPSULE    Take 25 mg by mouth every 6 hours as needed    ESCITALOPRAM (LEXAPRO) 20 MG TABLET    Take 20 mg by mouth daily     FLUOXETINE (PROZAC) 20 MG CAPSULE    Take 20 mg by mouth daily    FUROSEMIDE (LASIX) 40 MG TABLET    TAKE ONE TABLET BY MOUTH EVERY DAY for severe leg swelling    GABAPENTIN (NEURONTIN) 300 MG CAPSULE    Take 1 capsule by mouth 3 times daily for 5 days. .    MELOXICAM (MOBIC) 15 MG TABLET    Take 15 mg by mouth daily    OMEPRAZOLE (PRILOSEC) 20 MG DELAYED RELEASE CAPSULE    Take 20 mg by mouth daily as needed     OXYBUTYNIN (DITROPAN) 5 MG TABLET    Take 5 mg by mouth 2 times daily    OXYCODONE (ROXICODONE) 5 MG IMMEDIATE RELEASE TABLET    Take 5 mg by mouth every 6 hours as needed for Pain. Malina Cleveland     PIPERACILLIN-TAZOBACTAM (ZOSYN) 3-0.375 GM PER 50ML IVPB    Infuse 3.375 g intravenously every 8 hours    POTASSIUM CHLORIDE (KLOR-CON M) 20 MEQ EXTENDED RELEASE TABLET    Take 20 mEq by mouth daily SODIUM CHLORIDE, EXTERNAL, 0.9 % SOLN    Apply 1 Applicatorful topically daily    TOPIRAMATE (TOPAMAX) 25 MG TABLET    Take 25 mg by mouth daily     VITAMIN C (ASCORBIC ACID) 500 MG TABLET    Take 500 mg by mouth daily       ALLERGIES     Vancomycin and Keflex [cephalexin]    FAMILY HISTORY     History reviewed. No pertinent family history. SOCIAL HISTORY       Social History     Socioeconomic History    Marital status:      Spouse name: None    Number of children: None    Years of education: None    Highest education level: None   Occupational History    None   Social Needs    Financial resource strain: None    Food insecurity:     Worry: None     Inability: None    Transportation needs:     Medical: None     Non-medical: None   Tobacco Use    Smoking status: Never Smoker    Smokeless tobacco: Never Used   Substance and Sexual Activity    Alcohol use: Yes     Comment: occasional    Drug use: No    Sexual activity: None   Lifestyle    Physical activity:     Days per week: None     Minutes per session: None    Stress: None   Relationships    Social connections:     Talks on phone: None     Gets together: None     Attends Caodaism service: None     Active member of club or organization: None     Attends meetings of clubs or organizations: None     Relationship status: None    Intimate partner violence:     Fear of current or ex partner: None     Emotionally abused: None     Physically abused: None     Forced sexual activity: None   Other Topics Concern    None   Social History Narrative    Lives w        SCREENINGS      @FLOW([id number])@      PHYSICAL EXAM    (up to 7 for level 4, 8 or more for level 5)     ED Triage Vitals [03/30/19 1959]   BP Temp Temp Source Pulse Resp SpO2 Height Weight   (!) 114/56 98.2 °F (36.8 °C) Oral 99 18 100 % 5' 5\" (1.651 m) (!) 500 lb (226.8 kg)       Physical Exam   Constitutional: She is oriented to person, place, and time.  She appears well-developed. No distress. Patient is morbidly obese. examination the abdomen difficult. It does not seem distended. I don't feel any large hernias. HENT:   Head: Normocephalic. Nose: Nose normal.   Eyes: Pupils are equal, round, and reactive to light. Conjunctivae are normal.   Neck: Normal range of motion. Neck supple. No JVD present. Cardiovascular: Normal rate, regular rhythm, normal heart sounds and intact distal pulses. Pulmonary/Chest: Effort normal and breath sounds normal. She has no rales. Abdominal: Soft. Bowel sounds are normal. There is no tenderness. There is no guarding. No hernia. Musculoskeletal: Normal range of motion. She exhibits edema. Severe chronic leg edema. I don't see any evidence of cellulitis. Neurological: She is alert and oriented to person, place, and time. Skin: Skin is warm and dry. Capillary refill takes less than 2 seconds. Psychiatric: She has a normal mood and affect. Nursing note and vitals reviewed.       DIAGNOSTIC RESULTS     EKG: All EKG's are interpreted by the Emergency Department Physician who either signs or Co-signsthis chart in the absence of a cardiologist.        RADIOLOGY:   Thiago Cadet such as CT, Ultrasound and MRI are read by the radiologist. Plain radiographic images are visualized and preliminarily interpreted by the emergency physician with the below findings:        Interpretation per the Radiologist below, if available at the time ofthis note:    No orders to display         ED BEDSIDE ULTRASOUND:   Performed by ED Physician - none    LABS:  Labs Reviewed   COMPREHENSIVE METABOLIC PANEL - Abnormal; Notable for the following components:       Result Value    CREATININE 0.48 (*)     Total Protein 8.1 (*)     Globulin 4.4 (*)     All other components within normal limits   CBC WITH AUTO DIFFERENTIAL - Abnormal; Notable for the following components:    RBC 5.57 (*)     MCV 79.0 (*)     MCH 24.9 (*)     MCHC 31.5 (*)     RDW 16.7 (*)     All other components within normal limits   C DIFF TOXIN B BY RT PCR   LIPASE       All other labs were within normal range or not returned as of this dictation. EMERGENCY DEPARTMENT COURSE and DIFFERENTIAL DIAGNOSIS/MDM:   Vitals:    Vitals:    03/30/19 1959   BP: (!) 114/56   Pulse: 99   Resp: 18   Temp: 98.2 °F (36.8 °C)   TempSrc: Oral   SpO2: 100%   Weight: (!) 500 lb (226.8 kg)   Height: 5' 5\" (1.651 m)            MDM on recheck patient's had no vomiting here. No diarrhea here. She was given IV fluids tolerated that well. Her labs are normal.  She has a normal bicarbonate. Normal potassium. At this time she is treated for potential C. diff given her long-standing antibiotic treatment. follow up with her primary care physician. Given zofran for home        CONSULTS:  None    PROCEDURES:  Unless otherwise noted below, none     Procedures    FINAL IMPRESSION      1. Diarrhea of infectious origin          DISPOSITION/PLAN   DISPOSITION Decision To Discharge 03/30/2019 10:13:26 PM      PATIENT REFERRED TO:  Shmuel Roger [address]   [address] [place] [address]  [phone]    In 2 days        DISCHARGE MEDICATIONS:  New Prescriptions    METRONIDAZOLE (FLAGYL) 500 MG TABLET    Take 1 tablet by mouth 3 times daily for 7 days    ONDANSETRON (ZOFRAN ODT) 4 MG DISINTEGRATING TABLET    Take 1 tablet by mouth every 8 hours as needed for Nausea          (Please note that portions of this note were completed with a voice recognition program.  Efforts were made to edit the dictations but occasionally words are mis-transcribed.)    Nury Hernandez MD (electronically signed)  Attending Emergency Physician        Nury Hernandez MD  03/30/19 0949       Nury Hernandez MD  03/30/19 1320

## 2019-04-02 ENCOUNTER — TELEPHONE (OUTPATIENT)
Dept: INFECTIOUS DISEASES | Age: 49
End: 2019-04-02

## 2019-04-02 NOTE — TELEPHONE ENCOUNTER
Patient states she has had Diarrhea and Upset stomach ongoing for about One week. Went to [facility] ER on Sat. 3/30/19. Was told she has C-Diff. Wants to Update Dr Joe Webster. Pt still feeling very ill,      Patient is allergic to Vancomycin, Was given Flagyl and Zofran from the ER visit. No Stool specimen tested. Pt Denies Fever, positive for Chills. Has not been able to eat Anything.

## 2019-04-03 ENCOUNTER — TELEPHONE (OUTPATIENT)
Dept: INFECTIOUS DISEASES | Age: 49
End: 2019-04-03

## 2019-04-03 DIAGNOSIS — R19.7 DIARRHEA IN ADULT PATIENT: Primary | ICD-10-CM

## 2019-04-03 NOTE — TELEPHONE ENCOUNTER
Per consult with Dr Danny Ny, concerning the Diarrhea, she states to have stool tested for C-diff. Stop taking the Doxy, if not already completed. Keep F/u appt on 4-15-19. Return call to patient, Unable to leave a message as V/m box is Full. Will attempt another call. Will place order for C-diff.

## 2019-04-09 NOTE — TELEPHONE ENCOUNTER
Return call to patient, she states her PCP advised her to go to the ER. She was admitted to [facility] from 4/4-4/8, and D/c to SNF[facility]. She Tested Negative for C-Diff. Reminded patient of F/u appt with Dr Liza Devlin on 4/15/19 at 11:45 am.   She verbalized understanding.

## 2019-04-15 ENCOUNTER — OFFICE VISIT (OUTPATIENT)
Dept: INFECTIOUS DISEASES | Age: 49
End: 2019-04-15
Payer: COMMERCIAL

## 2019-04-15 VITALS
TEMPERATURE: 97.7 F | DIASTOLIC BLOOD PRESSURE: 80 MMHG | SYSTOLIC BLOOD PRESSURE: 138 MMHG | RESPIRATION RATE: 22 BRPM | HEART RATE: 83 BPM | HEIGHT: 65 IN | BODY MASS INDEX: 83.2 KG/M2

## 2019-04-15 DIAGNOSIS — L03.115 BILATERAL LOWER LEG CELLULITIS: Primary | ICD-10-CM

## 2019-04-15 DIAGNOSIS — I83.012 VENOUS STASIS ULCER OF RIGHT CALF WITH FAT LAYER EXPOSED WITH VARICOSE VEINS (HCC): ICD-10-CM

## 2019-04-15 DIAGNOSIS — E66.01 MORBID OBESITY DUE TO EXCESS CALORIES (HCC): ICD-10-CM

## 2019-04-15 DIAGNOSIS — L03.116 BILATERAL LOWER LEG CELLULITIS: Primary | ICD-10-CM

## 2019-04-15 DIAGNOSIS — L97.212 VENOUS STASIS ULCER OF RIGHT CALF WITH FAT LAYER EXPOSED WITH VARICOSE VEINS (HCC): ICD-10-CM

## 2019-04-15 PROCEDURE — 1036F TOBACCO NON-USER: CPT | Performed by: INTERNAL MEDICINE

## 2019-04-15 PROCEDURE — 99213 OFFICE O/P EST LOW 20 MIN: CPT | Performed by: INTERNAL MEDICINE

## 2019-04-15 PROCEDURE — G8417 CALC BMI ABV UP PARAM F/U: HCPCS | Performed by: INTERNAL MEDICINE

## 2019-04-15 PROCEDURE — G8427 DOCREV CUR MEDS BY ELIG CLIN: HCPCS | Performed by: INTERNAL MEDICINE

## 2019-04-15 ASSESSMENT — ENCOUNTER SYMPTOMS: COLOR CHANGE: 1

## 2019-04-15 NOTE — PROGRESS NOTES
Subjective:      Patient ID: Ludy Pina is a 53 y.o. female. HPI  F/U BLE cellulitis and chronic venous stasis ulcers with excellent healing. Had C diff that was treated and resolved. Will be seeing a surgeon for bariatric surgery. PMHx, allergies and social Hx were reviewed    Review of Systems   Musculoskeletal: Positive for arthralgias (L knee pain). Skin: Positive for color change and wound. Neurological: Positive for weakness. All other systems reviewed and are negative. Objective:   Physical Exam   Constitutional: She is oriented to person, place, and time. No distress. HENT:   Mouth/Throat: No oropharyngeal exudate. Eyes: No scleral icterus. Neck: Neck supple. Pulmonary/Chest: Effort normal. No respiratory distress. Abdominal: Soft. There is no tenderness (obese. large pannus). Musculoskeletal: She exhibits edema (BLE, L>R). She exhibits no tenderness. Neurological: She is alert and oriented to person, place, and time. Skin: No rash noted. No erythema. Psychiatric: She has a normal mood and affect. Her behavior is normal.   Nursing note and vitals reviewed.   B legs with brown discoloration  L leg much smaller ulcers, superficial, healing, no drainage    Assessment:      BLE cellulitis, resolved  BLE venous stasis ulcers  Super morbid obesity      Plan:      Continue local wound care  Keep off antibiotics  Agree with bariatric surgery        Rivera Chang MD

## 2019-05-07 ENCOUNTER — HOSPITAL ENCOUNTER (OUTPATIENT)
Dept: WOUND CARE | Age: 49
Discharge: HOME | End: 2019-05-07
Payer: COMMERCIAL

## 2019-05-07 VITALS
HEART RATE: 80 BPM | WEIGHT: 293 LBS | DIASTOLIC BLOOD PRESSURE: 75 MMHG | SYSTOLIC BLOOD PRESSURE: 143 MMHG | BODY MASS INDEX: 48.82 KG/M2 | HEIGHT: 65 IN | RESPIRATION RATE: 20 BRPM | TEMPERATURE: 98.6 F

## 2019-05-07 DIAGNOSIS — I89.0 LYMPH EDEMA: Chronic | ICD-10-CM

## 2019-05-07 DIAGNOSIS — I87.2 VENOUS INSUFFICIENCY OF BOTH LOWER EXTREMITIES: ICD-10-CM

## 2019-05-07 DIAGNOSIS — E66.01 MORBID OBESITY DUE TO EXCESS CALORIES (HCC): Primary | Chronic | ICD-10-CM

## 2019-05-07 PROCEDURE — 99212 OFFICE O/P EST SF 10 MIN: CPT

## 2019-05-07 ASSESSMENT — PAIN SCALES - GENERAL: PAINLEVEL_OUTOF10: 0

## 2019-05-07 NOTE — PROGRESS NOTES
[facility]   Progress Note and Procedure Note      Dave Lagunas  MEDICAL RECORD NUMBER:  [id number]  AGE: 53 y.o. GENDER: female  : 1970  EPISODE DATE:  2019    Subjective:     Chief Complaint   Patient presents with    Wound Check     right and left leg         HISTORY of PRESENT ILLNESS HPI     Dave Lagunas is a 53 y.o. female who presents today for wound/ulcer evaluation. History of Wound Context: Bilaeral venous stasis ulcers bilateral.    Wound/Ulcer Pain Timing/Severity: constant  Quality of pain: sharp, burning  Severity:  5 / 10   Modifying Factors: Pain worsens with walking  Associated Signs/Symptoms: edema, erythema, drainage and obesity    Ulcer Identification:  Ulcer Type: venous and lymphedema  Contributing Factors: edema, venous stasis, lymphedema and obesity    Wound: N/A        PAST MEDICAL HISTORY        Diagnosis Date    Morbid obesity ([address]     Overactive bladder     Psychiatric problem        PAST SURGICAL HISTORY    Past Surgical History:   Procedure Laterality Date    ABDOMEN SURGERY      gastric bypass surgery    BACK SURGERY      CHOLECYSTECTOMY         FAMILY HISTORY    History reviewed. No pertinent family history. SOCIAL HISTORY    Social History     Tobacco Use    Smoking status: Never Smoker    Smokeless tobacco: Never Used   Substance Use Topics    Alcohol use: Yes     Comment: occasional    Drug use: No       ALLERGIES    Allergies   Allergen Reactions    Vancomycin Rash    Keflex [Cephalexin] Itching and Rash       MEDICATIONS    Current Outpatient Medications on File Prior to Encounter   Medication Sig Dispense Refill    ondansetron (ZOFRAN ODT) 4 MG disintegrating tablet Take 1 tablet by mouth every 8 hours as needed for Nausea 20 tablet 0    FLUoxetine (PROZAC) 20 MG capsule Take 20 mg by mouth daily      oxyCODONE (ROXICODONE) 5 MG immediate release tablet Take 5 mg by mouth every 6 hours as needed for Pain. .      gabapentin (NEURONTIN) 300 MG capsule Take 1 capsule by mouth 3 times daily for 5 days. . (Patient taking differently: Take 300 mg by mouth 3 times daily. Dawn Challenger ) 15 capsule 0    potassium chloride (KLOR-CON M) 20 MEQ extended release tablet Take 20 mEq by mouth daily      vitamin C (ASCORBIC ACID) 500 MG tablet Take 500 mg by mouth daily      diphenhydrAMINE (BENADRYL) 25 MG capsule Take 25 mg by mouth every 6 hours as needed      topiramate (TOPAMAX) 25 MG tablet Take 25 mg by mouth daily       SODIUM CHLORIDE, EXTERNAL, 0.9 % SOLN Apply 1 Applicatorful topically daily 1000 mL 5    meloxicam (MOBIC) 15 MG tablet Take 15 mg by mouth daily      omeprazole (PRILOSEC) 20 MG delayed release capsule Take 20 mg by mouth daily as needed       acetaminophen (TYLENOL) 500 MG tablet Take 1,000 mg by mouth      buPROPion (WELLBUTRIN XL) 300 MG extended release tablet Take 300 mg by mouth every morning       escitalopram (LEXAPRO) 20 MG tablet Take 20 mg by mouth daily       furosemide (LASIX) 40 MG tablet TAKE ONE TABLET BY MOUTH EVERY DAY for severe leg swelling      oxybutynin (DITROPAN) 5 MG tablet Take 5 mg by mouth 2 times daily       No current facility-administered medications on file prior to encounter. REVIEW OF SYSTEMS    Pertinent items are noted in HPI. Objective:      BP (!) 143/75   Pulse 80   Temp 98.6 °F (37 °C) (Temporal)   Resp 20   Ht 5' 5\" (1.651 m)   Wt (!) 466 lb (211.4 kg)   BMI 77.55 kg/m²     Wt Readings from Last 3 Encounters:   05/07/19 (!) 466 lb (211.4 kg)   03/30/19 (!) 500 lb (226.8 kg)   01/15/19 (!) 500 lb (226.8 kg)       PHYSICAL EXAM    Constitutional:   Well nourished and well developed. Appears neat and clean. Patient is alert, oriented x3, and in no apparent distress. Respiratory:  Respiratory effort is easy and symmetric bilaterally. Rate is normal at rest and on room air. Vascular:  Pedal Pulses is palpable and audible signal noted with doppler.   Capillary refill is <5 sec to digits bilateral.  Extremities negative for pitting edema. Neurological:  Gross and Light touch intact. Protective sensation intact. Dermatological:  Wound description noted in wound assessment. THe wounds are all healed. .   Assessment:      Patient Active Problem List   Diagnosis Code    Morbid obesity due to excess calories ([facility]) E66.01    Venous stasis ulcer of right calf with fat layer exposed with varicose veins ([facility]) I83.012, L97.212    Venous stasis ulcer of left calf ([facility]) I83.022, L97.229    Venous insufficiency of both lower extremities I87.2    Lymph edema I89.0    Bilateral lower leg cellulitis L03.116, L03.115        Procedure Note  Indications:  Based on my examination of this patient's wound(s)/ulcer(s) today, debridement is not required to promote healing and evaluate the wound base. Wound 11/20/18 Leg Left; Lower; Lateral #4 (Active)   Wound Image   5/7/2019  3:19 PM   Wound Venous 5/7/2019  3:19 PM   Wound Cleansed Rinsed/Irrigated with saline 5/7/2019  3:19 PM   Wound Length (cm) 0 cm 5/7/2019  3:19 PM   Wound Width (cm) 0 cm 5/7/2019  3:19 PM   Wound Depth (cm) 0 cm 5/7/2019  3:19 PM   Wound Surface Area (cm^2) 0 cm^2 5/7/2019  3:19 PM   Change in Wound Size % (l*w) 100 5/7/2019  3:19 PM   Wound Volume (cm^3) 0 cm^3 5/7/2019  3:19 PM   Wound Healing % 100 5/7/2019  3:19 PM   Drainage Amount None 5/7/2019  3:19 PM   Drainage Description Yellow;Serous 3/12/2019  3:10 PM   Odor Mild 3/12/2019  3:10 PM   Margins Undefined edges 3/12/2019  3:10 PM   Radha-wound Assessment Intact; Red 3/12/2019  3:10 PM   Non-staged Wound Description Full thickness 3/12/2019  3:10 PM   [place]%Wound Bed 90 12/18/2018  3:22 PM   Red%Wound Bed 60 3/12/2019  3:10 PM   Yellow%Wound Bed 10 12/18/2018  3:22 PM   Other%Wound Bed 40%white 3/12/2019  3:10 PM   Number of days: 168       Wound 11/20/18 Leg Right;Lateral #5 (Active)   Wound Image   5/7/2019  3:19 PM   Wound Venous 5/7/2019  3:19 PM   Wound Cleansed Rinsed/Irrigated with saline 5/7/2019  3:19 PM   Wound Length (cm) 0 cm 5/7/2019  3:19 PM   Wound Width (cm) 0 cm 5/7/2019  3:19 PM   Wound Depth (cm) 0 cm 5/7/2019  3:19 PM   Wound Surface Area (cm^2) 0 cm^2 5/7/2019  3:19 PM   Change in Wound Size % (l*w) 100 5/7/2019  3:19 PM   Wound Volume (cm^3) 0 cm^3 5/7/2019  3:19 PM   Wound Healing % 100 5/7/2019  3:19 PM   Drainage Amount None 5/7/2019  3:19 PM   Drainage Description Yellow 3/12/2019  3:10 PM   Odor Mild 3/12/2019  3:10 PM   Margins Undefined edges 3/12/2019  3:10 PM   Radha-wound Assessment Intact;Fragile 3/12/2019  3:10 PM   Non-staged Wound Description Full thickness 3/12/2019  3:10 PM   Red%Wound Bed 90 3/12/2019  3:10 PM   Yellow%Wound Bed 10 3/12/2019  3:10 PM   Number of days: 168       Wound 12/18/18 Leg Right; Anterior #6 (Active)   Wound Image   5/7/2019  3:19 PM   Wound Venous 5/7/2019  3:19 PM   Wound Cleansed Rinsed/Irrigated with saline 5/7/2019  3:19 PM   Wound Length (cm) 0 cm 5/7/2019  3:19 PM   Wound Width (cm) 0 cm 5/7/2019  3:19 PM   Wound Depth (cm) 0 cm 5/7/2019  3:19 PM   Wound Surface Area (cm^2) 0 cm^2 5/7/2019  3:19 PM   Change in Wound Size % (l*w) 100 5/7/2019  3:19 PM   Wound Volume (cm^3) 0 cm^3 5/7/2019  3:19 PM   Wound Healing % 100 5/7/2019  3:19 PM   Drainage Amount None 5/7/2019  3:19 PM   Drainage Description Serous; Yellow 3/12/2019  3:22 PM   Odor Mild 3/12/2019  3:22 PM   Margins Undefined edges 3/12/2019  3:22 PM   Radha-wound Assessment Intact;Fragile 3/12/2019  3:22 PM   Non-staged Wound Description Full thickness 3/12/2019  3:22 PM   [place]%Wound Bed 20 12/18/2018  3:22 PM   Red%Wound Bed 100 3/12/2019  3:22 PM   Yellow%Wound Bed 60 1/15/2019  1:22 PM   Number of days: 139       Wound 12/18/18 Leg Anterior; Left #7 (Active)   Wound Image   5/7/2019  3:19 PM   Wound Venous 5/7/2019  3:19 PM   Wound Cleansed Rinsed/Irrigated with saline 5/7/2019  3:19 PM   Wound Length (cm) 0 cm 5/7/2019  3:19 PM   Wound Width (cm) 0 cm Physician Orders and Discharge [facility]  [address]   [facility], [address]  Telephone: [phone]    NAME:  Edie Edward  YOB: 1970  MEDICAL RECORD NUMBER:  [id number]  DATE:  5/7/2019    Congratulations!! You have completed your treatment. 1. Return to your Primary Care Physician for all your health issues. 2. Resume your ordinary activities as tolerated. 3. Take your medications as prescribed by your primary care physician. 4. Check your skin daily for cracks, bruises, sores, or dryness. Use a moisturizer as needed. 5. Clean and dry your skin, using mild soap and warm water (not hot). 6. Avoid alcohol and caffeine and do not smoke. 7. Maintain a nutritious diet. 8. Avoid pressure on your wound site. Keep your legs elevated above the level of the heart whenever possible. 9. Continue to use wraps/stockings/compression as prescribed. 10. Replace compression stockings every four to six months as needed to ensure proper fit. 11. Wear well-fitting shoes and leg garments. 12. Apply 10-20MMhG compression hose to  BILATERAL lower leg(s), may remove at bedtime, reapply first thing in the morning, avoid prolonged standing, elevate legs when sitting. 13. USE LYMPHEDEMA PUMPS TO BILATERAL LEGS ONE TIME A DAY - DISINFECT BOOTS BEFORE USING. 14. DR. Hiren Mcfadden TO REFER PATIENT TO THE LYMPHEDEMA CLINIC.   [phone]      THANK YOU FOR ALLOWING US TO SERVE YOU.  PLEASE CALL IF YOU DEVELOP ANOTHER WOUND. [phone]                       Electronically signed by Nikki Garces DPM on 5/7/2019 at 4:46 PM

## 2019-05-21 ENCOUNTER — HOSPITAL ENCOUNTER (OUTPATIENT)
Dept: PHYSICAL THERAPY | Age: 49
Setting detail: THERAPIES SERIES
Discharge: HOME | End: 2019-05-21
Payer: COMMERCIAL

## 2019-05-21 NOTE — PROGRESS NOTES
[facility]    [x] [address]  [] [facility]     Physical Therapy  Cancellation/No-show Note  Patient Name:  Wei Marcelo  :  1970   Date:  2019  Referring Practitioner: Dr. Lebron Randolph   Diagnosis: mobid obesity, venous insufficiency of both LEs, lymphedema     Visit Information:  PT Visit Information  Onset Date: (15 mos ago )  PT Insurance Information: [facility] Medicaid   Total # of Visits Approved: 29  Total # of Visits to Date: 1  No Show: 0  Canceled Appointment: 0    For today's appointment patient:  [x]  Cancelled  []  Rescheduled appointment  []  No-show   []  Called pt to remind of next appointment     Reason given by patient:  []  Patient ill  []  Conflicting appointment  []  No transportation    []  Conflict with work  []  No reason given  []  Inclement weather   []  Other:       Comments:   Pt present for outpatient PT evaluation for lymphedema. However, pt advised receiving [facility] PT and nursing. Discussed concerns of insurance coverage. Advised pt to discuss with [facility] agency whether lymphedema therapy available, as pt unable to receive [facility] PT and outpatient PT simultaneously. Pt also to check if able to receive [address] with OP PT. Pt given written handouts for lymphedema education. Advised to resume using lymphedema compression pumps. Will await response from pt after checking with [facility] agency to ensure coverage.       Signature: Electronically signed by Poncho Salgado PT on 19 at 2:37 PM

## 2019-06-11 ENCOUNTER — HOSPITAL ENCOUNTER (OUTPATIENT)
Dept: PHYSICAL THERAPY | Age: 49
Setting detail: THERAPIES SERIES
Discharge: HOME | End: 2019-06-11
Payer: COMMERCIAL

## 2019-06-11 PROCEDURE — 97110 THERAPEUTIC EXERCISES: CPT

## 2019-06-11 PROCEDURE — 97163 PT EVAL HIGH COMPLEX 45 MIN: CPT

## 2019-06-11 ASSESSMENT — PAIN DESCRIPTION - PAIN TYPE: TYPE: CHRONIC PAIN

## 2019-06-11 ASSESSMENT — PAIN - FUNCTIONAL ASSESSMENT: PAIN_FUNCTIONAL_ASSESSMENT: PREVENTS OR INTERFERES SOME ACTIVE ACTIVITIES AND ADLS

## 2019-06-11 ASSESSMENT — PAIN SCALES - GENERAL: PAINLEVEL_OUTOF10: 6

## 2019-06-11 ASSESSMENT — PAIN DESCRIPTION - FREQUENCY: FREQUENCY: CONTINUOUS

## 2019-06-11 ASSESSMENT — PAIN DESCRIPTION - LOCATION: LOCATION: KNEE

## 2019-06-11 ASSESSMENT — PAIN DESCRIPTION - ORIENTATION: ORIENTATION: LEFT

## 2019-06-11 ASSESSMENT — PAIN DESCRIPTION - DESCRIPTORS: DESCRIPTORS: SHARP

## 2019-06-11 NOTE — PLAN OF CARE
[address]     Ph: [phone]  Fax: [phone]    [] Certification  [] Recertification [x]  Plan of Care  [] Progress Note [] Discharge      To:  Dr. Tatianna Mike        From:  Jose Hernandez, PT  Patient: Jered Bradford        : 1970  Diagnosis: mobid obesity, venous insufficiency of both LEs, lymphedema      Date: 2019  Treatment Diagnosis: LE lymphedema, difficulty with gait, impaired mobility, impaired ADLs      Progress Report Period from:  2019  to 2019    Total # of Visits to Date: 1   No Show: 0    Canceled Appointment: 0     OBJECTIVE:   Short Term Goals - Time Frame for Short term goals: 2 wks     Goals Current/Discharge status  Met   Short term goal 1: Independent with HEP to improve circulation and decrease LE girth   Need for HEP  [] yes  [] no     Long Term Goals - Time Frame for Long term goals : 6 wks   Goals Current/ Discharge status Met   Long term goal 1: Improve LE strength >/= 4+/5 to improve ease with transfers  Strength RLE  Strength RLE: Exception  R Hip Flexion: 4-/5  R Knee Flexion: 4/5  R Knee Extension: 4+/5  R Ankle Dorsiflexion: 4+/5  R Ankle Plantar flexion: 4+/5  R Ankle Inversion: 4+/5  R Ankle Eversion: 4+/5  Strength LLE  Strength LLE: Exception  Comment: increased pain with resistance   L Hip Flexion: 4-/5  L Knee Flexion: 3+/5  L Knee Extension: 4-/5  L Ankle Dorsiflexion: 4+/5  L Ankle Plantar Flexion: 4+/5  L Ankle Inversion: 4+/5  L Ankle Eversion: 4+/5     [] yes  [] no   Long term goal 2: decrease girth bilateral LEs 2-5 cm to improve ease with dressing and ADLs  Sig girth bilateral LEs [] yes  [] no   Long term goal 3: Improve activity tolerance to 60 minutes without rest breaks  Pt requires rest breaks and reports fatigue after eval and 15 minutes of AROM  [] yes  [] no   Long term goal 4: LLIS </= 40 to demonstrate decreased impact of

## 2019-06-11 NOTE — PROGRESS NOTES
[address] [facility]    [x]  [address]  []  [facility]        of [address]      Date: 2019  Patient: Jeffery Mcallister  : 1970  ACCT #: [de-identified]  Referring physician: Referring Practitioner: Dr. Duy Irwin     Referring Practitioner: Dr. Duy Irwin     Referral Date : 19    Diagnosis: mobid obesity, venous insufficiency of both LEs, lymphedema     Treatment Diagnosis: LE lymphedema, difficulty with gait, impaired mobility, impaired ADLs   Onset Date: (15 mos ago )  PT Insurance Information: [employer]   Total # of Visits Approved: 29   Total # of Visits to Date: 1  No Show: 0  Canceled Appointment: 0    History   has a past medical history of Morbid obesity ([address] Overactive bladder, and Psychiatric problem. has a past surgical history that includes back surgery; Cholecystectomy; and Abdomen surgery. Subjective  Subjective: Pt reports onset of cellulitis 15 mos ago with chronic open wound. Pt reports she fell in shower prior to cellulitis. pt reports in December she had weeping in her LEs with \"sores in her pus\". Delayed medical care due to lack of insurance. Pt then admitted with cellulitis with open wounds. Pt has had IV antibiotcs, multiple hospital admissions, rehab at [facility]. Recently finished wound care. Pt reports Arvilla Pries is biggest concern. Pt does have intermittent compression pumps, but has not been using due to increased pain when had open wound. Pt has been discharged from [place] [address]. Pt reports has not started lymphedema pumps due to inconveniently placed in home. Will consider rearranging room to improve compliance with this. Discussed aquatic therapy to improve overall mobility. Educated pt on lymphedema and treatment options.     Additional Pertinent Hx: gastric bypass, morbid obesity, open wound   Pain Screening  Patient Currently in Pain: Yes  Pain Assessment  Pain Assessment: 0-10  Pain Level: 6(Range: 4-7/10)  Pain Type: Chronic pain  Pain Location: Knee  Pain Orientation: Left  Pain Descriptors: Sharp(grinding)  Pain Frequency: Continuous  Functional Pain Assessment: Prevents or interferes some active activities and ADLs    Social/Functional History  Lives With: Spouse  Type of Home: House  Home Layout: One level  Home Access: Stairs to enter with rails  Entrance Stairs - Number of Steps: 1  Bathroom Equipment: Toilet raiser, Shower chair, Commode  Home Equipment: Fibichova 450 bed(rollator, risers for couch)  Receives Help From: Family  ADL Assistance: (occ assistance, usually independent with equipment )  Homemaking Assistance: Needs assistance  Ambulation Assistance: Independent  Transfer Assistance: Independent  Active : No  Patient's  Info: Pt has license but spouse has been doing the driving, has not driven in over a year  Mode of Transportation: Car  Occupation: On disability  Type of occupation: on disability 2/19, 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  Cellulitis): [x]No     []Yes:  Comment(s): pt with h/o cellulitis with multiple hospitalizations     History of Cancer:       No [x]      Yes []      Previous Lymphedema Treatment:   [] no  [x] yes:  [x]compressive bandaging with ACE wraps [] lymphatic drainage massage  [x] pump    results: unable to tolerate pumps at time, plans to resume     OBJECTIVE     AROM RLE (degrees)  RLE General AROM: LE ROM limitations due to excess soft tissue      AROM LLE (degrees)  LLE General AROM: LE ROM limitations due to excess soft tissue                    Strength RLE  Strength RLE: Exception  R Hip Flexion: 4-/5  R Knee Flexion: 4/5  R Knee Extension: 4+/5  R Ankle Dorsiflexion: 4+/5  R Ankle Plantar flexion: 4+/5  R Ankle Inversion: 4+/5  R Ankle Eversion: 4+/5  Strength LLE  Strength LLE: Exception  Comment: increased pain with resistance   L Hip Flexion: 4-/5  L Knee Flexion: 3+/5  L Knee Extension: 4-/5  L Ankle Dorsiflexion: 4+/5  L Ankle Plantar Flexion: 4+/5  L Ankle Inversion: 4+/5  L Ankle Eversion: 4+/5             Bed mobility  Rolling to Left: Independent  Rolling to Right: Independent  Supine to Sit: Independent  Sit to Supine: Independent  Comment: increased time and effort to complete      Transfers  Sit to Stand: Independent  Stand to sit:  Independent  Comment: with UE use   Ambulation 1  Surface: carpet  Device: Rollator  Assistance: Independent  Quality of Gait: Pt ambulates with increased effort, increased frontal plane motion   Gait Deviations: Increased REEMA, Slow Laura, Decreased step length, Decreased step height     Ambulation  Ambulation?: Yes  Stairs  # Steps : 4  Stairs Height: 6\"        Pitting Edema []None []Slight(+1)     []Moderate(+2)     []Severe(>+3)   Comment(s):       Skin Appearance/           Condition []Normal []Mild redness    [x]Moderate redness     []Mottled    []Rough     []Dry      []Flaky      []Leathery        Open Wound(s) [x]None Location(s)     Description(s):    Tissue Temperature [x]Normal []Cool     []Warm     []Hot     []Uneven   Skin Folds []None []Small     [x]Medium     []Large  Location:   Fibrotic Tissue []None [x]  Minimal     Moderate     Severe/Hard     Orange Peel Texture [x]None []Mild     []Moderate     []Severe  Location:    Nailbed Appearance/                 Condition []Normal Description(s): thickened and discolored   Leakage of Fluid [x]None Amount: []Small     []Moderate     []Large  Description of fluid: []Clear     []Cloudy       []Other   Other []NA        Circumferential Measurements:  []Measurements to be taken during the initial treatment session. Measurements [] Upper Extremity  [] Lower Extremity      Location Right (cm)   6 Left (cm)  6   1st DIP  8 cm 8   10 cm from big toe  25 cm 27.5   6 cm from heel 32 34.5   10 36 36   20 43.5 52   30 55.5 60   40 60.5 64   50 62.5 68     Lymphedema Life Impact Scale: 54    Exercises:   Exercises  Exercise 1: bilateral LE lymphedema exercises x5-10 ea  Exercise 2: ** self massage   Exercise 5: Aquatics:   Exercise 6: ** sink exs   Exercise 7: ** gait drills   Exercise 8: ** step ups   Exercise 9: ** push pull   Exercise 10: ** trunk rotation   Exercise 20: HEP: lymphedema exercises     TREATMENT  []No treatment was initiated during this evaluation. Treatment will begin during the next    treatment session. [x]Yes treatment was initiated during this evaluation and included the following:   []Lymphatic Drainage Massage   []Skin Care     []Compression Bandaging     [x]Exercises     []Other:     Treatment tolerance: Patient tolerance to treatment:  [x]Good     []Fair     []Poor  Limitations due to:     [x]Pain     [x]Fatigue     []Other medical complications     []Other:    Post-Pain:  Pain Ratin/10  Location and Pain Description same as pre-pain unless otherwise indicated. Action taken: [] NA  [x] Call Physician  [x] Perform HEP  [x] Meds as prescribed       ASSESSMENT/PLAN:  Assessment   Conditions Requiring Skilled Therapeutic Intervention  Body structures, Functions, Activity limitations: Decreased functional mobility , Decreased ROM, Decreased ADL status, Decreased strength, Decreased endurance, Increased Pain  Assessment: Pt presents with bilateral LE lymphedema with h/o cellulitis and open wounds. Pt with limited mobility due to size and pain Lt knee. Pt would benefit from skilled PT to address deficits and maximize functional mobility with minimal pain. Will consider aquatic therapy with pt to improve tolerance to activity.    Treatment Diagnosis:

## 2019-06-13 ENCOUNTER — HOSPITAL ENCOUNTER (OUTPATIENT)
Dept: PHYSICAL THERAPY | Age: 49
Setting detail: THERAPIES SERIES
Discharge: HOME | End: 2019-06-13
Payer: COMMERCIAL

## 2019-06-13 NOTE — PROGRESS NOTES
[address]       Physical Therapy  Cancellation/No-show Note  Patient Name:  Jeffery Mcallister  :  1970   Date:  2019  Referring Practitioner: Dr. Evelio Randolph   Diagnosis: mobid obesity, venous insufficiency of both LEs, lymphedema     Visit Information:  PT Visit Information  Onset Date: (15 mos ago )  PT Insurance Information: [facility] Medicaid   Total # of Visits Approved: 29  Total # of Visits to Date: 1  No Show: 0  Canceled Appointment: 1  Progress Note Counter: 1/10 (cx on 19)    For today's appointment patient:  [x]  Cancelled  []  Rescheduled appointment  []  No-show   []  Called pt to remind of next appointment     Reason given by patient:  [x]  Patient ill  []  Conflicting appointment  []  No transportation    []  Conflict with work  []  No reason given  []  Other:       Comments:       Signature: Electronically signed by Joel Barahona PTA on 19 at 9:06 AM

## 2019-06-18 ENCOUNTER — HOSPITAL ENCOUNTER (OUTPATIENT)
Dept: PHYSICAL THERAPY | Age: 49
Setting detail: THERAPIES SERIES
Discharge: HOME | End: 2019-06-18
Payer: COMMERCIAL

## 2019-06-18 NOTE — PROGRESS NOTES
[address]       Physical Therapy  Cancellation/No-show Note  Patient Name:  Amanda Jackson  :  1970   Date:  2019  Referring Practitioner: Dr. Marcia Randolph   Diagnosis: mobid obesity, venous insufficiency of both LEs, lymphedema     Visit Information:  PT Visit Information  Onset Date: (15 mos ago )  PT Insurance Information: [employer]   Total # of Visits Approved: 29  Total # of Visits to Date: 1  No Show: 0  Canceled Appointment: 2  Progress Note Counter: 1/10 (cx on 19)    For today's appointment patient:  [x]  Cancelled  []  Rescheduled appointment  []  No-show   []  Called pt to remind of next appointment     Reason given by patient:  [x]  Patient ill  []  Conflicting appointment  []  No transportation    []  Conflict with work  []  No reason given  []  Other:       Comments:       Signature: Electronically signed by Len Dior PTA on 19 at 4:04 PM

## 2019-06-20 ENCOUNTER — HOSPITAL ENCOUNTER (OUTPATIENT)
Dept: PHYSICAL THERAPY | Age: 49
Setting detail: THERAPIES SERIES
Discharge: HOME | End: 2019-06-20
Payer: COMMERCIAL

## 2019-06-20 NOTE — PROGRESS NOTES
[address]       Physical Therapy  Cancellation/No-show Note  Patient Name:  Daniel Rae  :  1970   Date:  2019  Referring Practitioner: Dr. Shira Randolph   Diagnosis: mobid obesity, venous insufficiency of both LEs, lymphedema     Visit Information:  PT Visit Information  Onset Date: (15 mos ago )  PT Insurance Information: [employer]   Total # of Visits Approved: 29  Total # of Visits to Date: 1  No Show: 0  Canceled Appointment: 3  Progress Note Counter: 1/10 cxl     For today's appointment patient:  [x]  Cancelled  []  Rescheduled appointment  []  No-show   []  Called pt to remind of next appointment     Reason given by patient:  []  Patient ill  []  Conflicting appointment  [x]  No transportation    []  Conflict with work  []  No reason given  []  Other:       Comments:       Signature: Electronically signed by Gera Fermin PTA on 19 at 3:21 PM
no

## 2019-06-27 ENCOUNTER — HOSPITAL ENCOUNTER (OUTPATIENT)
Dept: PHYSICAL THERAPY | Age: 49
Setting detail: THERAPIES SERIES
Discharge: HOME | End: 2019-06-27
Payer: COMMERCIAL

## 2019-06-27 PROCEDURE — 97110 THERAPEUTIC EXERCISES: CPT

## 2019-06-27 PROCEDURE — 97140 MANUAL THERAPY 1/> REGIONS: CPT

## 2019-06-27 ASSESSMENT — PAIN DESCRIPTION - LOCATION: LOCATION: GENERALIZED

## 2019-06-27 ASSESSMENT — PAIN DESCRIPTION - PAIN TYPE: TYPE: CHRONIC PAIN

## 2019-06-27 ASSESSMENT — PAIN DESCRIPTION - DESCRIPTORS: DESCRIPTORS: SORE;THROBBING

## 2019-06-27 ASSESSMENT — PAIN SCALES - GENERAL: PAINLEVEL_OUTOF10: 5

## 2019-06-27 NOTE — PROGRESS NOTES
[address] [address]  Outpatient Physical Therapy    Treatment Note        Date: 2019  Patient: Kimberly Mccray  : 1970  ACCT #: [de-identified]  Referring Practitioner: Dr. Jono Cote   Diagnosis: mobid obesity, venous insufficiency of both LEs, lymphedema     Visit Information:  PT Visit Information  Onset Date: (15 mos ago )  PT Insurance Information: [facility] Medicaid   Total # of Visits Approved: 29  Total # of Visits to Date: 2  No Show: 0  Canceled Appointment: 3  Progress Note Counter: 2/10     Subjective: Pt reports feeling much better after having intestinal bug 2 x /wk. Pt reports losing her exercise sheet. Pt reports wounds look healed, just dry. HEP Compliance:  [] Good [x] Fair [x] Poor [] Reports not doing due to:    Vital Signs  Patient Currently in Pain: Yes   Pain Screening  Patient Currently in Pain: Yes  Pain Assessment  Pain Level: 5  Pain Type: Chronic pain  Pain Location: Generalized  Pain Descriptors: Sore; Throbbing(knees grinding)    OBJECTIVE:   Exercises  Exercise 1: bilateral LE lymphedema exercises x10 ea  Exercise 2:  self massage : 1st 2 pages, reviewed 1 st page for HEP  *Indicates exercise, modality, or manual techniques to be initiated when appropriate    Assessment: Body structures, Functions, Activity limitations: Decreased functional mobility , Decreased ROM, Decreased ADL status, Decreased strength, Decreased endurance, Increased Pain  Assessment: Initiated lympedema tx per POC . Reviewed and performed ex's. Initiated massage with therapist performing with verbal instructions for carryover for HEP. Pt will trial 1st page for HEP. Pt with increased back pain after session d/t lying on mat despite wedge. Pt tolerated session well with increased cueing for technique and touch.   Treatment Diagnosis: LE lymphedema, difficulty with gait, impaired mobility, impaired ADLs   Prognosis: Good       Goals:  Short term goals  Time Frame for Short term goals: 2 wks

## 2019-07-02 ENCOUNTER — APPOINTMENT (OUTPATIENT)
Dept: PHYSICAL THERAPY | Age: 49
End: 2019-07-02
Payer: COMMERCIAL

## 2019-07-02 ENCOUNTER — HOSPITAL ENCOUNTER (OUTPATIENT)
Dept: PHYSICAL THERAPY | Age: 49
Setting detail: THERAPIES SERIES
Discharge: HOME | End: 2019-07-02
Payer: COMMERCIAL

## 2019-07-02 NOTE — PROGRESS NOTES
[address]       Physical Therapy  Cancellation/No-show Note  Patient Name:  Ruy Sigala  :  1970   Date:  2019  Referring Practitioner: Dr. Romell Heimlich Demou   Diagnosis: mobid obesity, venous insufficiency of both LEs, lymphedema     Visit Information:  PT Visit Information  Onset Date: (15 mos ago )  PT Insurance Information: [facility] Medicaid   Total # of Visits Approved: 29  Total # of Visits to Date: 2  No Show: 0  Canceled Appointment: 4  Progress Note Counter: 2/10 cxl 19    For today's appointment patient:  [x]  Cancelled  []  Rescheduled appointment  []  No-show   []  Called pt to remind of next appointment     Reason given by patient:  []  Patient ill  []  Conflicting appointment  []  No transportation    []  Conflict with work  []  No reason given  [x]  Other: Pt cancelled appt. On her way to therapy d/t anxiety re; traffic.  Pt rescheduled for Mon. 19      Comments:       Signature: Electronically signed by Wilberto Morgan PTA on 19 at 2:25 PM

## 2019-07-16 ENCOUNTER — HOSPITAL ENCOUNTER (OUTPATIENT)
Dept: PHYSICAL THERAPY | Age: 49
Setting detail: THERAPIES SERIES
Discharge: HOME | End: 2019-07-16
Payer: COMMERCIAL

## 2019-07-16 PROCEDURE — 97110 THERAPEUTIC EXERCISES: CPT

## 2019-07-16 PROCEDURE — 97140 MANUAL THERAPY 1/> REGIONS: CPT

## 2019-07-16 ASSESSMENT — PAIN DESCRIPTION - PAIN TYPE: TYPE: CHRONIC PAIN

## 2019-07-16 ASSESSMENT — PAIN DESCRIPTION - LOCATION: LOCATION: GENERALIZED

## 2019-07-16 ASSESSMENT — PAIN DESCRIPTION - DESCRIPTORS: DESCRIPTORS: ACHING

## 2019-07-16 ASSESSMENT — PAIN SCALES - GENERAL: PAINLEVEL_OUTOF10: 6

## 2019-07-18 ENCOUNTER — HOSPITAL ENCOUNTER (OUTPATIENT)
Dept: PHYSICAL THERAPY | Age: 49
Setting detail: THERAPIES SERIES
Discharge: HOME | End: 2019-07-18
Payer: COMMERCIAL

## 2019-07-18 PROCEDURE — 97110 THERAPEUTIC EXERCISES: CPT

## 2019-07-18 PROCEDURE — 97140 MANUAL THERAPY 1/> REGIONS: CPT

## 2019-07-18 ASSESSMENT — PAIN DESCRIPTION - LOCATION: LOCATION: GENERALIZED

## 2019-07-18 ASSESSMENT — PAIN DESCRIPTION - DESCRIPTORS: DESCRIPTORS: HEAVINESS

## 2019-07-18 ASSESSMENT — PAIN SCALES - GENERAL: PAINLEVEL_OUTOF10: 7

## 2019-07-18 ASSESSMENT — PAIN DESCRIPTION - PAIN TYPE: TYPE: CHRONIC PAIN

## 2019-07-18 NOTE — PROGRESS NOTES
Minutes  Time In: 1485  Time Out: 1703  Minutes: 60   Rain 30  Manual x 30  Procedure Minutes:0    Signature:  Electronically signed by Stanford Castaneda PTA on 7/18/19 at 4:33 PM

## 2019-08-06 ENCOUNTER — HOSPITAL ENCOUNTER (OUTPATIENT)
Dept: PHYSICAL THERAPY | Age: 49
Setting detail: THERAPIES SERIES
Discharge: HOME | End: 2019-08-06
Payer: COMMERCIAL

## 2019-08-08 ENCOUNTER — HOSPITAL ENCOUNTER (OUTPATIENT)
Dept: PHYSICAL THERAPY | Age: 49
Setting detail: THERAPIES SERIES
Discharge: HOME | End: 2019-08-08
Payer: COMMERCIAL

## 2019-08-13 ENCOUNTER — HOSPITAL ENCOUNTER (OUTPATIENT)
Dept: PHYSICAL THERAPY | Age: 49
Setting detail: THERAPIES SERIES
Discharge: HOME | End: 2019-08-13
Payer: COMMERCIAL

## 2019-08-13 NOTE — PROGRESS NOTES
[address]       Physical Therapy  Cancellation/No-show Note  Patient Name:  Marley De Anda  :  1970   Date:  2019  Referring Practitioner: Dr. Jacques Randolph   Diagnosis: mobid obesity, venous insufficiency of both LEs, lymphedema     Visit Information:  PT Visit Information  PT Insurance Information: [employer]   Total # of Visits Approved: 29  Total # of Visits to Date: 4  No Show: 1  Canceled Appointment: 7  Progress Note Counter: 4/10 (cx for & 8/15/19)    For today's appointment patient:  [x]  Cancelled  []  Rescheduled appointment  []  No-show   []  Called pt to remind of next appointment     Reason given by patient:  []  Patient ill  []  Conflicting appointment  []  No transportation    []  Conflict with work  []  No reason given  [x]  Other:  Pt cxl'd for 8/15 as well d/t Rollator not being delivered until 19   Comments:       Signature: Electronically signed by Sharri Barahona PTA on 19 at 7:53 AM

## 2019-08-19 NOTE — PROGRESS NOTES
[address]       Physical Therapy  Cancellation/No-show Note  Patient Name:  Yuval Aguilar  :  1970   Date:  2019  Referring Practitioner: Dr. Silvia Randolph   Diagnosis: mobid obesity, venous insufficiency of both LEs, lymphedema     Visit Information:  PT Visit Information  PT Insurance Information: [facility] Medicaid   Total # of Visits Approved: 29  Total # of Visits to Date: 4  No Show: 1  Canceled Appointment: 10  Progress Note Counter: 4/10 (cx for ,, 19)    For today's appointment patient:  [x]  Cancelled  []  Rescheduled appointment  []  No-show   []  Called pt to remind of next appointment     Reason given by patient:  []  Patient ill  []  Conflicting appointment  []  No transportation    []  Conflict with work  []  No reason given  [x]  Other: Pt states Rollator that was delivered was the wrong size and pt will call to reschedule when appropriate AD is obtained      Comments:       Signature: Electronically signed by Megan Stack PTA on 19 at 3:39 PM

## 2019-08-20 ENCOUNTER — HOSPITAL ENCOUNTER (OUTPATIENT)
Dept: PHYSICAL THERAPY | Age: 49
Setting detail: THERAPIES SERIES
Discharge: HOME | End: 2019-08-20
Payer: COMMERCIAL

## 2019-09-20 ENCOUNTER — CLINICAL DOCUMENTATION (OUTPATIENT)
Dept: PHYSICAL THERAPY | Age: 49
End: 2019-09-20

## 2019-12-06 ENCOUNTER — HOSPITAL ENCOUNTER (EMERGENCY)
Age: 49
Discharge: ANOTHER HOSPITAL | End: 2019-12-06
Attending: INTERNAL MEDICINE
Payer: COMMERCIAL

## 2019-12-06 ENCOUNTER — HOSPITAL ENCOUNTER (INPATIENT)
Age: 49
LOS: 1 days | Discharge: ANOTHER HOSPITAL | DRG: 720 | End: 2019-12-07
Attending: INTERNAL MEDICINE | Admitting: INTERNAL MEDICINE
Payer: COMMERCIAL

## 2019-12-06 VITALS
RESPIRATION RATE: 17 BRPM | OXYGEN SATURATION: 96 % | DIASTOLIC BLOOD PRESSURE: 58 MMHG | WEIGHT: 293 LBS | BODY MASS INDEX: 50.02 KG/M2 | HEIGHT: 64 IN | SYSTOLIC BLOOD PRESSURE: 132 MMHG | HEART RATE: 113 BPM | TEMPERATURE: 98.4 F

## 2019-12-06 DIAGNOSIS — R10.9 FLANK PAIN: ICD-10-CM

## 2019-12-06 DIAGNOSIS — E86.0 DEHYDRATION: ICD-10-CM

## 2019-12-06 DIAGNOSIS — N10 ACUTE PYELONEPHRITIS: Primary | ICD-10-CM

## 2019-12-06 LAB
ALBUMIN SERPL-MCNC: 4.6 G/DL (ref 3.5–4.6)
ALP BLD-CCNC: 77 U/L (ref 40–130)
ALT SERPL-CCNC: 12 U/L (ref 0–33)
ANION GAP SERPL CALCULATED.3IONS-SCNC: 12 MEQ/L (ref 9–15)
AST SERPL-CCNC: 15 U/L (ref 0–35)
BACTERIA: NORMAL /HPF
BASOPHILS ABSOLUTE: 0 K/UL (ref 0–0.2)
BASOPHILS RELATIVE PERCENT: 0 %
BILIRUB SERPL-MCNC: 0.5 MG/DL (ref 0.2–0.7)
BILIRUBIN URINE: NEGATIVE
BLOOD, URINE: ABNORMAL
BUN BLDV-MCNC: 16 MG/DL (ref 6–20)
CALCIUM SERPL-MCNC: 10 MG/DL (ref 8.5–9.9)
CHLORIDE BLD-SCNC: 98 MEQ/L (ref 95–107)
CLARITY: CLEAR
CO2: 26 MEQ/L (ref 20–31)
COLOR: YELLOW
CREAT SERPL-MCNC: 0.77 MG/DL (ref 0.5–0.9)
EOSINOPHILS ABSOLUTE: 0 K/UL (ref 0–0.7)
EOSINOPHILS RELATIVE PERCENT: 0.2 %
EPITHELIAL CELLS, UA: NORMAL /HPF
GFR AFRICAN AMERICAN: >60
GFR NON-AFRICAN AMERICAN: >60
GLOBULIN: 4 G/DL (ref 2.3–3.5)
GLUCOSE BLD-MCNC: 140 MG/DL (ref 70–99)
GLUCOSE URINE: NEGATIVE MG/DL
HCG(URINE) PREGNANCY TEST: NEGATIVE
HCT VFR BLD CALC: 46.9 % (ref 37–47)
HEMOGLOBIN: 15.4 G/DL (ref 12–16)
KETONES, URINE: 15 MG/DL
LEUKOCYTE ESTERASE, URINE: NEGATIVE
LYMPHOCYTES ABSOLUTE: 0.8 K/UL (ref 1–4.8)
LYMPHOCYTES RELATIVE PERCENT: 5.1 %
MCH RBC QN AUTO: 28 PG (ref 27–31.3)
MCHC RBC AUTO-ENTMCNC: 32.9 % (ref 33–37)
MCV RBC AUTO: 85.1 FL (ref 82–100)
MONOCYTES ABSOLUTE: 0.1 K/UL (ref 0.2–0.8)
MONOCYTES RELATIVE PERCENT: 0.5 %
NEUTROPHILS ABSOLUTE: 14.9 K/UL (ref 1.4–6.5)
NEUTROPHILS RELATIVE PERCENT: 94.2 %
NITRITE, URINE: POSITIVE
PDW BLD-RTO: 13.3 % (ref 11.5–14.5)
PH UA: 6.5 (ref 5–9)
PLATELET # BLD: 150 K/UL (ref 130–400)
POTASSIUM SERPL-SCNC: 4.2 MEQ/L (ref 3.4–4.9)
PROTEIN UA: NEGATIVE MG/DL
RBC # BLD: 5.51 M/UL (ref 4.2–5.4)
RBC UA: NORMAL /HPF (ref 0–2)
SODIUM BLD-SCNC: 136 MEQ/L (ref 135–144)
SPECIFIC GRAVITY UA: 1.02 (ref 1–1.03)
TOTAL PROTEIN: 8.6 G/DL (ref 6.3–8)
URINE REFLEX TO CULTURE: YES
UROBILINOGEN, URINE: 1 E.U./DL
WBC # BLD: 15.8 K/UL (ref 4.8–10.8)
WBC UA: NORMAL /HPF (ref 0–5)

## 2019-12-06 PROCEDURE — 85025 COMPLETE CBC W/AUTO DIFF WBC: CPT

## 2019-12-06 PROCEDURE — 80053 COMPREHEN METABOLIC PANEL: CPT

## 2019-12-06 PROCEDURE — 6360000002 HC RX W HCPCS: Performed by: INTERNAL MEDICINE

## 2019-12-06 PROCEDURE — 36415 COLL VENOUS BLD VENIPUNCTURE: CPT

## 2019-12-06 PROCEDURE — 84703 CHORIONIC GONADOTROPIN ASSAY: CPT

## 2019-12-06 PROCEDURE — 2580000003 HC RX 258: Performed by: INTERNAL MEDICINE

## 2019-12-06 PROCEDURE — 1210000000 HC MED SURG R&B

## 2019-12-06 PROCEDURE — 81001 URINALYSIS AUTO W/SCOPE: CPT

## 2019-12-06 PROCEDURE — 99284 EMERGENCY DEPT VISIT MOD MDM: CPT

## 2019-12-06 PROCEDURE — 96365 THER/PROPH/DIAG IV INF INIT: CPT

## 2019-12-06 PROCEDURE — 96375 TX/PRO/DX INJ NEW DRUG ADDON: CPT

## 2019-12-06 PROCEDURE — 87086 URINE CULTURE/COLONY COUNT: CPT

## 2019-12-06 RX ORDER — KETOROLAC TROMETHAMINE 30 MG/ML
30 INJECTION, SOLUTION INTRAMUSCULAR; INTRAVENOUS ONCE
Status: COMPLETED | OUTPATIENT
Start: 2019-12-06 | End: 2019-12-06

## 2019-12-06 RX ORDER — ALPRAZOLAM 1 MG/1
0.5 TABLET ORAL NIGHTLY PRN
COMMUNITY
Start: 2019-11-28 | End: 2020-02-26

## 2019-12-06 RX ORDER — MORPHINE SULFATE 4 MG/ML
4 INJECTION, SOLUTION INTRAMUSCULAR; INTRAVENOUS ONCE
Status: COMPLETED | OUTPATIENT
Start: 2019-12-06 | End: 2019-12-06

## 2019-12-06 RX ORDER — LEVOFLOXACIN 5 MG/ML
500 INJECTION, SOLUTION INTRAVENOUS ONCE
Status: COMPLETED | OUTPATIENT
Start: 2019-12-06 | End: 2019-12-06

## 2019-12-06 RX ORDER — ONDANSETRON 2 MG/ML
4 INJECTION INTRAMUSCULAR; INTRAVENOUS ONCE
Status: COMPLETED | OUTPATIENT
Start: 2019-12-06 | End: 2019-12-06

## 2019-12-06 RX ORDER — 0.9 % SODIUM CHLORIDE 0.9 %
1000 INTRAVENOUS SOLUTION INTRAVENOUS ONCE
Status: DISCONTINUED | OUTPATIENT
Start: 2019-12-06 | End: 2019-12-06 | Stop reason: HOSPADM

## 2019-12-06 RX ORDER — SODIUM CHLORIDE 0.9 % (FLUSH) 0.9 %
3 SYRINGE (ML) INJECTION EVERY 8 HOURS
Status: DISCONTINUED | OUTPATIENT
Start: 2019-12-06 | End: 2019-12-06 | Stop reason: HOSPADM

## 2019-12-06 RX ORDER — 0.9 % SODIUM CHLORIDE 0.9 %
1000 INTRAVENOUS SOLUTION INTRAVENOUS ONCE
Status: COMPLETED | OUTPATIENT
Start: 2019-12-06 | End: 2019-12-06

## 2019-12-06 RX ADMIN — SODIUM CHLORIDE 1000 ML: 9 INJECTION, SOLUTION INTRAVENOUS at 19:44

## 2019-12-06 RX ADMIN — LEVOFLOXACIN 500 MG: 5 INJECTION, SOLUTION INTRAVENOUS at 21:46

## 2019-12-06 RX ADMIN — ONDANSETRON 4 MG: 2 INJECTION INTRAMUSCULAR; INTRAVENOUS at 19:44

## 2019-12-06 RX ADMIN — HYDROMORPHONE HYDROCHLORIDE 0.5 MG: 1 INJECTION, SOLUTION INTRAMUSCULAR; INTRAVENOUS; SUBCUTANEOUS at 21:46

## 2019-12-06 RX ADMIN — MORPHINE SULFATE 4 MG: 4 INJECTION, SOLUTION INTRAMUSCULAR; INTRAVENOUS at 20:55

## 2019-12-06 RX ADMIN — Medication 3 ML: at 19:45

## 2019-12-06 RX ADMIN — KETOROLAC TROMETHAMINE 30 MG: 30 INJECTION, SOLUTION INTRAMUSCULAR at 19:44

## 2019-12-06 ASSESSMENT — PAIN DESCRIPTION - ORIENTATION: ORIENTATION: LEFT

## 2019-12-06 ASSESSMENT — PAIN DESCRIPTION - DESCRIPTORS: DESCRIPTORS: SHOOTING;SHARP

## 2019-12-06 ASSESSMENT — PAIN DESCRIPTION - FREQUENCY: FREQUENCY: CONTINUOUS

## 2019-12-06 ASSESSMENT — PAIN SCALES - GENERAL: PAINLEVEL_OUTOF10: 10

## 2019-12-06 ASSESSMENT — PAIN DESCRIPTION - LOCATION: LOCATION: FLANK

## 2019-12-06 ASSESSMENT — PAIN DESCRIPTION - PAIN TYPE: TYPE: ACUTE PAIN

## 2019-12-07 ENCOUNTER — APPOINTMENT (OUTPATIENT)
Dept: CT IMAGING | Age: 49
DRG: 720 | End: 2019-12-07
Attending: INTERNAL MEDICINE
Payer: COMMERCIAL

## 2019-12-07 VITALS
TEMPERATURE: 98.4 F | WEIGHT: 293 LBS | RESPIRATION RATE: 18 BRPM | OXYGEN SATURATION: 93 % | SYSTOLIC BLOOD PRESSURE: 128 MMHG | BODY MASS INDEX: 50.02 KG/M2 | HEIGHT: 64 IN | HEART RATE: 108 BPM | DIASTOLIC BLOOD PRESSURE: 76 MMHG

## 2019-12-07 PROBLEM — N39.0 SEPSIS DUE TO URINARY TRACT INFECTION (HCC): Status: ACTIVE | Noted: 2019-12-07

## 2019-12-07 PROBLEM — A41.9 SEPSIS DUE TO URINARY TRACT INFECTION (HCC): Status: ACTIVE | Noted: 2019-12-07

## 2019-12-07 LAB
ANION GAP SERPL CALCULATED.3IONS-SCNC: 13 MEQ/L (ref 9–15)
BACTERIA: ABNORMAL /HPF
BILIRUBIN URINE: ABNORMAL
BLOOD, URINE: ABNORMAL
BUN BLDV-MCNC: 20 MG/DL (ref 6–20)
CALCIUM SERPL-MCNC: 8.5 MG/DL (ref 8.5–9.9)
CHLORIDE BLD-SCNC: 102 MEQ/L (ref 95–107)
CLARITY: CLEAR
CO2: 22 MEQ/L (ref 20–31)
COLOR: ABNORMAL
CREAT SERPL-MCNC: 1.05 MG/DL (ref 0.5–0.9)
EPITHELIAL CELLS, UA: ABNORMAL /HPF (ref 0–5)
GFR AFRICAN AMERICAN: >60
GFR NON-AFRICAN AMERICAN: 55.8
GLUCOSE BLD-MCNC: 108 MG/DL (ref 70–99)
GLUCOSE URINE: NEGATIVE MG/DL
HCT VFR BLD CALC: 40.6 % (ref 37–47)
HEMOGLOBIN: 13.3 G/DL (ref 12–16)
HYALINE CASTS: ABNORMAL /HPF (ref 0–5)
KETONES, URINE: NEGATIVE MG/DL
LACTIC ACID, SEPSIS: 2.3 MMOL/L (ref 0.5–1.9)
LACTIC ACID, SEPSIS: 2.8 MMOL/L (ref 0.5–1.9)
LACTIC ACID, SEPSIS: 2.8 MMOL/L (ref 0.5–1.9)
LACTIC ACID, SEPSIS: 3.1 MMOL/L (ref 0.5–1.9)
LEUKOCYTE ESTERASE, URINE: ABNORMAL
MCH RBC QN AUTO: 28 PG (ref 27–31.3)
MCHC RBC AUTO-ENTMCNC: 32.9 % (ref 33–37)
MCV RBC AUTO: 85.3 FL (ref 82–100)
NITRITE, URINE: POSITIVE
PDW BLD-RTO: 13.7 % (ref 11.5–14.5)
PH UA: 5 (ref 5–9)
PLATELET # BLD: 123 K/UL (ref 130–400)
POTASSIUM REFLEX MAGNESIUM: 4.2 MEQ/L (ref 3.4–4.9)
PROTEIN UA: ABNORMAL MG/DL
RBC # BLD: 4.76 M/UL (ref 4.2–5.4)
RBC UA: ABNORMAL /HPF (ref 0–2)
SODIUM BLD-SCNC: 137 MEQ/L (ref 135–144)
SPECIFIC GRAVITY UA: 1.08 (ref 1–1.03)
UROBILINOGEN, URINE: 1 E.U./DL
WBC # BLD: 15.3 K/UL (ref 4.8–10.8)
WBC UA: ABNORMAL /HPF (ref 0–5)

## 2019-12-07 PROCEDURE — 74177 CT ABD & PELVIS W/CONTRAST: CPT

## 2019-12-07 PROCEDURE — 6360000004 HC RX CONTRAST MEDICATION: Performed by: INTERNAL MEDICINE

## 2019-12-07 PROCEDURE — 87040 BLOOD CULTURE FOR BACTERIA: CPT

## 2019-12-07 PROCEDURE — 81001 URINALYSIS AUTO W/SCOPE: CPT

## 2019-12-07 PROCEDURE — 87077 CULTURE AEROBIC IDENTIFY: CPT

## 2019-12-07 PROCEDURE — 87149 DNA/RNA DIRECT PROBE: CPT

## 2019-12-07 PROCEDURE — 87186 SC STD MICRODIL/AGAR DIL: CPT

## 2019-12-07 PROCEDURE — 36415 COLL VENOUS BLD VENIPUNCTURE: CPT

## 2019-12-07 PROCEDURE — 80048 BASIC METABOLIC PNL TOTAL CA: CPT

## 2019-12-07 PROCEDURE — 83605 ASSAY OF LACTIC ACID: CPT

## 2019-12-07 PROCEDURE — 6370000000 HC RX 637 (ALT 250 FOR IP): Performed by: INTERNAL MEDICINE

## 2019-12-07 PROCEDURE — 85027 COMPLETE CBC AUTOMATED: CPT

## 2019-12-07 PROCEDURE — 6360000002 HC RX W HCPCS: Performed by: INTERNAL MEDICINE

## 2019-12-07 PROCEDURE — 2580000003 HC RX 258: Performed by: INTERNAL MEDICINE

## 2019-12-07 RX ORDER — SODIUM CHLORIDE 9 MG/ML
INJECTION, SOLUTION INTRAVENOUS CONTINUOUS
Status: DISCONTINUED | OUTPATIENT
Start: 2019-12-07 | End: 2019-12-07 | Stop reason: HOSPADM

## 2019-12-07 RX ORDER — KETOROLAC TROMETHAMINE 30 MG/ML
30 INJECTION, SOLUTION INTRAMUSCULAR; INTRAVENOUS EVERY 6 HOURS PRN
Status: DISCONTINUED | OUTPATIENT
Start: 2019-12-07 | End: 2019-12-07 | Stop reason: HOSPADM

## 2019-12-07 RX ORDER — CITALOPRAM 20 MG/1
20 TABLET ORAL DAILY
COMMUNITY

## 2019-12-07 RX ORDER — ALPRAZOLAM 0.5 MG/1
0.5 TABLET ORAL NIGHTLY PRN
Status: DISCONTINUED | OUTPATIENT
Start: 2019-12-07 | End: 2019-12-07 | Stop reason: HOSPADM

## 2019-12-07 RX ORDER — PANTOPRAZOLE SODIUM 40 MG/1
40 TABLET, DELAYED RELEASE ORAL
Status: DISCONTINUED | OUTPATIENT
Start: 2019-12-07 | End: 2019-12-07 | Stop reason: HOSPADM

## 2019-12-07 RX ORDER — OXYCODONE HYDROCHLORIDE 5 MG/1
5 TABLET ORAL EVERY 4 HOURS PRN
Status: DISCONTINUED | OUTPATIENT
Start: 2019-12-07 | End: 2019-12-07 | Stop reason: HOSPADM

## 2019-12-07 RX ORDER — BUPROPION HYDROCHLORIDE 300 MG/1
300 TABLET ORAL DAILY
Status: DISCONTINUED | OUTPATIENT
Start: 2019-12-07 | End: 2019-12-07 | Stop reason: HOSPADM

## 2019-12-07 RX ORDER — TOPIRAMATE 25 MG/1
25 TABLET ORAL DAILY
Status: DISCONTINUED | OUTPATIENT
Start: 2019-12-07 | End: 2019-12-07 | Stop reason: HOSPADM

## 2019-12-07 RX ORDER — ESCITALOPRAM OXALATE 20 MG/1
20 TABLET ORAL DAILY
Status: DISCONTINUED | OUTPATIENT
Start: 2019-12-07 | End: 2019-12-07 | Stop reason: HOSPADM

## 2019-12-07 RX ORDER — OXYBUTYNIN CHLORIDE 5 MG/1
5 TABLET ORAL 2 TIMES DAILY
Status: DISCONTINUED | OUTPATIENT
Start: 2019-12-07 | End: 2019-12-07 | Stop reason: HOSPADM

## 2019-12-07 RX ORDER — LEVOFLOXACIN 5 MG/ML
750 INJECTION, SOLUTION INTRAVENOUS EVERY 24 HOURS
Status: DISCONTINUED | OUTPATIENT
Start: 2019-12-07 | End: 2019-12-07 | Stop reason: HOSPADM

## 2019-12-07 RX ORDER — M-VIT,TX,IRON,MINS/CALC/FOLIC 27MG-0.4MG
1 TABLET ORAL DAILY
COMMUNITY

## 2019-12-07 RX ORDER — SODIUM CHLORIDE 0.9 % (FLUSH) 0.9 %
10 SYRINGE (ML) INJECTION PRN
Status: DISCONTINUED | OUTPATIENT
Start: 2019-12-07 | End: 2019-12-07 | Stop reason: HOSPADM

## 2019-12-07 RX ORDER — SODIUM CHLORIDE 0.9 % (FLUSH) 0.9 %
10 SYRINGE (ML) INJECTION EVERY 12 HOURS SCHEDULED
Status: DISCONTINUED | OUTPATIENT
Start: 2019-12-07 | End: 2019-12-07 | Stop reason: HOSPADM

## 2019-12-07 RX ORDER — ONDANSETRON 2 MG/ML
4 INJECTION INTRAMUSCULAR; INTRAVENOUS EVERY 6 HOURS PRN
Status: DISCONTINUED | OUTPATIENT
Start: 2019-12-07 | End: 2019-12-07 | Stop reason: HOSPADM

## 2019-12-07 RX ORDER — ACETAMINOPHEN 325 MG/1
650 TABLET ORAL EVERY 4 HOURS PRN
Status: DISCONTINUED | OUTPATIENT
Start: 2019-12-07 | End: 2019-12-07 | Stop reason: HOSPADM

## 2019-12-07 RX ORDER — ACETAMINOPHEN 160 MG
2000 TABLET,DISINTEGRATING ORAL DAILY
COMMUNITY

## 2019-12-07 RX ORDER — FERROUS SULFATE 325(65) MG
325 TABLET ORAL
COMMUNITY

## 2019-12-07 RX ADMIN — OXYBUTYNIN CHLORIDE 5 MG: 5 TABLET ORAL at 10:07

## 2019-12-07 RX ADMIN — ENOXAPARIN SODIUM 40 MG: 40 INJECTION SUBCUTANEOUS at 10:06

## 2019-12-07 RX ADMIN — OXYCODONE HYDROCHLORIDE 5 MG: 5 TABLET ORAL at 16:02

## 2019-12-07 RX ADMIN — BUPROPION HYDROCHLORIDE 300 MG: 300 TABLET, FILM COATED, EXTENDED RELEASE ORAL at 10:07

## 2019-12-07 RX ADMIN — OXYBUTYNIN CHLORIDE 5 MG: 5 TABLET ORAL at 01:09

## 2019-12-07 RX ADMIN — SODIUM CHLORIDE: 9 INJECTION, SOLUTION INTRAVENOUS at 05:50

## 2019-12-07 RX ADMIN — KETOROLAC TROMETHAMINE 30 MG: 30 INJECTION, SOLUTION INTRAMUSCULAR at 01:09

## 2019-12-07 RX ADMIN — SODIUM CHLORIDE: 9 INJECTION, SOLUTION INTRAVENOUS at 01:09

## 2019-12-07 RX ADMIN — OXYCODONE HYDROCHLORIDE 5 MG: 5 TABLET ORAL at 06:48

## 2019-12-07 RX ADMIN — IOPAMIDOL 100 ML: 755 INJECTION, SOLUTION INTRAVENOUS at 08:30

## 2019-12-07 RX ADMIN — OXYCODONE HYDROCHLORIDE 5 MG: 5 TABLET ORAL at 02:40

## 2019-12-07 RX ADMIN — ESCITALOPRAM OXALATE 20 MG: 20 TABLET ORAL at 10:07

## 2019-12-07 RX ADMIN — OXYCODONE HYDROCHLORIDE 5 MG: 5 TABLET ORAL at 10:58

## 2019-12-07 RX ADMIN — TOPIRAMATE 25 MG: 25 TABLET, FILM COATED ORAL at 10:07

## 2019-12-07 RX ADMIN — SODIUM CHLORIDE: 9 INJECTION, SOLUTION INTRAVENOUS at 13:57

## 2019-12-07 RX ADMIN — PANTOPRAZOLE SODIUM 40 MG: 40 TABLET, DELAYED RELEASE ORAL at 05:50

## 2019-12-07 ASSESSMENT — ENCOUNTER SYMPTOMS: SHORTNESS OF BREATH: 0

## 2019-12-07 ASSESSMENT — PAIN SCALES - GENERAL
PAINLEVEL_OUTOF10: 10
PAINLEVEL_OUTOF10: 7
PAINLEVEL_OUTOF10: 4
PAINLEVEL_OUTOF10: 7
PAINLEVEL_OUTOF10: 8
PAINLEVEL_OUTOF10: 6
PAINLEVEL_OUTOF10: 5

## 2019-12-09 LAB — URINE CULTURE, ROUTINE: NORMAL

## 2019-12-10 LAB
BLOOD CULTURE, ROUTINE: ABNORMAL
CULTURE, BLOOD 2: ABNORMAL
CULTURE, BLOOD 2: ABNORMAL
ORGANISM: ABNORMAL

## 2020-12-09 NOTE — PLAN OF CARE
Problem: Wound:  Goal: Will show signs of wound healing; wound closure and no evidence of infection  Will show signs of wound healing; wound closure and no evidence of infection   Outcome: Ongoing  See flowsheet Subjective     Isaiah Barnes is a 25 year old female who presents to clinic today for the following health issues:    HPI         Diabetes Follow-up    How often are you checking your blood sugar? Four or more times daily  Blood sugar testing frequency justification:  None  What time of day are you checking your blood sugars (select all that apply)?  Before and after meals and At bedtime  Have you had any blood sugars above 200?  No, not frequently   Have you had any blood sugars below 70?  No    What symptoms do you notice when your blood sugar is low?  Shaky and Dizzy    What concerns do you have today about your diabetes? None     Do you have any of these symptoms? (Select all that apply)  No numbness or tingling in feet.  No redness, sores or blisters on feet.  No complaints of excessive thirst.  No reports of blurry vision.  No significant changes to weight.    Have you had a diabetic eye exam in the last 12 months? No- has an appointment in Jan.2021    Just started Tresiba (at 19 units) at suggestion of endocrinology - not noticing a ton of difference, but did have a glucose of upper 300's for no identifiable reasons.  Mealtime novolog has not changed.  Endocrinology visit scheduled for Dec 15th.  One low last night, had fruit snacks for hypoglycemia and pulled out crown  Does not typically go low - feels shakey when low    Continuing on JUNITO - periods normal.  Would like to continue.    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  2-3    On average, how many sweetened beverages do you drink each day (Examples: soda, juice, sweet tea, etc.  Do NOT count diet or artificially sweetened beverages)?   0    How many days per week do you exercise enough to make your heart beat faster? 3 or less    How many minutes a day do you exercise enough to make your heart beat faster? 30 - 60    How many days per week do you miss taking your medication? 0      Review of Systems   Constitutional, HEENT, cardiovascular, pulmonary, gi and gu systems are negative, except as otherwise noted.      Objective    /84   Pulse 90   Temp 98.9  F (37.2  C) (Temporal)   Resp 16   Ht 1.635 m (5' 4.37\")   Wt 73.9 kg (163 lb)   SpO2 97%   BMI 27.66 kg/m    Body mass index is 27.66 kg/m .  Physical Exam   GENERAL: healthy, alert and no distress  EYES: Eyes grossly normal to inspection, PERRL and conjunctivae and sclerae normal  HENT: ear canals and TM's normal, nose and mouth deferred due to covid  NECK: no adenopathy, no asymmetry, masses, or scars and thyroid normal to palpation  RESP: lungs clear to auscultation - no rales, rhonchi or wheezes  CV: regular rate and rhythm, normal S1 S2, no S3 or S4, no murmur, click or rub, no peripheral edema and peripheral pulses strong  ABDOMEN: soft, nontender, no hepatosplenomegaly, no masses and bowel sounds normal  MS: no gross musculoskeletal defects noted, no edema  SKIN: no suspicious lesions or rashes  NEURO: Normal strength and tone, mentation intact and speech normal  PSYCH: mentation appears normal, affect normal/bright    Results for orders placed or performed in visit on 12/10/20 (from the past 24 hour(s))   Comprehensive metabolic panel (BMP + Alb, Alk Phos, ALT, AST, Total. Bili, TP)   Result Value Ref Range    Sodium 136 133 - 144 mmol/L    Potassium 4.0 3.4 - 5.3 mmol/L    Chloride 105 94 - 109 mmol/L    Carbon Dioxide 25 20 - 32 mmol/L    " Anion Gap 6 3 - 14 mmol/L    Glucose 210 (H) 70 - 99 mg/dL    Urea Nitrogen 10 7 - 30 mg/dL    Creatinine 0.78 0.52 - 1.04 mg/dL    GFR Estimate >90 >60 mL/min/[1.73_m2]    GFR Estimate If Black >90 >60 mL/min/[1.73_m2]    Calcium 9.2 8.5 - 10.1 mg/dL    Bilirubin Total 0.3 0.2 - 1.3 mg/dL    Albumin 3.9 3.4 - 5.0 g/dL    Protein Total 7.4 6.8 - 8.8 g/dL    Alkaline Phosphatase 93 40 - 150 U/L    ALT 16 0 - 50 U/L    AST 11 0 - 45 U/L   TSH with free T4 reflex   Result Value Ref Range    TSH 0.68 0.40 - 4.00 mU/L   Hemoglobin A1c   Result Value Ref Range    Hemoglobin A1C 8.0 (H) 0 - 5.6 %   Lipid panel reflex to direct LDL Fasting   Result Value Ref Range    Cholesterol 155 <200 mg/dL    Triglycerides 90 <150 mg/dL    HDL Cholesterol 58 >49 mg/dL    LDL Cholesterol Calculated 79 <100 mg/dL    Non HDL Cholesterol 97 <130 mg/dL   Albumin Random Urine Quantitative with Creat Ratio   Result Value Ref Range    Creatinine Urine 120 mg/dL    Albumin Urine mg/L 6 mg/L    Albumin Urine mg/g Cr 5.02 0 - 25 mg/g Cr           Assessment & Plan     (E10.65) Type 1 diabetes mellitus with hyperglycemia (H)  (primary encounter diagnosis)  Comment:   Plan: blood glucose (NO BRAND SPECIFIED) test strip,         blood glucose (NO BRAND SPECIFIED) lancets         standard, CANCELED: TSH with free T4 reflex,         CANCELED: Comprehensive metabolic panel,         CANCELED: Lipid panel reflex to direct LDL         Fasting, CANCELED: Albumin Random Urine         Quantitative with Creat Ratio, CANCELED:         Hemoglobin A1c, CANCELED: PPSV23, IM/SUBQ (2+         YRS) - [id number]   Improved A1C from Jan of this year.  Recent brand switch.  Follow-up endo next week. Will forward lab results to endo    (Z11.59) Encounter for hepatitis C screening test for low risk patient  Comment:   Plan: CANCELED: **Hepatitis C Screen Reflex to RNA         FUTURE anytime            (Z30.41) Encounter for surveillance of contraceptive pills  Comment:  "  Plan: levonorgestrel-ethinyl estradiol (AVIANE)         0.1-20 MG-MCG tablet            (Z11.8) Special screening examination for chlamydial disease  Comment:   Plan: Chlamydia trachomatis PCR  No risk. Per [facility] guidelines    (Z23) Need for prophylactic vaccination and inoculation against influenza  Comment:   Plan: INFLUENZA VACCINE IM > 6 MONTHS VALENT IIV4         [74688], CANCELED: INFLUENZA VACCINE IM 4YRS+ 4        VALENT CCIIV4                 BMI:   Estimated body mass index is 27.66 kg/m  as calculated from the following:    Height as of this encounter: 1.635 m (5' 4.37\").    Weight as of this encounter: 73.9 kg (163 lb).   Weight management plan: Discussed healthy diet and exercise guidelines         Patient Instructions   [facility] - [phone]  Tatyana Brown or Cielo Valladares at [place];   Chapin Duarte, Sj Benjamin or Mely Kidd at [place];   Kurt Pavon - [place]      Outside of [place] - recommended by patients who have had good experiences    [facility] for [facility] and Wellness  Noemy Sandhu (?taking new patients?)  [phone]    [facility] in [place] - birth trauma, body work  [phone]    [facility] and [place] on [facility]  [phone]    Valarie Grover  [phone]  [address]   [place], [place], [address]    Delmer Rivera - \"sex-positive\"  [address]  [address]  [place], [place] [address]     Daniel Valdes - LGBT experience  [place] for [facility] on [facility] in [place]  [address]  [place] [address]  Telephone: [phone]    Angela Michel  [address]  [place] [address]  Phone: [phone] X [phone]  [email or web address]    [employer] systems   [email or web address]  [address]  [place] [address]  Tel: [phone]          Return in about 1 week (around 12/17/2020) " for endocrinology diabetes.    VELASQUEZ Adorno CNP  [facility]

## 2023-10-03 ENCOUNTER — HOSPITAL ENCOUNTER (OUTPATIENT)
Dept: WOUND CARE | Age: 53
Discharge: HOME | End: 2023-10-03
Payer: COMMERCIAL

## 2023-10-03 VITALS
DIASTOLIC BLOOD PRESSURE: 79 MMHG | RESPIRATION RATE: 20 BRPM | SYSTOLIC BLOOD PRESSURE: 169 MMHG | TEMPERATURE: 96.7 F | HEART RATE: 95 BPM

## 2023-10-03 PROCEDURE — 97597 DBRDMT OPN WND 1ST 20 CM/<: CPT

## 2023-10-03 PROCEDURE — 99213 OFFICE O/P EST LOW 20 MIN: CPT

## 2023-10-03 NOTE — DISCHARGE INSTRUCTIONS
[address] [facility]   Physician Orders and Discharge Instructions  [facility], [address] [place] [address] Avenue  Telephone: [phone]      NAME:  Cristal Galloway          YOB: 1970  MEDICAL RECORD NUMBER:  [id number]    Your  is:  Audra Nehal Isabel Care/Facility: None    Wound Location: Right lower leg     Dressing order:  Apply the Zinc layer of the unna boot , then cover the wound area with OPTILOCK then cover with coban. Messi Dejan unna boot in place until next scheduled appointment. Monitor circulation in feet and if needed, unwrap unna boot and apply dry dressing until next appointment. Compression:. Apply 5-10 mmHg Spandagrip to Left lower leg, apply first thing in the morning, remove at bedtime, elevate legs as much as possible during the day. Offloading Device:    Other Instructions: Use your Lymphedema pumps  for 1 hr 1-2 times a day. Elevate lower extremities as high and as much as possible. Keep all dressings clean, dry and intact. Keep pressure off the wound(s) at all times. Follow up visit   1 Week October 10, 2023 @     Please give 24 hour notice if unable to keep appointment. [phone]    If you experience any of the following, please call the Wound Care Service at  [phone] or go to the nearest emergency room. *Increase in pain *Temperature over 101 *Increase in drainage from your wound or a foul odor  *Uncontrolled swelling *Need for compression bandage changes due to slippage, breakthrough drainage       PLEASE NOTE: IF YOU ARE UNABLE TO OBTAIN WOUND SUPPLIES, CONTINUE TO USE THE SUPPLIES YOU HAVE AVAILABLE UNTIL YOU ARE ABLE TO REACH US.  IT IS MOST IMPORTANT TO KEEP THE WOUND COVERED AT ALL TIMES

## 2023-10-03 NOTE — WOUND CARE
[facility] Physician Billing Sheet. Ziggy Figueroa  AGE: 47 y.o.    GENDER: female  : 1970  TODAY'S DATE:  10/3/2023    ICD-10 CODES  I89.0  I87.2  L97.812         PHYSICIAN PROCEDURES    CPT CODE  02825  40971      Electronically signed by Barbara Beckwith DPM on 10/3/2023 at 5:08 PM

## 2023-10-03 NOTE — WOUND CARE
[facility]   Progress Note and Procedure Note      Raven Ramos  MEDICAL RECORD NUMBER:  [id number]  AGE: 47 y.o. GENDER: female  : 1970  EPISODE DATE:  10/3/2023    Subjective:     Chief Complaint   Patient presents with    Wound Check         HISTORY of PRESENT ILLNESS HPI     Raven Ramos is a 47 y.o. female who presents today for wound/ulcer evaluation. History of Wound Context:     52-year-old female with chronic lymphedema as well as venous insufficiency presents with new onset ulcer to the outside of her right leg. Patient states that the wound has been draining the past few days but denies constitutional signs of infection including fever, nausea, vomiting, chills    Wound/Ulcer Pain Timing/Severity: intermittent  Quality of pain: dull  Severity:  2 / 10   Modifying Factors: Pain worsens with walking  Associated Signs/Symptoms: edema    Ulcer Identification:  Ulcer Type: venous  Contributing Factors: venous stasis and lymphedema    Wound: N/A        PAST MEDICAL HISTORY        Diagnosis Date    Morbid obesity ([address])     Overactive bladder     Psychiatric problem        PAST SURGICAL HISTORY    Past Surgical History:   Procedure Laterality Date    ABDOMEN SURGERY      gastric bypass surgery    BACK SURGERY      CHOLECYSTECTOMY         FAMILY HISTORY    History reviewed. No pertinent family history.     SOCIAL HISTORY    Social History     Tobacco Use    Smoking status: Never    Smokeless tobacco: Never   Vaping Use    Vaping Use: Never used   Substance Use Topics    Alcohol use: Yes     Comment: occasional    Drug use: No       ALLERGIES    Allergies   Allergen Reactions    Vancomycin Rash and Hives    Amoxicillin Hives, Itching and Rash    Cephalexin Itching, Rash and Hives       MEDICATIONS    Current Outpatient Medications on File Prior to Encounter   Medication Sig Dispense Refill    Cholecalciferol (VITAMIN D3) 50 MCG ( UT) CAPS Take 1 capsule by mouth daily      Multiple

## 2023-10-10 ENCOUNTER — HOSPITAL ENCOUNTER (OUTPATIENT)
Dept: WOUND CARE | Age: 53
Discharge: HOME | End: 2023-10-10
Attending: STUDENT IN AN ORGANIZED HEALTH CARE EDUCATION/TRAINING PROGRAM
Payer: COMMERCIAL

## 2023-10-10 VITALS
RESPIRATION RATE: 20 BRPM | DIASTOLIC BLOOD PRESSURE: 84 MMHG | SYSTOLIC BLOOD PRESSURE: 156 MMHG | HEART RATE: 96 BPM | TEMPERATURE: 96.4 F

## 2023-10-10 DIAGNOSIS — E66.01 MORBID OBESITY DUE TO EXCESS CALORIES (HCC): Chronic | ICD-10-CM

## 2023-10-10 DIAGNOSIS — I89.0 LYMPH EDEMA: Chronic | ICD-10-CM

## 2023-10-10 DIAGNOSIS — L97.212 VENOUS STASIS ULCER OF RIGHT CALF WITH FAT LAYER EXPOSED WITH VARICOSE VEINS (HCC): Primary | Chronic | ICD-10-CM

## 2023-10-10 DIAGNOSIS — I83.012 VENOUS STASIS ULCER OF RIGHT CALF WITH FAT LAYER EXPOSED WITH VARICOSE VEINS (HCC): Primary | Chronic | ICD-10-CM

## 2023-10-10 PROCEDURE — 11042 DBRDMT SUBQ TIS 1ST 20SQCM/<: CPT

## 2023-10-10 RX ORDER — GABAPENTIN 600 MG/1
600 TABLET ORAL DAILY
Qty: 14 TABLET | Refills: 0 | Status: SHIPPED | OUTPATIENT
Start: 2023-10-10 | End: 2023-10-24

## 2023-10-10 NOTE — DISCHARGE INSTRUCTIONS
[address] [facility]   Physician Orders and Discharge Instructions  [facility], [address]  Telephone: [phone]        NAME:  Syl Pereyra                                                                                           YOB: 1970  MEDICAL RECORD NUMBER:  [id number]     Your  is:  Edy Davison Care/Facility: [facility]     Wound Location: Right lower leg      Dressing order: . 1. Cleanse wound(s) with normal saline. Apply Adaptic to the wound bed then:  2. Apply dry SILVERCEL OR CALCIUM ALGINATE WITH Ag or eqivalent to wound bed. 3. Cover with 4x4's and wrap with gauze (crispin or kerlix)  4. Change  Every other day or Monday, Wednesday, and Friday      Compression:. Apply 5-10 mmHg Spandagrip to the RIGHT AND LEFT  lower leg, apply first thing in the morning, remove at bedtime, elevate legs as much as possible during the day. (73)     Offloading Device:     Other Instructions:  the Topical Lidocaine(use around the wound bed - not in wound bed)  and the oral Gabapentin  at your pharmacy and use as prescribed. Use your Lymphedema pumps  for 1 hr 1-2 times a day. Elevate lower extremities as high and as much as possible. Use a good Cream and apply to the dry areas on your feet 2x day. (Not between toes). Keep all dressings clean, dry and intact. Keep pressure off the wound(s) at all times. Follow up visit   1 Week October 17, 2023 @ 2:15     Please give 24 hour notice if unable to keep appointment. [phone]     If you experience any of the following, please call the Wound Care Service at  [phone] or go to the nearest emergency room.         *Increase in pain         *Temperature over 101           *Increase in drainage from your wound or a foul odor  *Uncontrolled swelling            *Need for compression bandage changes due to slippage, Dr Correa  [phone]

## 2023-10-10 NOTE — WOUND CARE
[facility] Physician Billing Sheet. Willie May  AGE: 47 y.o.    GENDER: female  : 1970  TODAY'S DATE:  10/10/2023    ICD-10 CODES  I89.0  I87.2  L97.812         PHYSICIAN PROCEDURES    CPT CODE    21361      Electronically signed by Ricky Lara DPM on 10/10/2023 at 4:11 PM
[facility] Physician Billing Sheet. Elise Fall  AGE: 47 y.o.    GENDER: female  : 1970  TODAY'S DATE:  10/10/2023    ICD-10 CODES  I89.0  I87.2  L97.812         PHYSICIAN PROCEDURES    CPT CODE  11366      Electronically signed by Breezy Young DPM on 10/10/2023 at 3:50 PM
Center and [facility] Medicine   Physician Orders and Discharge Instructions  [facility], [address]  Telephone: [phone]        NAME:  Torey Baker                                                                                           YOB: 1970  MEDICAL RECORD NUMBER:  [id number]     Your  is:  Edy Davison Care/Facility: [address] [place] [address]      Wound Location: Right lower leg      Dressing order: . 1. Cleanse wound(s) with normal saline. Apply Adaptic to the wound bed then:  2. Apply dry SILVERCEL OR CALCIUM ALGINATE WITH Ag or eqivalent to wound bed. 3. Cover with 4x4's and wrap with gauze (crispin or kerlix)  4. Change  Every other day or Monday, Wednesday, and Friday      Compression:. Apply 5-10 mmHg Spandagrip to the RIGHT AND LEFT  lower leg, apply first thing in the morning, remove at bedtime, elevate legs as much as possible during the day. (73)     Offloading Device:     Other Instructions:  the Topical Lidocaine(use around the wound bed - not in wound bed)  and the oral Gabapentin  at your pharmacy and use as prescribed. Use your Lymphedema pumps  for 1 hr 1-2 times a day. Elevate lower extremities as high and as much as possible. Use a good Cream and apply to the dry areas on your feet 2x day. (Not between toes). Keep all dressings clean, dry and intact. Keep pressure off the wound(s) at all times. Follow up visit   1 Week October 17, 2023 @ 2:15     Please give 24 hour notice if unable to keep appointment. [phone]     If you experience any of the following, please call the Wound Care Service at  [phone] or go to the nearest emergency room.         *Increase in pain         *Temperature over 101           *Increase in drainage from your wound or a foul odor  *Uncontrolled swelling            *Need for compression bandage changes due to slippage, breakthrough drainage

## 2023-10-17 ENCOUNTER — HOSPITAL ENCOUNTER (OUTPATIENT)
Dept: WOUND CARE | Age: 53
Discharge: HOME | End: 2023-10-17
Attending: STUDENT IN AN ORGANIZED HEALTH CARE EDUCATION/TRAINING PROGRAM
Payer: COMMERCIAL

## 2023-10-17 VITALS
HEART RATE: 78 BPM | SYSTOLIC BLOOD PRESSURE: 158 MMHG | RESPIRATION RATE: 20 BRPM | DIASTOLIC BLOOD PRESSURE: 86 MMHG | TEMPERATURE: 97.6 F

## 2023-10-17 PROCEDURE — 11042 DBRDMT SUBQ TIS 1ST 20SQCM/<: CPT

## 2023-10-17 PROCEDURE — 11045 DBRDMT SUBQ TISS EACH ADDL: CPT

## 2023-10-17 RX ORDER — TRIAMCINOLONE ACETONIDE 0.25 MG/G
OINTMENT TOPICAL
Qty: 15 G | Refills: 2 | Status: SHIPPED | OUTPATIENT
Start: 2023-10-17 | End: 2023-10-24

## 2023-10-17 RX ORDER — LEVOTHYROXINE SODIUM 0.05 MG/1
50 TABLET ORAL DAILY
COMMUNITY

## 2023-10-17 RX ORDER — CHOLECALCIFEROL (VITAMIN D3) 125 MCG
500 CAPSULE ORAL DAILY
COMMUNITY

## 2023-10-17 ASSESSMENT — PAIN SCALES - GENERAL: PAINLEVEL_OUTOF10: 3

## 2023-10-17 ASSESSMENT — PAIN DESCRIPTION - LOCATION: LOCATION: LEG

## 2023-10-17 ASSESSMENT — PAIN DESCRIPTION - DESCRIPTORS: DESCRIPTORS: JABBING;NAGGING

## 2023-10-17 NOTE — DISCHARGE INSTRUCTIONS
[address] [facility]   Physician Orders and Discharge Instructions  [facility], [address]  Telephone: [phone]        NAME:  Elise Fall                                                                                           YOB: 1970  MEDICAL RECORD NUMBER:  [id number]     Your  is:  [address] [address] Care/Facility: [facility]     Wound Location: Right lower leg      Dressing order: . 1. Cleanse wound(s) with normal saline. Apply Triamcinolone to jamee wound. Apply Adaptic to the wound bed then:  2. Apply dry SILVERCEL OR CALCIUM ALGINATE WITH Ag or eqivalent to wound bed. 3. Cover with 4x4's and ABD pad wrap with gauze (crispin or kerlix)  4. Change  Every other day or Monday, Wednesday, and Friday          Compression:. Apply a 4\" ACE wrap from the base of the toes to the mid calf and then a 6\" ace wrap from mid calf to below the knee on right and left legs. Apply first thing in the morning, remove at bedtime, elevate legs as much as possible during the day. Offloading Device:     Other Instructions:  steroid cream from the pharmacy and use as prescribed. You can continue to use topical Lidocaine(use around the wound bed - not in wound bed)  and the oral Gabapentin for pain as needed. . Use your Lymphedema pumps  for 1 hr 1-2 times a day. Elevate lower extremities as high and as much as possible. Use a good Cream and apply to the dry areas on your feet 2x day. (Not between toes). Keep all dressings clean, dry and intact. Keep pressure off the wound(s) at all times. Follow up visit   1 Week October 24, 2023 @ 2:00pm     Please give 24 hour notice if unable to keep appointment. [phone]     If you experience any of the following, please call the Wound Care Service at  [phone] or go to the nearest emergency room.         *Increase in pain

## 2023-10-17 NOTE — WOUND CARE
[facility] Physician Billing Sheet. Kristin Robert  AGE: 47 y.o.    GENDER: female  : 1970  TODAY'S DATE:  10/17/2023    ICD-10 CODES  I89.0  I87.2  L97.812         PHYSICIAN PROCEDURES    CPT CODE  67206      Electronically signed by Jass Tran DPM on 10/17/2023 at 3:17 PM
1422   Wound Width (cm) 0.6 cm 10/17/23 1422   Wound Depth (cm) 0 cm 10/17/23 1422   Wound Surface Area (cm^2) 0.3 cm^2 10/17/23 1422   Change in Wound Size % (l*w) 92.42 10/17/23 1422   Wound Volume (cm^3) 0 cm^3 10/17/23 1422   Wound Healing % 100 10/17/23 1422   Post-Procedure Length (cm) 1.8 cm 10/03/23 1557   Post-Procedure Width (cm) 2.2 cm 10/03/23 1557   Post-Procedure Depth (cm) 0.1 cm 10/03/23 1557   Post-Procedure Surface Area (cm^2) 3.96 cm^2 10/03/23 1557   Post-Procedure Volume (cm^3) 0.396 cm^3 10/03/23 1557   Wound Assessment Dry;Pink/red 10/17/23 1422   Drainage Amount Scant (moist but unmeasurable) 10/17/23 1422   Drainage Description Serous 10/17/23 1422   Odor None 10/17/23 1422   Radha-wound Assessment Fragile;Dry/flaky 10/17/23 1422   Margins Attached edges 10/17/23 1422   Wound Thickness Description not for Pressure Injury Full thickness 10/17/23 1422   Number of days: 13             Percent of Wound/Ulcer Debrided: 100%    Total Surface Area Debrided:  27.5 sq cm     Diabetic/Pressure/Non Pressure Ulcers:  Ulcer: Non-Pressure ulcer, fat layer exposed      Bleeding:  Minimal    Hemostasis Achieved:  by pressure    Procedural Pain:  1  / 10     Post Procedural Pain:  1 / 10     Response to treatment:  Well tolerated by patient. Plan:     Patient seen and evaluated. I recommend the patient use the compression pumps to help control swelling and edema. I explained that these are limiting factors acting as a barrier to wound healing. We will continue silver alginate dressing changes 3 times a week with home health care. The addition of triamcinolone ointment was prescribed to be applied to the wound margin. We will see the patient back in 1 week      Treatment Note please see attached Discharge Instructions    In my professional opinion this patient would benefit from HBO Therapy: Yes    Written patient dismissal instructions given to patient and signed by patient or POA.

## 2023-11-07 ENCOUNTER — HOSPITAL ENCOUNTER (OUTPATIENT)
Dept: WOUND CARE | Age: 53
Discharge: HOME | End: 2023-11-07
Attending: STUDENT IN AN ORGANIZED HEALTH CARE EDUCATION/TRAINING PROGRAM
Payer: COMMERCIAL

## 2023-11-07 VITALS
TEMPERATURE: 98 F | DIASTOLIC BLOOD PRESSURE: 82 MMHG | RESPIRATION RATE: 20 BRPM | SYSTOLIC BLOOD PRESSURE: 171 MMHG | HEART RATE: 99 BPM

## 2023-11-07 PROCEDURE — 11042 DBRDMT SUBQ TIS 1ST 20SQCM/<: CPT

## 2023-11-07 ASSESSMENT — PAIN SCALES - GENERAL: PAINLEVEL_OUTOF10: 0

## 2023-11-07 NOTE — WOUND CARE
[facility] Physician Billing Sheet. Branden Andersen  AGE: 47 y.o.    GENDER: female  : 1970  TODAY'S DATE:  2023    ICD-10 CODES  I89.0  I87.2  L97.812  L97.811      PHYSICIAN PROCEDURES    CPT CODE  77472      Electronically signed by Astrid Read DPM on 2023 at 3:53 PM

## 2023-11-07 NOTE — DISCHARGE INSTRUCTIONS
[address] [facility]   Physician Orders and Discharge Instructions  [facility], [address]  Telephone: [phone]        NAME:  Zahraa Hugo                                                                                           YOB: 1970  MEDICAL RECORD NUMBER:  [id number]     Your  is:  [address] [address] Care/Facility: [facility]     Wound Location: Right lower leg, Left Lateral Leg       Dressing order: . 1. Cleanse wound(s) with normal saline. Apply Triamcinolone to jamee wound. Apply Adaptic to the wound bed then:  2. Apply dry SILVERCEL OR CALCIUM ALGINATE WITH Ag or eqivalent to wound bed. 3. Cover with 4x4's and ABD then Drawtex or Optilock  pad wrap with gauze (crispin or kerlix)  4. Change  Every other day or Monday, Wednesday, and Friday            Compression:. Apply a 4\" ACE wrap from the base of the toes to the mid calf and then a 6\" ace wrap from mid calf to below the knee on right and left legs. Apply first thing in the morning, remove at bedtime, elevate legs as much as possible during the day. Offloading Device:     Other Instructions: You can continue to use topical Triamcinolone cream(use around the wound bed - not in wound bed)  and the oral Gabapentin and Lidocaine cream for pain as needed. . Use your Lymphedema pumps  for 1 hr 2 times a day. Elevate lower extremities as high and as much as possible. Use a good Cream and apply to the dry areas on your feet 2x day. (Not between toes). Keep all dressings clean, dry and intact. Keep pressure off the wound(s) at all times. Follow up visit   2 Weeks November 21, 2023 at 3:00pm     Please give 24 hour notice if unable to keep appointment. [phone]     If you experience any of the following, please call the Wound Care Service at  [phone] or go to the nearest emergency room.

## 2023-12-26 ENCOUNTER — HOSPITAL ENCOUNTER (OUTPATIENT)
Dept: WOUND CARE | Age: 53
Discharge: HOME | End: 2023-12-26
Attending: STUDENT IN AN ORGANIZED HEALTH CARE EDUCATION/TRAINING PROGRAM
Payer: COMMERCIAL

## 2023-12-26 VITALS
DIASTOLIC BLOOD PRESSURE: 71 MMHG | SYSTOLIC BLOOD PRESSURE: 153 MMHG | HEART RATE: 89 BPM | TEMPERATURE: 97 F | RESPIRATION RATE: 20 BRPM

## 2023-12-26 PROCEDURE — 11045 DBRDMT SUBQ TISS EACH ADDL: CPT

## 2023-12-26 PROCEDURE — 11042 DBRDMT SUBQ TIS 1ST 20SQCM/<: CPT

## 2023-12-26 NOTE — WOUND CARE
[address]   Progress Note and Procedure Note      Dave Ortiz  MEDICAL RECORD NUMBER:  [id number]  AGE: 47 y.o. GENDER: female  : 1970  EPISODE DATE:  2023    Subjective:     Chief Complaint   Patient presents with    Wound Check         HISTORY of PRESENT ILLNESS HPI     Dave Ortiz is a 47 y.o. female who presents today for wound/ulcer evaluation. History of Wound Context:     Chronic venous stasis wound complicated by lymphedema    Wound/Ulcer Pain Timing/Severity: intermittent  Quality of pain: aching  Severity:  5 / 10   Modifying Factors: Pain worsens with walking  Associated Signs/Symptoms: edema    Ulcer Identification:  Ulcer Type: venous and pressure  Contributing Factors: edema, venous stasis, and lymphedema    Wound: N/A        PAST MEDICAL HISTORY        Diagnosis Date    Morbid obesity ([address])     Overactive bladder     Psychiatric problem        PAST SURGICAL HISTORY    Past Surgical History:   Procedure Laterality Date    ABDOMEN SURGERY      gastric bypass surgery    BACK SURGERY      CHOLECYSTECTOMY         FAMILY HISTORY    History reviewed. No pertinent family history. SOCIAL HISTORY    Social History     Tobacco Use    Smoking status: Never    Smokeless tobacco: Never   Vaping Use    Vaping Use: Never used   Substance Use Topics    Alcohol use: Yes     Comment: occasional    Drug use: No       ALLERGIES    Allergies   Allergen Reactions    Vancomycin Rash and Hives    Amoxicillin Hives, Itching and Rash    Cephalexin Itching, Rash and Hives       MEDICATIONS    Current Outpatient Medications on File Prior to Encounter   Medication Sig Dispense Refill    traMADol (ULTRAM) 50 MG tablet Take 1 tablet by mouth every 8 hours as needed for Pain for up to 7 days. Intended supply: 3 days. Take lowest dose possible to manage pain Max Daily Amount: 150 mg 12 tablet 0    gabapentin (NEURONTIN) 600 MG tablet Take 1 tablet by mouth daily for 7 days.  7 tablet 0

## 2023-12-26 NOTE — WOUND CARE
[facility] Physician Billing Sheet. Dave Ortiz  AGE: 47 y.o.    GENDER: female  : 1970  TODAY'S DATE:  2023    ICD-10 CODES  I89.0  I87.2  L97.812  L97.811      PHYSICIAN PROCEDURES    CPT CODE  60423  74473 2 units    Electronically signed by Naya Dobbs DPM on 2023 at 3:48 PM

## 2023-12-26 NOTE — FLOWSHEET NOTE
[employer] Application   Below Knee    NAME:  Aram Singer  YOB: 1970  MEDICAL RECORD NUMBER:  [id number]  DATE:  12/26/2023    Duayne Push boot: Applied moisturizing agent to dry skin as needed. Appied primary and secondary dressing as ordered. Applied Unna roll from toes to knee overlapping each time. Applied ace wrap or coban from toes to below the knee. Secured with tape and/or metal clips covered with tape. Instructed patient/caregiver to keep dressing dry and intact. DO NOT REMOVE DRESSING. Instructed pt/family/caregiver to report excessive draining, loose bandage, wet dressing, severe pain or tingling in toes. Applied [employer] dressing below the knee to right lower leg. Applied [employer] dressing below the knee to left lower leg. Unna Boot(s) were applied per  Guidelines.      Electronically signed by Tisha Muhammad RN on 12/26/2023 at 3:57 PM

## 2023-12-26 NOTE — DISCHARGE INSTRUCTIONS
[address] [facility]   Physician Orders and Discharge Instructions  [facility], [address]  Telephone: [phone]        NAME:  Jaswinder Bustamante                                                                                           YOB: 1970  MEDICAL RECORD NUMBER:  [id number]     Your  is:  [address] [address] Care/Facility: [facility]     Wound Location: Right lower leg, Left Lateral Leg       Dressing order: . 1. Cleanse wound(s) with normal saline. Apply Triamcinolone to jamee wound. Apply a Zinc Unna boot then then place a piece of Drawtex over the unna boots where the wounds are. Then apply ace wraps from base of toes to below the knee. CHANGE THE UNNA BOOTS ON FRIDAY DECEMBER 29, 2023. Compression:.   unna boots     Offloading Device:     Other Instructions: You can continue to use topical Triamcinolone cream(use around the wound bed - not in wound bed)  and the oral Gabapentin and Lidocaine cream for pain as needed. . Elevate lower extremities as high and as much as possible. Use a good Cream and apply to the dry areas on your feet 2x day. (Not between toes). Keep all dressings clean, dry and intact. Keep pressure off the wound(s) at all times. Follow up visit: 1 week January 2, 2023 @         Please give 24 hour notice if unable to keep appointment. [phone]     If you experience any of the following, please call the Wound Care Service at  [phone] or go to the nearest emergency room.         *Increase in pain         *Temperature over 101           *Increase in drainage from your wound or a foul odor  *Uncontrolled swelling            *Need for compression bandage changes due to slippage, breakthrough drainage       PLEASE NOTE: IF YOU ARE UNABLE TO OBTAIN WOUND SUPPLIES, CONTINUE TO USE THE SUPPLIES YOU HAVE AVAILABLE UNTIL YOU ARE ABLE TO

## 2024-01-16 ENCOUNTER — HOSPITAL ENCOUNTER (OUTPATIENT)
Dept: WOUND CARE | Age: 54
Discharge: HOME | End: 2024-01-16
Attending: STUDENT IN AN ORGANIZED HEALTH CARE EDUCATION/TRAINING PROGRAM
Payer: COMMERCIAL

## 2024-01-16 VITALS
HEART RATE: 97 BPM | RESPIRATION RATE: 20 BRPM | TEMPERATURE: 96.6 F | DIASTOLIC BLOOD PRESSURE: 94 MMHG | SYSTOLIC BLOOD PRESSURE: 157 MMHG

## 2024-01-16 PROCEDURE — 97597 DBRDMT OPN WND 1ST 20 CM/<: CPT

## 2024-01-16 PROCEDURE — 97598 DBRDMT OPN WND ADDL 20CM/<: CPT

## 2024-01-16 RX ORDER — LIDOCAINE 40 MG/G
CREAM TOPICAL ONCE
OUTPATIENT
Start: 2024-01-16 | End: 2024-01-16

## 2024-01-16 RX ORDER — CLOBETASOL PROPIONATE 0.5 MG/G
OINTMENT TOPICAL ONCE
OUTPATIENT
Start: 2024-01-16 | End: 2024-01-16

## 2024-01-16 RX ORDER — GENTAMICIN SULFATE 1 MG/G
OINTMENT TOPICAL ONCE
OUTPATIENT
Start: 2024-01-16 | End: 2024-01-16

## 2024-01-16 RX ORDER — BACITRACIN ZINC AND POLYMYXIN B SULFATE 500; 1000 [USP'U]/G; [USP'U]/G
OINTMENT TOPICAL ONCE
OUTPATIENT
Start: 2024-01-16 | End: 2024-01-16

## 2024-01-16 RX ORDER — LIDOCAINE 50 MG/G
OINTMENT TOPICAL ONCE
OUTPATIENT
Start: 2024-01-16 | End: 2024-01-16

## 2024-01-16 RX ORDER — TRIAMCINOLONE ACETONIDE 1 MG/G
OINTMENT TOPICAL ONCE
OUTPATIENT
Start: 2024-01-16 | End: 2024-01-16

## 2024-01-16 RX ORDER — LIDOCAINE HYDROCHLORIDE 20 MG/ML
JELLY TOPICAL ONCE
OUTPATIENT
Start: 2024-01-16 | End: 2024-01-16

## 2024-01-16 RX ORDER — GINSENG 100 MG
CAPSULE ORAL ONCE
OUTPATIENT
Start: 2024-01-16 | End: 2024-01-16

## 2024-01-16 RX ORDER — SODIUM CHLOR/HYPOCHLOROUS ACID 0.033 %
SOLUTION, IRRIGATION IRRIGATION ONCE
OUTPATIENT
Start: 2024-01-16 | End: 2024-01-16

## 2024-01-16 RX ORDER — IBUPROFEN 200 MG
TABLET ORAL ONCE
OUTPATIENT
Start: 2024-01-16 | End: 2024-01-16

## 2024-01-16 RX ORDER — BETAMETHASONE DIPROPIONATE 0.5 MG/G
CREAM TOPICAL ONCE
OUTPATIENT
Start: 2024-01-16 | End: 2024-01-16

## 2024-01-16 RX ORDER — LIDOCAINE HYDROCHLORIDE 40 MG/ML
SOLUTION TOPICAL ONCE
OUTPATIENT
Start: 2024-01-16 | End: 2024-01-16

## 2024-01-16 NOTE — PLAN OF CARE
Problem: Wound:  Goal: Will show signs of wound healing; wound closure and no evidence of infection  Outcome: Progressing

## 2024-01-16 NOTE — PLAN OF CARE
Unna Boot Application   Below Knee    NAME:  Ana Laura Gongora  YOB: 1970  MEDICAL RECORD NUMBER:  [id number]  DATE:  1/16/2024    Unna boot: Applied moisturizing agent to dry skin as needed.   Appied primary and secondary dressing as ordered.  Applied Unna roll from toes to knee overlapping each time.   Applied ace wrap or coban from toes to below the knee.   Secured with tape and/or metal clips covered with tape.   Instructed patient/caregiver to keep dressing dry and intact. DO NOT REMOVE DRESSING.   Instructed pt/family/caregiver to report excessive draining, loose bandage, wet dressing, severe pain or tingling in toes.  Applied Unna Boot dressing below the knee to right lower leg.  Applied Unna Boot dressing below the knee to left lower leg.    Unna Boot(s) were applied per  Guidelines.     Electronically signed by Shefali Daniel RN on 1/16/2024 at 2:51 PM

## 2024-01-16 NOTE — DISCHARGE INSTRUCTIONS
[facility]   Physician Orders and Discharge Instructions  [facility]  [address] [place]  [address]  Telephone: [phone]      [phone]        NAME:  Ana Laura Gongora                                                                                           YOB: 1970  MEDICAL RECORD NUMBER:  [id number]     Your  is:  [place]     Home Care/Facility: [facility]     Wound Location: Right lower leg, Left Lateral Leg       Dressing order:   1.Cleanse wound(s) with normal saline. Apply Triamcinolone to jamee wound.     2. Apply a Zinc Unna boot then then place a piece of Drawtex over the unna boots where the wounds are.   3. Apply ace wraps from base of toes to below the knee.   4. Change twice weekly         Compression:.   unna boots     Offloading Device:     Other Instructions: Continue to use lymph edema pumps as much as tolerated. You can continue to use topical Triamcinolone cream(use around the wound bed - not in wound bed)  and the oral Gabapentin and Lidocaine cream for pain as needed.  . Elevate lower extremities as high and as much as possible. Use a good Cream and apply to the dry areas on your feet 2x day.(Not between toes).     Keep all dressings clean, dry and intact.  Keep pressure off the wound(s) at all times.      Follow up visit: 2 weeks January 30, 2023 @   2:00pm     Please give 24 hour notice if unable to keep appointment. [phone]     If you experience any of the following, please call the Wound Care Service at  [phone] or go to the nearest emergency room.        *Increase in pain         *Temperature over 101           *Increase in drainage from your wound or a foul odor  *Uncontrolled swelling            *Need for compression bandage changes due to slippage, breakthrough drainage       PLEASE NOTE: IF YOU ARE UNABLE TO OBTAIN WOUND SUPPLIES, CONTINUE TO USE THE SUPPLIES

## 2024-01-16 NOTE — WOUND CARE
[facility] Physician Billing Sheet.     Ana Laura Gongora  AGE: 53 y.o.   GENDER: female  : 1970  TODAY'S DATE:  2024    ICD-10 CODES  I89.0  I87.2  L97.812  L97.811      PHYSICIAN PROCEDURES    CPT CODE  63430      Electronically signed by German Durant DPM on 2024 at 2:46 PM       
Pain:  2 / 10     Response to treatment:  Well tolerated by patient.       Plan:   Plan for 1 more week of Unna boot therapy at which point we will de-escalate. Mechanical compression hose as well as patient's lymphedema pumps.  Will see the patient back in 1 week    Treatment Note please see attached Discharge Instructions    In my professional opinion this patient would benefit from HBO Therapy: Undecided    Written patient dismissal instructions given to patient and signed by patient or POA.         Discharge Instructions              [facility]   Physician Orders and Discharge Instructions  [address] [id number]  Telephone: [phone]      [phone]        NAME:  Ana Laura Gongora                                                                                           YOB: 1970  MEDICAL RECORD NUMBER:  [id number]     Your  is:  [place]     [place] Care/Facility: [facility]     Wound Location: Right lower leg, Left Lateral Leg       Dressing order:   1.Cleanse wound(s) with normal saline. Apply Triamcinolone to jamee wound.     2. Apply a Zinc Unna boot then then place a piece of Drawtex over the unna boots where the wounds are.   3. Apply ace wraps from base of toes to below the knee.   4. Change twice weekly         Compression:.   unna boots     Offloading Device:     Other Instructions: Continue to use lymph edema pumps as much as tolerated. You can continue to use topical Triamcinolone cream(use around the wound bed - not in wound bed)  and the oral Gabapentin and Lidocaine cream for pain as needed.  . Elevate lower extremities as high and as much as possible. Use a good Cream and apply to the dry areas on your feet 2x day.(Not between toes).     Keep all dressings clean, dry and intact.  Keep pressure off the wound(s) at all times.      Follow up visit: 2 weeks January 30, 2023

## 2024-02-06 ENCOUNTER — HOSPITAL ENCOUNTER (OUTPATIENT)
Dept: WOUND CARE | Age: 54
Discharge: HOME | End: 2024-02-06
Attending: STUDENT IN AN ORGANIZED HEALTH CARE EDUCATION/TRAINING PROGRAM
Payer: COMMERCIAL

## 2024-02-06 VITALS
HEART RATE: 98 BPM | DIASTOLIC BLOOD PRESSURE: 80 MMHG | TEMPERATURE: 97.3 F | RESPIRATION RATE: 20 BRPM | SYSTOLIC BLOOD PRESSURE: 148 MMHG

## 2024-02-06 DIAGNOSIS — I89.0 LYMPH EDEMA: Primary | ICD-10-CM

## 2024-02-06 DIAGNOSIS — I83.022 VENOUS STASIS ULCER OF LEFT CALF LIMITED TO BREAKDOWN OF SKIN WITH VARICOSE VEINS (HCC): ICD-10-CM

## 2024-02-06 DIAGNOSIS — E66.01 MORBID OBESITY DUE TO EXCESS CALORIES (HCC): ICD-10-CM

## 2024-02-06 DIAGNOSIS — L97.221 VENOUS STASIS ULCER OF LEFT CALF LIMITED TO BREAKDOWN OF SKIN WITH VARICOSE VEINS (HCC): ICD-10-CM

## 2024-02-06 PROCEDURE — 99213 OFFICE O/P EST LOW 20 MIN: CPT

## 2024-02-06 RX ORDER — SODIUM CHLOR/HYPOCHLOROUS ACID 0.033 %
SOLUTION, IRRIGATION IRRIGATION ONCE
OUTPATIENT
Start: 2024-02-06 | End: 2024-02-06

## 2024-02-06 RX ORDER — GENTAMICIN SULFATE 1 MG/G
OINTMENT TOPICAL ONCE
OUTPATIENT
Start: 2024-02-06 | End: 2024-02-06

## 2024-02-06 RX ORDER — BETAMETHASONE DIPROPIONATE 0.5 MG/G
CREAM TOPICAL ONCE
OUTPATIENT
Start: 2024-02-06 | End: 2024-02-06

## 2024-02-06 RX ORDER — BACITRACIN ZINC AND POLYMYXIN B SULFATE 500; 1000 [USP'U]/G; [USP'U]/G
OINTMENT TOPICAL ONCE
OUTPATIENT
Start: 2024-02-06 | End: 2024-02-06

## 2024-02-06 RX ORDER — IBUPROFEN 200 MG
TABLET ORAL ONCE
OUTPATIENT
Start: 2024-02-06 | End: 2024-02-06

## 2024-02-06 RX ORDER — LIDOCAINE HYDROCHLORIDE 20 MG/ML
JELLY TOPICAL ONCE
OUTPATIENT
Start: 2024-02-06 | End: 2024-02-06

## 2024-02-06 RX ORDER — LIDOCAINE 50 MG/G
OINTMENT TOPICAL ONCE
OUTPATIENT
Start: 2024-02-06 | End: 2024-02-06

## 2024-02-06 RX ORDER — LIDOCAINE 40 MG/G
CREAM TOPICAL ONCE
OUTPATIENT
Start: 2024-02-06 | End: 2024-02-06

## 2024-02-06 RX ORDER — CLOBETASOL PROPIONATE 0.5 MG/G
OINTMENT TOPICAL ONCE
OUTPATIENT
Start: 2024-02-06 | End: 2024-02-06

## 2024-02-06 RX ORDER — TRIAMCINOLONE ACETONIDE 1 MG/G
OINTMENT TOPICAL ONCE
OUTPATIENT
Start: 2024-02-06 | End: 2024-02-06

## 2024-02-06 RX ORDER — LIDOCAINE HYDROCHLORIDE 40 MG/ML
SOLUTION TOPICAL ONCE
OUTPATIENT
Start: 2024-02-06 | End: 2024-02-06

## 2024-02-06 RX ORDER — GINSENG 100 MG
CAPSULE ORAL ONCE
OUTPATIENT
Start: 2024-02-06 | End: 2024-02-06

## 2024-02-06 NOTE — DISCHARGE INSTRUCTIONS
[facility]   Physician Orders and Discharge Instructions  [facility]  [address] [place]  [address]  Telephone: [phone]      [phone]        NAME:  Ana Laura Gongora                                                                                           YOB: 1970  MEDICAL RECORD NUMBER:  [id number]     Your  is:  [address]     Home Care/Facility: [facility]     Wound Location: Right lower leg, Left Lateral Leg       Dressing order: Leave wounds open today in the wound center  If wounds open in between follow up visits apply a zinc unna boot to the leg with open areas. Cover with drawtex and coban wrap and change twice weekly.           Compression: Apply low compression hose to  bilateral lower leg(s), may remove at bedtime, reapply first thing in the morning, avoid prolonged standing, elevate legs when sitting.       Offloading Device:     Other Instructions: Continue to use lymph edema pumps twice daily. You can continue to use topical Triamcinolone cream as needed. You can take Gabapentin and use Lidocaine cream for pain as needed.  . Elevate lower extremities as high and as much as possible. Use a good Cream and apply to the dry areas on your feet 2x day.(Not between toes).     Keep all dressings clean, dry and intact.  Keep pressure off the wound(s) at all times.      Follow up visit: 2 weeks February 20, 2023 @   2:00pm     Please give 24 hour notice if unable to keep appointment. [phone]     If you experience any of the following, please call the Wound Care Service at  [phone] or go to the nearest emergency room.        *Increase in pain         *Temperature over 101           *Increase in drainage from your wound or a foul odor  *Uncontrolled swelling            *Need for compression bandage changes due to slippage, breakthrough drainage       PLEASE NOTE: IF YOU ARE UNABLE TO

## 2024-02-07 NOTE — WOUND CARE
[facility] Physician Billing Sheet.     Ana Laura Gongora  AGE: 53 y.o.   GENDER: female  : 1970  TODAY'S DATE:  2024    ICD-10 CODES  I89.0  I87.2  L97.812  L97.811      PHYSICIAN PROCEDURES    CPT CODE  33600      Electronically signed by German Durant DPM on 2024 at 2:14 PM       
[facility] Physician Billing Sheet.     Ana Laura Gongora  AGE: 53 y.o.   GENDER: female  : 1970  TODAY'S DATE:  2024    ICD-10 CODES  I89.0  I87.2  L97.812         PHYSICIAN PROCEDURES    CPT CODE  40578        Electronically signed by German Durant DPM on 2024 at 2:49 PM       
Description not for Pressure Injury Full thickness 01/16/24 1404   Number of days: 91       Wound 12/26/23 Leg Left;Lower;Distal #3 (Active)   Wound Image   02/06/24 1352   Wound Etiology Venous 02/06/24 1352   Wound Cleansed Cleansed with saline 02/06/24 1352   Dressing/Treatment Open to air 02/06/24 1435   Wound Length (cm) 0 cm 02/06/24 1352   Wound Width (cm) 0 cm 02/06/24 1352   Wound Depth (cm) 0 cm 02/06/24 1352   Wound Surface Area (cm^2) 0 cm^2 02/06/24 1352   Change in Wound Size % (l*w) 100 02/06/24 1352   Wound Volume (cm^3) 0 cm^3 02/06/24 1352   Wound Healing % 100 02/06/24 1352   Post-Procedure Length (cm) 1.5 cm 01/16/24 1434   Post-Procedure Width (cm) 1 cm 01/16/24 1434   Post-Procedure Depth (cm) 0.1 cm 01/16/24 1434   Post-Procedure Surface Area (cm^2) 1.5 cm^2 01/16/24 1434   Post-Procedure Volume (cm^3) 0.15 cm^3 01/16/24 1434   Wound Assessment Dry 02/06/24 1352   Drainage Amount None (dry) 02/06/24 1352   Drainage Description Serous;Yellow 01/16/24 1404   Odor None 02/06/24 1352   Radha-wound Assessment Fragile;Intact 01/16/24 1404   Margins Defined edges 01/16/24 1404   Wound Thickness Description not for Pressure Injury Full thickness 01/16/24 1404   Number of days: 42              Plan:       Will continue unnaboot applications should the need arise. Will see back in 2 weeks for re-evaluation       Treatment Note please see attached Discharge Instructions    In my professional opinion this patient would benefit from HBO Therapy: Undecided    Written patient dismissal instructions given to patient and signed by patient or POA.         Discharge Instructions         [facility]   Physician Orders and Discharge Instructions  [address]  [address]  Telephone: [phone]      [phone]        NAME:  Ana Laura Valenciacyndys

## 2024-04-01 ENCOUNTER — TELEPHONE (OUTPATIENT)
Dept: WOUND CARE | Age: 54
End: 2024-04-01

## 2024-04-01 NOTE — TELEPHONE ENCOUNTER
Called pt to see if a follow up appointment is needed. Pt states she wants to hold off at this time because she has had to cancel several appointments due to transportation issues. Pt states the transportation she uses keeps sending vehicles to pick her up  that she cannot fit in. Will d/c from wound center at this time.

## 2024-08-27 ENCOUNTER — HOSPITAL ENCOUNTER (OUTPATIENT)
Dept: WOUND CARE | Age: 54
Discharge: HOME | End: 2024-08-27
Attending: STUDENT IN AN ORGANIZED HEALTH CARE EDUCATION/TRAINING PROGRAM
Payer: COMMERCIAL

## 2024-08-27 VITALS
HEART RATE: 110 BPM | TEMPERATURE: 97 F | RESPIRATION RATE: 16 BRPM | DIASTOLIC BLOOD PRESSURE: 84 MMHG | SYSTOLIC BLOOD PRESSURE: 177 MMHG

## 2024-08-27 DIAGNOSIS — L97.212 VENOUS STASIS ULCER OF RIGHT CALF WITH FAT LAYER EXPOSED WITH VARICOSE VEINS (HCC): Primary | Chronic | ICD-10-CM

## 2024-08-27 DIAGNOSIS — I83.012 VENOUS STASIS ULCER OF RIGHT CALF WITH FAT LAYER EXPOSED WITH VARICOSE VEINS (HCC): Primary | Chronic | ICD-10-CM

## 2024-08-27 DIAGNOSIS — I83.022 VENOUS STASIS ULCER OF LEFT CALF LIMITED TO BREAKDOWN OF SKIN WITH VARICOSE VEINS (HCC): Chronic | ICD-10-CM

## 2024-08-27 DIAGNOSIS — L97.221 VENOUS STASIS ULCER OF LEFT CALF LIMITED TO BREAKDOWN OF SKIN WITH VARICOSE VEINS (HCC): Chronic | ICD-10-CM

## 2024-08-27 PROCEDURE — 11045 DBRDMT SUBQ TISS EACH ADDL: CPT

## 2024-08-27 PROCEDURE — 11042 DBRDMT SUBQ TIS 1ST 20SQCM/<: CPT

## 2024-08-27 ASSESSMENT — PAIN SCALES - GENERAL: PAINLEVEL_OUTOF10: 0

## 2024-08-27 NOTE — FLOWSHEET NOTE
Unna Boot Application   Below Knee    NAME:  Ana Laura Gongora  YOB: 1970  MEDICAL RECORD NUMBER:  [id number]  DATE:  8/27/2024    Unna boot: Applied moisturizing agent to dry skin as needed.   Appied primary and secondary dressing as ordered.  Applied Unna roll from toes to knee overlapping each time.   Applied ace wrap or coban from toes to below the knee.   Secured with tape and/or metal clips covered with tape.   Instructed patient/caregiver to keep dressing dry and intact. DO NOT REMOVE DRESSING.   Instructed pt/family/caregiver to report excessive draining, loose bandage, wet dressing, severe pain or tingling in toes.  Applied Unna Boot dressing below the knee to right lower leg.  Applied Unna Boot dressing below the knee to left lower leg.    Unna Boot(s) were applied per  Guidelines.     Electronically signed by La Hendrickson RN on 8/27/2024 at 3:45 PM

## 2024-08-27 NOTE — DISCHARGE INSTRUCTIONS
[facility]   Physician Orders and Discharge Instructions  [facility]  [address] [place]  [address]  Telephone: [phone]      [phone]        NAME:  Ana Laura Gongora                                                                                           YOB: 1970  MEDICAL RECORD NUMBER:  [id number]     Your  is:  [address]     Home Care/Facility: We will send referral to [facility] health [facility]      Wound Location: Right lower leg (2 areas), Left Lateral Leg, left Medial Leg        Dressing order:   1.Cleanse wound(s) with normal saline. Apply Triamcinolone to jamee wound.     2. Apply a Zinc Unna boot then then place a piece of Drawtex over the unna boots where the wounds are.   3. Then apply coban from base of toes to below the knee.   4. Change twice weekly           Compression:.   unna boots     Offloading Device:     Other Instructions: You can continue to use topical Triamcinolone cream(use around the wound bed - not in wound bed)  Elevate lower extremities as high and as much as possible. Use a good cream and apply to the dry areas on your feet 2x a day.(Not between toes).     Keep all dressings clean, dry and intact.  Keep pressure off the wound(s) at all times.      Follow up visit: 1 week September 3 2024 @  3:00pm     Please give 24 hour notice if unable to keep appointment. [phone]     If you experience any of the following, please call the Wound Care Service at  [phone] or go to the nearest emergency room.        *Increase in pain         *Temperature over 101           *Increase in drainage from your wound or a foul odor  *Uncontrolled swelling            *Need for compression bandage changes due to slippage, breakthrough drainage       PLEASE NOTE: IF YOU ARE UNABLE TO OBTAIN WOUND SUPPLIES, CONTINUE TO USE THE SUPPLIES YOU HAVE AVAILABLE UNTIL YOU ARE ABLE TO REACH US. IT IS MOST

## 2024-08-27 NOTE — WOUND CARE
[facility] Physician Billing Sheet.     Ana Laura Gongora  AGE: 54 y.o.   GENDER: female  : 1970  TODAY'S DATE:  2024    ICD-10 CODES  I87.313  L97.512  L97.511    PHYSICIAN PROCEDURES    CPT CODE  42429  85142 3units       Electronically signed by German Durant DPM on 2024 at 3:46 PM

## 2024-08-27 NOTE — WOUND CARE
[facility]   Progress Note and Procedure Note      Ana Laura Gongora  MEDICAL RECORD NUMBER:  [id number]  AGE: 54 y.o.   GENDER: female  : 1970  EPISODE DATE:  2024    Subjective:     Chief Complaint   Patient presents with    Wound Check         HISTORY of PRESENT ILLNESS HPI     Ana Laura Gongora is a 54 y.o. female who presents today for wound/ulcer evaluation.   History of Wound Context: Venous wounds bilateral     Wound/Ulcer Pain Timing/Severity: intermittent  Quality of pain: aching  Severity:  2 / 10   Modifying Factors: Pain worsens with walking  Associated Signs/Symptoms: edema    Ulcer Identification:  Ulcer Type: venous  Contributing Factors: edema, venous stasis, and lymphedema    Wound: N/A        PAST MEDICAL HISTORY        Diagnosis Date    Morbid obesity (HCC)     Overactive bladder     Psychiatric problem        PAST SURGICAL HISTORY    Past Surgical History:   Procedure Laterality Date    ABDOMEN SURGERY      gastric bypass surgery    BACK SURGERY      CHOLECYSTECTOMY         FAMILY HISTORY    No family history on file.    SOCIAL HISTORY    Social History     Tobacco Use    Smoking status: Never    Smokeless tobacco: Never   Vaping Use    Vaping status: Never Used   Substance Use Topics    Alcohol use: Yes     Comment: occasional    Drug use: No       ALLERGIES    Allergies   Allergen Reactions    Vancomycin Rash and Hives    Amoxicillin Hives, Itching and Rash    Cephalexin Itching, Rash and Hives       MEDICATIONS    Current Outpatient Medications on File Prior to Encounter   Medication Sig Dispense Refill    levothyroxine (SYNTHROID) 50 MCG tablet Take 1 tablet by mouth Daily      vitamin B-12 (CYANOCOBALAMIN) 500 MCG tablet Take 1 tablet by mouth daily      Cholecalciferol (VITAMIN D3) 50 MCG ( UT) CAPS Take 1 capsule by mouth daily      vitamin C (ASCORBIC ACID) 500 MG tablet Take 1 tablet by mouth daily      topiramate (TOPAMAX) 25 MG tablet Take 2 tablets by mouth 2  1508   Wound Thickness Description not for Pressure Injury Full thickness 08/27/24 1508   Number of days: 0       Wound 08/27/24 Leg Left;Medial #3 (Active)   Wound Image   08/27/24 1508   Wound Etiology Venous 08/27/24 1508   Wound Cleansed Cleansed with saline 08/27/24 1508   Wound Length (cm) 7.2 cm 08/27/24 1508   Wound Width (cm) 8.5 cm 08/27/24 1508   Wound Depth (cm) 0.1 cm 08/27/24 1508   Wound Surface Area (cm^2) 61.2 cm^2 08/27/24 1508   Wound Volume (cm^3) 6.12 cm^3 08/27/24 1508   Wound Assessment Slough;Pink/red 08/27/24 1508   Drainage Amount Large (50-75% saturated) 08/27/24 1508   Drainage Description Serosanguinous 08/27/24 1508   Odor Mild 08/27/24 1508   Radha-wound Assessment Edematous;Dry/flaky 08/27/24 1508   Margins Defined edges 08/27/24 1508   Wound Thickness Description not for Pressure Injury Full thickness 08/27/24 1508   Number of days: 0       Wound 08/27/24 Leg Left;Anterior #4 (Active)   Wound Image   08/27/24 1508   Wound Etiology Venous 08/27/24 1508   Wound Cleansed Cleansed with saline 08/27/24 1508   Wound Length (cm) 2 cm 08/27/24 1508   Wound Width (cm) 2.6 cm 08/27/24 1508   Wound Depth (cm) 0.1 cm 08/27/24 1508   Wound Surface Area (cm^2) 5.2 cm^2 08/27/24 1508   Wound Volume (cm^3) 0.52 cm^3 08/27/24 1508   Wound Assessment Slough;Pink/red 08/27/24 1508   Drainage Amount Copious (>75 % saturated) 08/27/24 1508   Drainage Description Serosanguinous 08/27/24 1508   Odor Mild 08/27/24 1508   Radha-wound Assessment Dry/flaky;Edematous 08/27/24 1508   Margins Defined edges 08/27/24 1508   Wound Thickness Description not for Pressure Injury Full thickness 08/27/24 1508   Number of days: 0             Percent of Wound/Ulcer Debrided: 100%    Total Surface Area Debrided:  85 sq cm     Diabetic/Pressure/Non Pressure Ulcers:  Ulcer: Non-Pressure ulcer, fat layer exposed      Bleeding:  Minimal    Hemostasis Achieved:  by pressure    Procedural Pain:  1  / 10     Post Procedural Pain:  1

## 2024-09-10 ENCOUNTER — HOSPITAL ENCOUNTER (OUTPATIENT)
Dept: WOUND CARE | Age: 54
Discharge: HOME | End: 2024-09-10
Attending: STUDENT IN AN ORGANIZED HEALTH CARE EDUCATION/TRAINING PROGRAM
Payer: COMMERCIAL

## 2024-09-10 VITALS
TEMPERATURE: 96.3 F | HEART RATE: 95 BPM | DIASTOLIC BLOOD PRESSURE: 96 MMHG | SYSTOLIC BLOOD PRESSURE: 166 MMHG | RESPIRATION RATE: 20 BRPM

## 2024-09-10 DIAGNOSIS — L97.221 VENOUS STASIS ULCER OF LEFT CALF LIMITED TO BREAKDOWN OF SKIN WITH VARICOSE VEINS (HCC): ICD-10-CM

## 2024-09-10 DIAGNOSIS — I89.0 LYMPH EDEMA: Primary | ICD-10-CM

## 2024-09-10 DIAGNOSIS — I83.022 VENOUS STASIS ULCER OF LEFT CALF LIMITED TO BREAKDOWN OF SKIN WITH VARICOSE VEINS (HCC): ICD-10-CM

## 2024-09-10 DIAGNOSIS — N39.0 SEPSIS DUE TO URINARY TRACT INFECTION (HCC): ICD-10-CM

## 2024-09-10 DIAGNOSIS — L03.116 BILATERAL LOWER LEG CELLULITIS: ICD-10-CM

## 2024-09-10 DIAGNOSIS — E66.01 MORBID OBESITY DUE TO EXCESS CALORIES (HCC): ICD-10-CM

## 2024-09-10 DIAGNOSIS — L03.115 BILATERAL LOWER LEG CELLULITIS: ICD-10-CM

## 2024-09-10 DIAGNOSIS — A41.9 SEPSIS DUE TO URINARY TRACT INFECTION (HCC): ICD-10-CM

## 2024-09-10 DIAGNOSIS — I87.2 VENOUS INSUFFICIENCY OF BOTH LOWER EXTREMITIES: ICD-10-CM

## 2024-09-10 PROCEDURE — 11043 DBRDMT MUSC&/FSCA 1ST 20/<: CPT

## 2024-09-10 PROCEDURE — 11046 DBRDMT MUSC&/FSCA EA ADDL: CPT

## 2024-09-10 RX ORDER — LIDOCAINE HYDROCHLORIDE 40 MG/ML
SOLUTION TOPICAL ONCE
OUTPATIENT
Start: 2024-09-10 | End: 2024-09-10

## 2024-09-10 RX ORDER — MUPIROCIN 20 MG/G
OINTMENT TOPICAL ONCE
OUTPATIENT
Start: 2024-09-10 | End: 2024-09-10

## 2024-09-10 RX ORDER — BETAMETHASONE DIPROPIONATE 0.5 MG/G
CREAM TOPICAL ONCE
OUTPATIENT
Start: 2024-09-10 | End: 2024-09-10

## 2024-09-10 RX ORDER — SILVER SULFADIAZINE 10 MG/G
CREAM TOPICAL ONCE
OUTPATIENT
Start: 2024-09-10 | End: 2024-09-10

## 2024-09-10 RX ORDER — LIDOCAINE 40 MG/G
CREAM TOPICAL ONCE
OUTPATIENT
Start: 2024-09-10 | End: 2024-09-10

## 2024-09-10 RX ORDER — TRIAMCINOLONE ACETONIDE 1 MG/G
OINTMENT TOPICAL ONCE
OUTPATIENT
Start: 2024-09-10 | End: 2024-09-10

## 2024-09-10 RX ORDER — LIDOCAINE 50 MG/G
OINTMENT TOPICAL ONCE
OUTPATIENT
Start: 2024-09-10 | End: 2024-09-10

## 2024-09-10 RX ORDER — BACITRACIN ZINC AND POLYMYXIN B SULFATE 500; 1000 [USP'U]/G; [USP'U]/G
OINTMENT TOPICAL ONCE
OUTPATIENT
Start: 2024-09-10 | End: 2024-09-10

## 2024-09-10 RX ORDER — NEOMYCIN/BACITRACIN/POLYMYXINB 3.5-400-5K
OINTMENT (GRAM) TOPICAL ONCE
OUTPATIENT
Start: 2024-09-10 | End: 2024-09-10

## 2024-09-10 RX ORDER — GENTAMICIN SULFATE 1 MG/G
OINTMENT TOPICAL ONCE
OUTPATIENT
Start: 2024-09-10 | End: 2024-09-10

## 2024-09-10 RX ORDER — LIDOCAINE HYDROCHLORIDE 20 MG/ML
JELLY TOPICAL ONCE
OUTPATIENT
Start: 2024-09-10 | End: 2024-09-10

## 2024-09-10 RX ORDER — TRAMADOL HYDROCHLORIDE 50 MG/1
50 TABLET ORAL EVERY 6 HOURS PRN
Qty: 20 TABLET | Refills: 0 | Status: SHIPPED | OUTPATIENT
Start: 2024-09-10 | End: 2024-09-15

## 2024-09-10 RX ORDER — TRIAMCINOLONE ACETONIDE 1 MG/G
OINTMENT TOPICAL ONCE
Status: DISCONTINUED | OUTPATIENT
Start: 2024-09-10 | End: 2024-09-11 | Stop reason: HOSPADM

## 2024-09-10 RX ORDER — SODIUM CHLOR/HYPOCHLOROUS ACID 0.033 %
SOLUTION, IRRIGATION IRRIGATION ONCE
OUTPATIENT
Start: 2024-09-10 | End: 2024-09-10

## 2024-09-10 RX ORDER — GINSENG 100 MG
CAPSULE ORAL ONCE
OUTPATIENT
Start: 2024-09-10 | End: 2024-09-10

## 2024-09-10 RX ORDER — CLOBETASOL PROPIONATE 0.5 MG/G
OINTMENT TOPICAL ONCE
OUTPATIENT
Start: 2024-09-10 | End: 2024-09-10

## 2024-09-10 ASSESSMENT — PAIN DESCRIPTION - FREQUENCY: FREQUENCY: CONTINUOUS

## 2024-09-10 ASSESSMENT — PAIN DESCRIPTION - ORIENTATION: ORIENTATION: LEFT

## 2024-09-10 ASSESSMENT — PAIN DESCRIPTION - DESCRIPTORS: DESCRIPTORS: ACHING

## 2024-09-10 ASSESSMENT — PAIN SCALES - GENERAL: PAINLEVEL_OUTOF10: 4

## 2024-09-10 ASSESSMENT — PAIN DESCRIPTION - LOCATION: LOCATION: LEG

## 2024-10-01 ENCOUNTER — HOSPITAL ENCOUNTER (OUTPATIENT)
Dept: WOUND CARE | Age: 54
Discharge: HOME | End: 2024-10-01
Attending: STUDENT IN AN ORGANIZED HEALTH CARE EDUCATION/TRAINING PROGRAM
Payer: COMMERCIAL

## 2024-10-01 VITALS
TEMPERATURE: 96.8 F | DIASTOLIC BLOOD PRESSURE: 77 MMHG | HEART RATE: 99 BPM | SYSTOLIC BLOOD PRESSURE: 154 MMHG | RESPIRATION RATE: 20 BRPM

## 2024-10-01 DIAGNOSIS — I89.0 LYMPH EDEMA: Primary | ICD-10-CM

## 2024-10-01 DIAGNOSIS — E66.01 MORBID OBESITY DUE TO EXCESS CALORIES: ICD-10-CM

## 2024-10-01 DIAGNOSIS — I83.022 VENOUS STASIS ULCER OF LEFT CALF LIMITED TO BREAKDOWN OF SKIN WITH VARICOSE VEINS (HCC): ICD-10-CM

## 2024-10-01 DIAGNOSIS — L97.221 VENOUS STASIS ULCER OF LEFT CALF LIMITED TO BREAKDOWN OF SKIN WITH VARICOSE VEINS (HCC): ICD-10-CM

## 2024-10-01 PROCEDURE — 11045 DBRDMT SUBQ TISS EACH ADDL: CPT

## 2024-10-01 PROCEDURE — 11042 DBRDMT SUBQ TIS 1ST 20SQCM/<: CPT

## 2024-10-01 RX ORDER — TRIAMCINOLONE ACETONIDE 1 MG/G
OINTMENT TOPICAL ONCE
OUTPATIENT
Start: 2024-10-01 | End: 2024-10-01

## 2024-10-01 RX ORDER — LIDOCAINE HYDROCHLORIDE 20 MG/ML
JELLY TOPICAL ONCE
OUTPATIENT
Start: 2024-10-01 | End: 2024-10-01

## 2024-10-01 RX ORDER — LIDOCAINE HYDROCHLORIDE 40 MG/ML
SOLUTION TOPICAL ONCE
OUTPATIENT
Start: 2024-10-01 | End: 2024-10-01

## 2024-10-01 RX ORDER — LIDOCAINE 40 MG/G
CREAM TOPICAL ONCE
OUTPATIENT
Start: 2024-10-01 | End: 2024-10-01

## 2024-10-01 RX ORDER — GINSENG 100 MG
CAPSULE ORAL ONCE
OUTPATIENT
Start: 2024-10-01 | End: 2024-10-01

## 2024-10-01 RX ORDER — MUPIROCIN 20 MG/G
OINTMENT TOPICAL ONCE
OUTPATIENT
Start: 2024-10-01 | End: 2024-10-01

## 2024-10-01 RX ORDER — BETAMETHASONE DIPROPIONATE 0.5 MG/G
CREAM TOPICAL ONCE
OUTPATIENT
Start: 2024-10-01 | End: 2024-10-01

## 2024-10-01 RX ORDER — CLOBETASOL PROPIONATE 0.5 MG/G
OINTMENT TOPICAL ONCE
OUTPATIENT
Start: 2024-10-01 | End: 2024-10-01

## 2024-10-01 RX ORDER — GENTAMICIN SULFATE 1 MG/G
OINTMENT TOPICAL ONCE
OUTPATIENT
Start: 2024-10-01 | End: 2024-10-01

## 2024-10-01 RX ORDER — SILVER SULFADIAZINE 10 MG/G
CREAM TOPICAL ONCE
OUTPATIENT
Start: 2024-10-01 | End: 2024-10-01

## 2024-10-01 RX ORDER — LIDOCAINE 50 MG/G
OINTMENT TOPICAL ONCE
OUTPATIENT
Start: 2024-10-01 | End: 2024-10-01

## 2024-10-01 RX ORDER — BACITRACIN ZINC AND POLYMYXIN B SULFATE 500; 1000 [USP'U]/G; [USP'U]/G
OINTMENT TOPICAL ONCE
OUTPATIENT
Start: 2024-10-01 | End: 2024-10-01

## 2024-10-01 RX ORDER — SODIUM CHLOR/HYPOCHLOROUS ACID 0.033 %
SOLUTION, IRRIGATION IRRIGATION ONCE
OUTPATIENT
Start: 2024-10-01 | End: 2024-10-01

## 2024-10-01 RX ORDER — NEOMYCIN/BACITRACIN/POLYMYXINB 3.5-400-5K
OINTMENT (GRAM) TOPICAL ONCE
OUTPATIENT
Start: 2024-10-01 | End: 2024-10-01

## 2024-10-01 RX ORDER — TRIAMCINOLONE ACETONIDE 1 MG/G
OINTMENT TOPICAL ONCE
Status: DISCONTINUED | OUTPATIENT
Start: 2024-10-01 | End: 2024-10-02 | Stop reason: HOSPADM

## 2024-10-01 NOTE — DISCHARGE INSTRUCTIONS
AVAILABLE UNTIL YOU ARE ABLE TO REACH US. IT IS MOST IMPORTANT TO KEEP THE WOUND COVERED AT ALL TIMES    Electronically signed by SHILPA Courtney CNP on 10/1/2024 at 2:42 PM

## 2024-10-01 NOTE — PROGRESS NOTES
3. Then apply coban from base of toes to below the knee.   4. Change twice weekly           Compression:.   unna boots     Offloading Device:     Other Instructions: You can continue to use topical Triamcinolone cream(use around the wound bed - not in wound bed)  Elevate lower extremities as high and as much as possible. Use a good cream and apply to the dry areas on your feet 2x a day.(Not between toes).      Keep all dressings clean, dry and intact.  Keep pressure off the wound(s) at all times.      Follow up visit: 3 weeks October 22 , 2024 @  2:00pm     Please give 24 hour notice if unable to keep appointment. [phone]     If you experience any of the following, please call the Wound Care Service at  [phone] or go to the nearest emergency room.        *Increase in pain         *Temperature over 101           *Increase in drainage from your wound or a foul odor  *Uncontrolled swelling            *Need for compression bandage changes due to slippage, breakthrough drainage       PLEASE NOTE: IF YOU ARE UNABLE TO OBTAIN WOUND SUPPLIES, CONTINUE TO USE THE SUPPLIES YOU HAVE AVAILABLE UNTIL YOU ARE ABLE TO REACH US. IT IS MOST IMPORTANT TO KEEP THE WOUND COVERED AT ALL TIMES    Electronically signed by SHILPA Courtney CNP on 10/1/2024 at 2:42 PM             Electronically signed by SHILPA Courtney CNP on 10/1/2024 at 2:42 PM

## 2024-10-01 NOTE — FLOWSHEET NOTE
Unna Boot Application   Below Knee    NAME:  Ana Laura Gongora  YOB: 1970  MEDICAL RECORD NUMBER:  [id number]  DATE:  10/1/2024    Unna boot: Applied moisturizing agent to dry skin as needed.   Appied primary and secondary dressing as ordered.  Applied Unna roll from toes to knee overlapping each time.   Applied ace wrap or coban from toes to below the knee.   Secured with tape and/or metal clips covered with tape.   Instructed patient/caregiver to keep dressing dry and intact. DO NOT REMOVE DRESSING.   Instructed pt/family/caregiver to report excessive draining, loose bandage, wet dressing, severe pain or tingling in toes.  Applied Unna Boot dressing below the knee to right lower leg.  Applied Unna Boot dressing below the knee to left lower leg.    Unna Boot(s) were applied per  Guidelines.     Electronically signed by La Hendrickson RN on 10/1/2024 at 2:39 PM

## 2024-10-10 ENCOUNTER — HOSPITAL ENCOUNTER (EMERGENCY)
Age: 54
Discharge: HOME | End: 2024-10-11
Payer: COMMERCIAL

## 2024-10-10 ENCOUNTER — APPOINTMENT (OUTPATIENT)
Dept: GENERAL RADIOLOGY | Age: 54
End: 2024-10-10
Payer: COMMERCIAL

## 2024-10-10 VITALS
HEART RATE: 90 BPM | TEMPERATURE: 97.8 F | HEIGHT: 64 IN | OXYGEN SATURATION: 94 % | WEIGHT: 293 LBS | SYSTOLIC BLOOD PRESSURE: 164 MMHG | RESPIRATION RATE: 18 BRPM | BODY MASS INDEX: 50.02 KG/M2 | DIASTOLIC BLOOD PRESSURE: 78 MMHG

## 2024-10-10 DIAGNOSIS — I89.0 LYMPHEDEMA: ICD-10-CM

## 2024-10-10 DIAGNOSIS — S81.802A MULTIPLE OPEN WOUNDS OF LEFT LOWER EXTREMITY, INITIAL ENCOUNTER: ICD-10-CM

## 2024-10-10 DIAGNOSIS — E66.01 MORBID OBESITY WITH BMI OF 70 AND OVER, ADULT: Primary | ICD-10-CM

## 2024-10-10 LAB
ALBUMIN SERPL-MCNC: 3.3 G/DL (ref 3.5–4.6)
ALP SERPL-CCNC: 68 U/L (ref 40–130)
ALT SERPL-CCNC: <5 U/L (ref 0–33)
ANION GAP SERPL CALCULATED.3IONS-SCNC: 9 MEQ/L (ref 9–15)
AST SERPL-CCNC: 7 U/L (ref 0–35)
BASOPHILS # BLD: 0 K/UL (ref 0–0.2)
BASOPHILS NFR BLD: 0.2 %
BILIRUB SERPL-MCNC: 0.5 MG/DL (ref 0.2–0.7)
BUN SERPL-MCNC: 16 MG/DL (ref 6–20)
CALCIUM SERPL-MCNC: 9.1 MG/DL (ref 8.5–9.9)
CHLORIDE SERPL-SCNC: 98 MEQ/L (ref 95–107)
CHOLEST SERPL-MCNC: 132 MG/DL (ref 0–199)
CO2 SERPL-SCNC: 28 MEQ/L (ref 20–31)
CREAT SERPL-MCNC: 0.55 MG/DL (ref 0.5–0.9)
EOSINOPHIL # BLD: 0.1 K/UL (ref 0–0.7)
EOSINOPHIL NFR BLD: 1.2 %
ERYTHROCYTE [DISTWIDTH] IN BLOOD BY AUTOMATED COUNT: 14.8 % (ref 11.5–14.5)
GLOBULIN SER CALC-MCNC: 4.1 G/DL (ref 2.3–3.5)
GLUCOSE SERPL-MCNC: 107 MG/DL (ref 70–99)
HCT VFR BLD AUTO: 47.9 % (ref 37–47)
HDLC SERPL-MCNC: 44 MG/DL (ref 40–59)
HGB BLD-MCNC: 14.6 G/DL (ref 12–16)
LDLC SERPL CALC-MCNC: 72 MG/DL (ref 0–129)
LYMPHOCYTES # BLD: 1.6 K/UL (ref 1–4.8)
LYMPHOCYTES NFR BLD: 16.7 %
MCH RBC QN AUTO: 25 PG (ref 27–31.3)
MCHC RBC AUTO-ENTMCNC: 30.5 % (ref 33–37)
MCV RBC AUTO: 82.2 FL (ref 79.4–94.8)
MONOCYTES # BLD: 0.7 K/UL (ref 0.2–0.8)
MONOCYTES NFR BLD: 7.8 %
NEUTROPHILS # BLD: 7.1 K/UL (ref 1.4–6.5)
NEUTS SEG NFR BLD: 73.8 %
PLATELET # BLD AUTO: 164 K/UL (ref 130–400)
POTASSIUM SERPL-SCNC: 4.8 MEQ/L (ref 3.4–4.9)
PROT SERPL-MCNC: 7.4 G/DL (ref 6.3–8)
RBC # BLD AUTO: 5.83 M/UL (ref 4.2–5.4)
SODIUM SERPL-SCNC: 135 MEQ/L (ref 135–144)
TRIGL SERPL-MCNC: 81 MG/DL (ref 0–150)
TSH SERPL-MCNC: 3.95 UIU/ML (ref 0.44–3.86)
WBC # BLD AUTO: 9.5 K/UL (ref 4.8–10.8)

## 2024-10-10 PROCEDURE — 80053 COMPREHEN METABOLIC PANEL: CPT

## 2024-10-10 PROCEDURE — 80061 LIPID PANEL: CPT

## 2024-10-10 PROCEDURE — 82607 VITAMIN B-12: CPT

## 2024-10-10 PROCEDURE — 83036 HEMOGLOBIN GLYCOSYLATED A1C: CPT

## 2024-10-10 PROCEDURE — 82306 VITAMIN D 25 HYDROXY: CPT

## 2024-10-10 PROCEDURE — 73590 X-RAY EXAM OF LOWER LEG: CPT

## 2024-10-10 PROCEDURE — 85025 COMPLETE CBC W/AUTO DIFF WBC: CPT

## 2024-10-10 PROCEDURE — 99284 EMERGENCY DEPT VISIT MOD MDM: CPT

## 2024-10-10 PROCEDURE — 36415 COLL VENOUS BLD VENIPUNCTURE: CPT

## 2024-10-10 PROCEDURE — 84443 ASSAY THYROID STIM HORMONE: CPT

## 2024-10-10 ASSESSMENT — PAIN DESCRIPTION - DESCRIPTORS: DESCRIPTORS: DISCOMFORT

## 2024-10-10 ASSESSMENT — PAIN SCALES - GENERAL: PAINLEVEL_OUTOF10: 3

## 2024-10-10 ASSESSMENT — PAIN - FUNCTIONAL ASSESSMENT: PAIN_FUNCTIONAL_ASSESSMENT: 0-10

## 2024-10-10 ASSESSMENT — LIFESTYLE VARIABLES
HOW MANY STANDARD DRINKS CONTAINING ALCOHOL DO YOU HAVE ON A TYPICAL DAY: PATIENT DOES NOT DRINK
HOW OFTEN DO YOU HAVE A DRINK CONTAINING ALCOHOL: NEVER

## 2024-10-10 ASSESSMENT — PAIN DESCRIPTION - ORIENTATION: ORIENTATION: LEFT

## 2024-10-10 ASSESSMENT — ENCOUNTER SYMPTOMS: CHEST TIGHTNESS: 0

## 2024-10-10 ASSESSMENT — PAIN DESCRIPTION - LOCATION: LOCATION: LEG

## 2024-10-11 LAB
ESTIMATED AVERAGE GLUCOSE: 123 MG/DL
HBA1C MFR BLD: 5.9 % (ref 4–6)
VITAMIN B-12: 473 PG/ML (ref 232–1245)
VITAMIN D 25-HYDROXY: 27.2 NG/ML (ref 30–100)

## 2024-10-11 ASSESSMENT — PAIN - FUNCTIONAL ASSESSMENT: PAIN_FUNCTIONAL_ASSESSMENT: NONE - DENIES PAIN

## 2024-10-11 NOTE — ED PROVIDER NOTES
[facility] ED  eMERGENCYdEPARTMENT eNCOUnter        Pt Name: Ana Laura Gongora  MRN: [id number]  Birthdate 1970of evaluation: 10/10/2024  Provider:Dusty Carbajal PA-C  11:01 PM EDT    CHIEF COMPLAINT       Chief Complaint   Patient presents with    Wound Check         HISTORY OF PRESENT ILLNESS  (Location/Symptom, Timing/Onset, Context/Setting, Quality, Duration, Modifying Factors, Severity.)   Ana Laura Gongora is a 54 y.o. female who presents to the emergency department after removing an Unna boot to her left leg and noticing maggots in the boot.  Patient has chronic lymphedema and lower extremity wounds that are currently being treated by wound care.  Patient states that she has had Unna boots on since Monday and when removing them today she noticed maggots coming out of the boot.  Patient denies any fevers or chills.  She is currently on doxycycline for a sinus infection but is not on any other current antibiotics.  Patient does state that her legs look improved from what they typically look like.  She denies any chest pain or shortness of breath.    HPI    Nursing Notes were reviewed and I agree.    REVIEW OF SYSTEMS    (2-9 systems for level 4, 10 or more for level 5)     Review of Systems   Constitutional:  Negative for chills and fever.   Respiratory:  Negative for chest tightness.    Cardiovascular:  Negative for chest pain.   Skin:  Positive for wound.   All other systems reviewed and are negative.       as noted above the remainder of the review of systems was reviewed and negative.       PAST MEDICAL HISTORY     Past Medical History:   Diagnosis Date    Morbid obesity     Overactive bladder     Psychiatric problem          SURGICAL HISTORY       Past Surgical History:   Procedure Laterality Date    ABDOMEN SURGERY      gastric bypass surgery    BACK SURGERY      CHOLECYSTECTOMY           CURRENT MEDICATIONS       Previous Medications    ACETAMINOPHEN (TYLENOL) 500 MG TABLET    Take 2 tablets by mouth 3  B12   VITAMIN D 25 HYDROXY   HEMOGLOBIN A1C       All other labs were within normal range or not returnedas of this dictation.    EMERGENCYDEPARTMENT COURSE and DIFFERENTIAL DIAGNOSIS/MDM:   Vitals:    Vitals:    10/10/24 2059   BP: (!) 164/78   Pulse: 90   Resp: 18   Temp: 97.8 °F (36.6 °C)   TempSrc: Oral   SpO2: 94%   Weight: (!) 254 kg (560 lb)   Height: 1.626 m (5' 4\")       REASSESSMENT        Patient presented the emergency department with concerns due to finding maggots on her lower extremity on the left after removing an Unna boot.  Wounds were examined thoroughly and 4 maggots were removed from the external surface of the leg with no residual the patient has no significant deep wounds.  No signs of active cellulitis and laboratory testing unremarkable.  Patient will be discharged home to follow-up with wound care and PCP    Medical Decision Making  Amount and/or Complexity of Data Reviewed  Labs: ordered.  Radiology: ordered.       Coding     PROCEDURES:    Procedures      FINAL IMPRESSION      1. Morbid obesity with BMI of 70 and over, adult    2. Lymphedema    3. Multiple open wounds of left lower extremity, initial encounter          DISPOSITION/PLAN   DISPOSITION Discharge - Pending Orders Complete 10/11/2024 12:03:13 AM      PATIENT REFERRED TO:  [facility]  [address] [address] [id number] [phone]  Call in 1 day      Alexa Emerson DO  [id number] [facility] [id number] [phone]    Call in 1 day        DISCHARGE MEDICATIONS:  New Prescriptions    No medications on file       (Please note that portions of this note were completed with a voice recognition program.  Efforts were made to edit the dictations but occasionally words are mis-transcribed.)    Dusty Carbajal PA-C    Supervising Physician Dusty Vo PA-C  10/11/24 0005

## 2024-10-11 NOTE — ED NOTES
Pt presents to ED via private vehicle.   PT is currently being treated for wounds on BLE. Pt states that she has been wearing compressive boots on her legs since Monday. Pt states that upon taking them off today she notices maggots on her wounds.     Pt has open wounds to the BLE, bugs were removed by provider.

## 2024-10-11 NOTE — ED TRIAGE NOTES
L leg wound per pt has lucero  Has bilateral lower extremity wounds  Follows up with wound care through [facility]  Has had wounds about 6 weeks now

## 2024-11-05 ENCOUNTER — APPOINTMENT (OUTPATIENT)
Dept: GENERAL RADIOLOGY | Age: 54
End: 2024-11-05
Payer: COMMERCIAL

## 2024-11-05 ENCOUNTER — HOSPITAL ENCOUNTER (EMERGENCY)
Age: 54
Discharge: ANOTHER HOSPITAL | End: 2024-11-06
Attending: EMERGENCY MEDICINE
Payer: COMMERCIAL

## 2024-11-05 DIAGNOSIS — R09.02 HYPOXEMIA: ICD-10-CM

## 2024-11-05 DIAGNOSIS — L08.9 WOUND INFECTION: Primary | ICD-10-CM

## 2024-11-05 DIAGNOSIS — R06.00 DYSPNEA AND RESPIRATORY ABNORMALITIES: ICD-10-CM

## 2024-11-05 DIAGNOSIS — E66.01 MORBID OBESITY: ICD-10-CM

## 2024-11-05 DIAGNOSIS — R06.89 DYSPNEA AND RESPIRATORY ABNORMALITIES: ICD-10-CM

## 2024-11-05 DIAGNOSIS — T14.8XXA WOUND INFECTION: Primary | ICD-10-CM

## 2024-11-05 LAB
ALBUMIN SERPL-MCNC: 3.6 G/DL (ref 3.5–4.6)
ALP SERPL-CCNC: 75 U/L (ref 40–130)
ALT SERPL-CCNC: <5 U/L (ref 0–33)
ANION GAP SERPL CALCULATED.3IONS-SCNC: 6 MEQ/L (ref 9–15)
AST SERPL-CCNC: 14 U/L (ref 0–35)
BACTERIA URNS QL MICRO: ABNORMAL /HPF
BASOPHILS # BLD: 0 K/UL (ref 0–0.1)
BASOPHILS NFR BLD: 0.5 % (ref 0.1–1.2)
BILIRUB SERPL-MCNC: 0.7 MG/DL (ref 0.2–0.7)
BILIRUB UR QL STRIP: NEGATIVE
BNP BLD-MCNC: 288 PG/ML
BUN SERPL-MCNC: 13 MG/DL (ref 6–20)
CALCIUM SERPL-MCNC: 9.1 MG/DL (ref 8.5–9.9)
CHLORIDE SERPL-SCNC: 99 MEQ/L (ref 95–107)
CLARITY UR: CLEAR
CO2 SERPL-SCNC: 36 MEQ/L (ref 20–31)
COLOR UR: YELLOW
CREAT SERPL-MCNC: 0.56 MG/DL (ref 0.5–0.9)
EOSINOPHIL # BLD: 0.2 K/UL (ref 0–0.4)
EOSINOPHIL NFR BLD: 2.3 % (ref 0.7–5.8)
EPI CELLS #/AREA URNS HPF: ABNORMAL /HPF
ERYTHROCYTE [DISTWIDTH] IN BLOOD BY AUTOMATED COUNT: 15.3 % (ref 11.7–14.4)
GLOBULIN SER CALC-MCNC: 4 G/DL (ref 2.3–3.5)
GLUCOSE SERPL-MCNC: 122 MG/DL (ref 70–99)
GLUCOSE UR STRIP-MCNC: NEGATIVE MG/DL
HCT VFR BLD AUTO: 48.3 % (ref 37–47)
HGB BLD-MCNC: 13.9 G/DL (ref 11.2–15.7)
HGB UR QL STRIP: NORMAL
IMM GRANULOCYTES # BLD: 0 K/UL
IMM GRANULOCYTES NFR BLD: 0.3 %
INFLUENZA A BY PCR: NEGATIVE
INFLUENZA B BY PCR: NEGATIVE
KETONES UR STRIP-MCNC: NEGATIVE MG/DL
LACTATE BLDV-SCNC: 2.4 MMOL/L (ref 0.5–2.2)
LEUKOCYTE ESTERASE UR QL STRIP: NORMAL
LYMPHOCYTES # BLD: 1.4 K/UL (ref 1.2–3.7)
LYMPHOCYTES NFR BLD: 19.3 %
MAGNESIUM SERPL-MCNC: 1.7 MG/DL (ref 1.7–2.4)
MCH RBC QN AUTO: 24.8 PG (ref 25.6–32.2)
MCHC RBC AUTO-ENTMCNC: 28.8 % (ref 32.2–35.5)
MCV RBC AUTO: 86.3 FL (ref 79.4–94.8)
MONOCYTES # BLD: 0.6 K/UL (ref 0.2–0.9)
MONOCYTES NFR BLD: 7.9 % (ref 4.7–12.5)
NEUTROPHILS # BLD: 5.2 K/UL (ref 1.6–6.1)
NEUTS SEG NFR BLD: 69.7 % (ref 34–71.1)
NITRITE UR QL STRIP: POSITIVE
PH UR STRIP: 7 [PH] (ref 5–9)
PLATELET # BLD AUTO: 133 K/UL (ref 182–369)
POTASSIUM SERPL-SCNC: 4.2 MEQ/L (ref 3.4–4.9)
PROT SERPL-MCNC: 7.6 G/DL (ref 6.3–8)
PROT UR STRIP-MCNC: NEGATIVE MG/DL
RBC # BLD AUTO: 5.6 M/UL (ref 3.93–5.22)
RBC #/AREA URNS HPF: ABNORMAL /HPF (ref 0–2)
SARS-COV-2 RDRP RESP QL NAA+PROBE: NOT DETECTED
SODIUM SERPL-SCNC: 141 MEQ/L (ref 135–144)
SP GR UR STRIP: 1.02 (ref 1–1.03)
STREP GRP A PCR: NEGATIVE
TROPONIN, HIGH SENSITIVITY: <6 NG/L (ref 0–19)
TROPONIN, HIGH SENSITIVITY: <6 NG/L (ref 0–19)
UROBILINOGEN UR STRIP-ACNC: 1 E.U./DL
WBC # BLD AUTO: 7.5 K/UL (ref 4–10)
WBC #/AREA URNS HPF: ABNORMAL /HPF (ref 0–5)

## 2024-11-05 PROCEDURE — 85014 HEMATOCRIT: CPT

## 2024-11-05 PROCEDURE — 83880 ASSAY OF NATRIURETIC PEPTIDE: CPT

## 2024-11-05 PROCEDURE — 84295 ASSAY OF SERUM SODIUM: CPT

## 2024-11-05 PROCEDURE — 82330 ASSAY OF CALCIUM: CPT

## 2024-11-05 PROCEDURE — 87651 STREP A DNA AMP PROBE: CPT

## 2024-11-05 PROCEDURE — 85025 COMPLETE CBC W/AUTO DIFF WBC: CPT

## 2024-11-05 PROCEDURE — 83605 ASSAY OF LACTIC ACID: CPT

## 2024-11-05 PROCEDURE — 36600 WITHDRAWAL OF ARTERIAL BLOOD: CPT

## 2024-11-05 PROCEDURE — 84132 ASSAY OF SERUM POTASSIUM: CPT

## 2024-11-05 PROCEDURE — 36415 COLL VENOUS BLD VENIPUNCTURE: CPT

## 2024-11-05 PROCEDURE — 96365 THER/PROPH/DIAG IV INF INIT: CPT

## 2024-11-05 PROCEDURE — 6360000002 HC RX W HCPCS: Performed by: EMERGENCY MEDICINE

## 2024-11-05 PROCEDURE — 71045 X-RAY EXAM CHEST 1 VIEW: CPT

## 2024-11-05 PROCEDURE — 96366 THER/PROPH/DIAG IV INF ADDON: CPT

## 2024-11-05 PROCEDURE — 80053 COMPREHEN METABOLIC PANEL: CPT

## 2024-11-05 PROCEDURE — 93005 ELECTROCARDIOGRAM TRACING: CPT

## 2024-11-05 PROCEDURE — 81001 URINALYSIS AUTO W/SCOPE: CPT

## 2024-11-05 PROCEDURE — 99285 EMERGENCY DEPT VISIT HI MDM: CPT

## 2024-11-05 PROCEDURE — 87635 SARS-COV-2 COVID-19 AMP PRB: CPT

## 2024-11-05 PROCEDURE — 82803 BLOOD GASES ANY COMBINATION: CPT

## 2024-11-05 PROCEDURE — 87040 BLOOD CULTURE FOR BACTERIA: CPT

## 2024-11-05 PROCEDURE — 82565 ASSAY OF CREATININE: CPT

## 2024-11-05 PROCEDURE — 73590 X-RAY EXAM OF LOWER LEG: CPT

## 2024-11-05 PROCEDURE — 82435 ASSAY OF BLOOD CHLORIDE: CPT

## 2024-11-05 PROCEDURE — 87502 INFLUENZA DNA AMP PROBE: CPT

## 2024-11-05 PROCEDURE — 84484 ASSAY OF TROPONIN QUANT: CPT

## 2024-11-05 PROCEDURE — 83735 ASSAY OF MAGNESIUM: CPT

## 2024-11-05 RX ORDER — LEVOFLOXACIN 5 MG/ML
500 INJECTION, SOLUTION INTRAVENOUS ONCE
Status: COMPLETED | OUTPATIENT
Start: 2024-11-05 | End: 2024-11-06

## 2024-11-05 RX ORDER — FUROSEMIDE 10 MG/ML
80 INJECTION INTRAMUSCULAR; INTRAVENOUS ONCE
Status: DISCONTINUED | OUTPATIENT
Start: 2024-11-05 | End: 2024-11-06 | Stop reason: HOSPADM

## 2024-11-05 RX ADMIN — LEVOFLOXACIN 500 MG: 5 INJECTION, SOLUTION INTRAVENOUS at 17:28

## 2024-11-05 ASSESSMENT — ENCOUNTER SYMPTOMS
EYE REDNESS: 0
SINUS PAIN: 0
ABDOMINAL PAIN: 0
SHORTNESS OF BREATH: 1
SORE THROAT: 0
EYE DISCHARGE: 0
COUGH: 1
SINUS PRESSURE: 1

## 2024-11-05 ASSESSMENT — LIFESTYLE VARIABLES
HOW OFTEN DO YOU HAVE A DRINK CONTAINING ALCOHOL: MONTHLY OR LESS
HOW MANY STANDARD DRINKS CONTAINING ALCOHOL DO YOU HAVE ON A TYPICAL DAY: 1 OR 2

## 2024-11-05 ASSESSMENT — PAIN - FUNCTIONAL ASSESSMENT: PAIN_FUNCTIONAL_ASSESSMENT: NONE - DENIES PAIN

## 2024-11-05 NOTE — ED NOTES
Patient comes to the ED from home for shortness of breath that started today and lower extremity wounds. Pt does not wear oxygen at home, she arrives with SpO2 87% on room air. She recently had a sinus infection which she received treatment for. She denies any productive cough. Denies any pain. Pt also states she has maggots in her left lower extremity wound. Pt does follow with wound care though she has missed her last few appointments. She states a home nurse comes when she cannot make it to her appts. States her wound dressing is changed every Tuesday and Friday. She just noticed the maggots today, is not sure how long they have been there but the nurse did not see them when changing her dressing at home. Pt is unable to get into the hospital bed at this time. LLE dressing removed. Pt placed on 3L NC with improvement in Sp)2 to 98%.

## 2024-11-05 NOTE — ED PROVIDER NOTES
was agreeable.  She remained hemodynamically stable through ED course.    Patient's care was earlier discussed with Dr. Perry by Dr. Grewal.    Patient's care was discussed with hospitalist at [facility] RN Dr. Ace who accepted patient in transfer for continuing care.   Vitals:    Vitals:    11/05/24 1645 11/05/24 1812 11/05/24 1944 11/05/24 2058   BP:  (!) 164/79 (!) 153/71 (!) 144/62   Pulse: 93 96 96 90   Resp:  20 24 24   Temp:       TempSrc:       SpO2: 98% 96% 99% 95%   Weight:       Height:         Treatment and course:Patient had an IV established, oxygen 2 L nasal cannula was applied with improvement of pulse ox.  Wound care was ordered to the left lower extremity.  Levaquin 500 mg IV was initiated.  Patient resting comfortably no distress maintaining pulse ox 96% on nasal cannula O2, discussed plan for transfer to [facility] for which patient is in agreement.    Coordination of care: A call was placed out to the hospitalist to discuss admission for further evaluation and care -Case was discussed with the hospitalist Dr. Perry, consider concerns with dyspnea hypoxia question of a component of vascular congestion/CHF on x-ray.  Concern with patient's size if respiratory decline would be difficult management here with limited resources    Coordination of care: A call was placed out to the hospitalist at [facility] to discuss transfer for further evaluation and care    FINAL IMPRESSION      1. Wound infection    2. Dyspnea and respiratory abnormalities    3. Hypoxemia    4. Morbid obesity          DISPOSITION/PLAN   DISPOSITION Decision To Transfer 11/05/2024 06:30:40 PM   Patient awaiting admission in stable condition      PATIENT REFERRED TO:  No follow-up provider specified.    DISCHARGE MEDICATIONS:  New Prescriptions    No medications on file     Controlled Substances Monitoring:          No data to display                (Please note that portions of this note were

## 2024-11-06 ENCOUNTER — HOSPITAL ENCOUNTER (INPATIENT)
Age: 54
LOS: 7 days | Discharge: NURSING, REHAB OR LONG-TERM CARE | DRG: 602 | End: 2024-11-13
Attending: INTERNAL MEDICINE | Admitting: INTERNAL MEDICINE
Payer: COMMERCIAL

## 2024-11-06 VITALS
SYSTOLIC BLOOD PRESSURE: 149 MMHG | WEIGHT: 293 LBS | OXYGEN SATURATION: 95 % | HEART RATE: 101 BPM | BODY MASS INDEX: 57.52 KG/M2 | HEIGHT: 60 IN | TEMPERATURE: 97.9 F | RESPIRATION RATE: 20 BRPM | DIASTOLIC BLOOD PRESSURE: 57 MMHG

## 2024-11-06 DIAGNOSIS — R06.02 SHORTNESS OF BREATH: Primary | ICD-10-CM

## 2024-11-06 PROBLEM — S81.802A OPEN LEG WOUND, LEFT, INITIAL ENCOUNTER: Status: ACTIVE | Noted: 2024-11-06

## 2024-11-06 PROBLEM — T14.8XXA WOUND INFECTION: Status: ACTIVE | Noted: 2024-11-06

## 2024-11-06 PROBLEM — L08.9 WOUND INFECTION: Status: ACTIVE | Noted: 2024-11-06

## 2024-11-06 LAB
B PARAP IS1001 DNA NPH QL NAA+NON-PROBE: NOT DETECTED
B PERT.PT PRMT NPH QL NAA+NON-PROBE: NOT DETECTED
BASE EXCESS ARTERIAL: 11
C PNEUM DNA NPH QL NAA+NON-PROBE: NOT DETECTED
CALCIUM IONIZED: 1.19 MMOL/L (ref 1.12–1.32)
FLUAV RNA NPH QL NAA+NON-PROBE: NOT DETECTED
FLUBV RNA NPH QL NAA+NON-PROBE: NOT DETECTED
GLUCOSE BLD-MCNC: 129 MG/DL (ref 70–99)
HADV DNA NPH QL NAA+NON-PROBE: NOT DETECTED
HCO3 ARTERIAL: 36.4 MMOL/L (ref 21–29)
HCOV 229E RNA NPH QL NAA+NON-PROBE: NOT DETECTED
HCOV HKU1 RNA NPH QL NAA+NON-PROBE: NOT DETECTED
HCOV NL63 RNA NPH QL NAA+NON-PROBE: NOT DETECTED
HCOV OC43 RNA NPH QL NAA+NON-PROBE: NOT DETECTED
HCT VFR BLD AUTO: 46 % (ref 36–48)
HGB BLD CALC-MCNC: 15.6 GM/DL (ref 12–16)
HMPV RNA NPH QL NAA+NON-PROBE: NOT DETECTED
HPIV1 RNA NPH QL NAA+NON-PROBE: NOT DETECTED
HPIV2 RNA NPH QL NAA+NON-PROBE: NOT DETECTED
HPIV3 RNA NPH QL NAA+NON-PROBE: NOT DETECTED
HPIV4 RNA NPH QL NAA+NON-PROBE: NOT DETECTED
LACTATE: 1.46 MMOL/L (ref 0.4–2)
M PNEUMO DNA NPH QL NAA+NON-PROBE: NOT DETECTED
O2 SAT, ARTERIAL: 94 % (ref 93–101)
PCO2 ARTERIAL: 64 MM HG (ref 35–45)
PERFORMED ON: ABNORMAL
PH ARTERIAL: 7.36 (ref 7.35–7.45)
PO2 ARTERIAL: 76 MM HG (ref 75–108)
POC CHLORIDE: 114 MEQ/L (ref 99–110)
POC CREATININE: 0.6 MG/DL (ref 0.6–1.2)
POC FIO2: 2
POC SAMPLE TYPE: ABNORMAL
POTASSIUM SERPL-SCNC: 3.8 MEQ/L (ref 3.5–5.1)
RSV RNA NPH QL NAA+NON-PROBE: NOT DETECTED
RV+EV RNA NPH QL NAA+NON-PROBE: NOT DETECTED
SARS-COV-2 RNA NPH QL NAA+NON-PROBE: NOT DETECTED
SODIUM BLD-SCNC: 141 MEQ/L (ref 136–145)
TCO2 ARTERIAL: 38 MMOL/L (ref 70–99)

## 2024-11-06 PROCEDURE — 6370000000 HC RX 637 (ALT 250 FOR IP): Performed by: INTERNAL MEDICINE

## 2024-11-06 PROCEDURE — 2580000003 HC RX 258: Performed by: INTERNAL MEDICINE

## 2024-11-06 PROCEDURE — 99222 1ST HOSP IP/OBS MODERATE 55: CPT | Performed by: INTERNAL MEDICINE

## 2024-11-06 PROCEDURE — 6360000002 HC RX W HCPCS: Performed by: INTERNAL MEDICINE

## 2024-11-06 PROCEDURE — 96366 THER/PROPH/DIAG IV INF ADDON: CPT

## 2024-11-06 PROCEDURE — 0202U NFCT DS 22 TRGT SARS-COV-2: CPT

## 2024-11-06 PROCEDURE — 99223 1ST HOSP IP/OBS HIGH 75: CPT | Performed by: INTERNAL MEDICINE

## 2024-11-06 PROCEDURE — 1210000000 HC MED SURG R&B

## 2024-11-06 PROCEDURE — 2500000003 HC RX 250 WO HCPCS: Performed by: INTERNAL MEDICINE

## 2024-11-06 RX ORDER — ALBUTEROL SULFATE 90 UG/1
2 INHALANT RESPIRATORY (INHALATION) EVERY 6 HOURS PRN
COMMUNITY

## 2024-11-06 RX ORDER — LEVOFLOXACIN 5 MG/ML
750 INJECTION, SOLUTION INTRAVENOUS EVERY 24 HOURS
Status: DISCONTINUED | OUTPATIENT
Start: 2024-11-06 | End: 2024-11-06

## 2024-11-06 RX ORDER — ENOXAPARIN SODIUM 100 MG/ML
60 INJECTION SUBCUTANEOUS 2 TIMES DAILY
Status: DISCONTINUED | OUTPATIENT
Start: 2024-11-06 | End: 2024-11-13 | Stop reason: HOSPADM

## 2024-11-06 RX ORDER — POLYETHYLENE GLYCOL 3350 17 G/17G
17 POWDER, FOR SOLUTION ORAL DAILY PRN
Status: DISCONTINUED | OUTPATIENT
Start: 2024-11-06 | End: 2024-11-13 | Stop reason: HOSPADM

## 2024-11-06 RX ORDER — FUROSEMIDE 40 MG/1
40 TABLET ORAL DAILY
Status: DISCONTINUED | OUTPATIENT
Start: 2024-11-06 | End: 2024-11-13 | Stop reason: HOSPADM

## 2024-11-06 RX ORDER — POTASSIUM CHLORIDE 1500 MG/1
40 TABLET, EXTENDED RELEASE ORAL PRN
Status: DISCONTINUED | OUTPATIENT
Start: 2024-11-06 | End: 2024-11-13 | Stop reason: HOSPADM

## 2024-11-06 RX ORDER — ACETAMINOPHEN 325 MG/1
650 TABLET ORAL EVERY 6 HOURS PRN
Status: DISCONTINUED | OUTPATIENT
Start: 2024-11-06 | End: 2024-11-13 | Stop reason: HOSPADM

## 2024-11-06 RX ORDER — ALPRAZOLAM 0.5 MG
0.5 TABLET ORAL 3 TIMES DAILY PRN
Status: ON HOLD | COMMUNITY
End: 2024-11-09 | Stop reason: HOSPADM

## 2024-11-06 RX ORDER — ONDANSETRON 2 MG/ML
4 INJECTION INTRAMUSCULAR; INTRAVENOUS EVERY 6 HOURS PRN
Status: DISCONTINUED | OUTPATIENT
Start: 2024-11-06 | End: 2024-11-13 | Stop reason: HOSPADM

## 2024-11-06 RX ORDER — GABAPENTIN 600 MG/1
600 TABLET ORAL DAILY
Status: DISCONTINUED | OUTPATIENT
Start: 2024-11-06 | End: 2024-11-06

## 2024-11-06 RX ORDER — ONDANSETRON 4 MG/1
4 TABLET, ORALLY DISINTEGRATING ORAL EVERY 8 HOURS PRN
Status: DISCONTINUED | OUTPATIENT
Start: 2024-11-06 | End: 2024-11-13 | Stop reason: HOSPADM

## 2024-11-06 RX ORDER — BUPROPION HYDROCHLORIDE 150 MG/1
300 TABLET ORAL EVERY MORNING
Status: DISCONTINUED | OUTPATIENT
Start: 2024-11-06 | End: 2024-11-13 | Stop reason: HOSPADM

## 2024-11-06 RX ORDER — BENZONATATE 100 MG/1
100 CAPSULE ORAL 3 TIMES DAILY PRN
COMMUNITY

## 2024-11-06 RX ORDER — BENZONATATE 100 MG/1
100 CAPSULE ORAL 3 TIMES DAILY PRN
Status: DISCONTINUED | OUTPATIENT
Start: 2024-11-06 | End: 2024-11-13 | Stop reason: HOSPADM

## 2024-11-06 RX ORDER — SODIUM CHLORIDE 0.9 % (FLUSH) 0.9 %
5-40 SYRINGE (ML) INJECTION PRN
Status: DISCONTINUED | OUTPATIENT
Start: 2024-11-06 | End: 2024-11-13 | Stop reason: HOSPADM

## 2024-11-06 RX ORDER — LEVOTHYROXINE SODIUM 50 UG/1
50 TABLET ORAL DAILY
Status: DISCONTINUED | OUTPATIENT
Start: 2024-11-06 | End: 2024-11-13 | Stop reason: HOSPADM

## 2024-11-06 RX ORDER — POTASSIUM CHLORIDE 7.45 MG/ML
10 INJECTION INTRAVENOUS PRN
Status: DISCONTINUED | OUTPATIENT
Start: 2024-11-06 | End: 2024-11-13 | Stop reason: HOSPADM

## 2024-11-06 RX ORDER — MAGNESIUM SULFATE IN WATER 40 MG/ML
2000 INJECTION, SOLUTION INTRAVENOUS PRN
Status: DISCONTINUED | OUTPATIENT
Start: 2024-11-06 | End: 2024-11-13 | Stop reason: HOSPADM

## 2024-11-06 RX ORDER — ACETAMINOPHEN 650 MG/1
650 SUPPOSITORY RECTAL EVERY 6 HOURS PRN
Status: DISCONTINUED | OUTPATIENT
Start: 2024-11-06 | End: 2024-11-13 | Stop reason: HOSPADM

## 2024-11-06 RX ORDER — TRAMADOL HYDROCHLORIDE 50 MG/1
50 TABLET ORAL EVERY 6 HOURS PRN
Status: DISCONTINUED | OUTPATIENT
Start: 2024-11-06 | End: 2024-11-13 | Stop reason: HOSPADM

## 2024-11-06 RX ORDER — SODIUM CHLORIDE 9 MG/ML
INJECTION, SOLUTION INTRAVENOUS PRN
Status: DISCONTINUED | OUTPATIENT
Start: 2024-11-06 | End: 2024-11-13 | Stop reason: HOSPADM

## 2024-11-06 RX ORDER — ALBUTEROL SULFATE 90 UG/1
2 INHALANT RESPIRATORY (INHALATION) EVERY 6 HOURS PRN
Status: DISCONTINUED | OUTPATIENT
Start: 2024-11-06 | End: 2024-11-13 | Stop reason: HOSPADM

## 2024-11-06 RX ORDER — SODIUM CHLORIDE 0.9 % (FLUSH) 0.9 %
5-40 SYRINGE (ML) INJECTION EVERY 12 HOURS SCHEDULED
Status: DISCONTINUED | OUTPATIENT
Start: 2024-11-06 | End: 2024-11-13 | Stop reason: HOSPADM

## 2024-11-06 RX ORDER — TRAMADOL HYDROCHLORIDE 50 MG/1
50 TABLET ORAL EVERY 8 HOURS PRN
COMMUNITY

## 2024-11-06 RX ORDER — UREA 10 %
500 LOTION (ML) TOPICAL DAILY
Status: DISCONTINUED | OUTPATIENT
Start: 2024-11-06 | End: 2024-11-13 | Stop reason: HOSPADM

## 2024-11-06 RX ORDER — FERROUS SULFATE 325(65) MG
325 TABLET ORAL
Status: DISCONTINUED | OUTPATIENT
Start: 2024-11-06 | End: 2024-11-13 | Stop reason: HOSPADM

## 2024-11-06 RX ORDER — VITAMIN B COMPLEX
2000 TABLET ORAL DAILY
Status: DISCONTINUED | OUTPATIENT
Start: 2024-11-06 | End: 2024-11-13 | Stop reason: HOSPADM

## 2024-11-06 RX ORDER — ASCORBIC ACID 500 MG
500 TABLET ORAL DAILY
Status: DISCONTINUED | OUTPATIENT
Start: 2024-11-06 | End: 2024-11-13 | Stop reason: HOSPADM

## 2024-11-06 RX ADMIN — Medication 500 MG: at 09:27

## 2024-11-06 RX ADMIN — Medication 2000 UNITS: at 09:44

## 2024-11-06 RX ADMIN — ENOXAPARIN SODIUM 60 MG: 100 INJECTION SUBCUTANEOUS at 09:27

## 2024-11-06 RX ADMIN — BENZONATATE 100 MG: 100 CAPSULE ORAL at 21:19

## 2024-11-06 RX ADMIN — FUROSEMIDE 40 MG: 40 TABLET ORAL at 13:49

## 2024-11-06 RX ADMIN — DOXYCYCLINE 100 MG: 100 INJECTION, POWDER, LYOPHILIZED, FOR SOLUTION INTRAVENOUS at 09:34

## 2024-11-06 RX ADMIN — ENOXAPARIN SODIUM 60 MG: 100 INJECTION SUBCUTANEOUS at 21:19

## 2024-11-06 RX ADMIN — CYANOCOBALAMIN TAB 500 MCG 500 MCG: 500 TAB at 09:27

## 2024-11-06 RX ADMIN — BENZONATATE 100 MG: 100 CAPSULE ORAL at 09:27

## 2024-11-06 RX ADMIN — TRAMADOL HYDROCHLORIDE 50 MG: 50 TABLET ORAL at 09:25

## 2024-11-06 RX ADMIN — TRAMADOL HYDROCHLORIDE 50 MG: 50 TABLET ORAL at 15:50

## 2024-11-06 RX ADMIN — DOXYCYCLINE 100 MG: 100 INJECTION, POWDER, LYOPHILIZED, FOR SOLUTION INTRAVENOUS at 21:16

## 2024-11-06 RX ADMIN — TRAMADOL HYDROCHLORIDE 50 MG: 50 TABLET ORAL at 21:19

## 2024-11-06 RX ADMIN — Medication 10 ML: at 09:27

## 2024-11-06 RX ADMIN — BUPROPION HYDROCHLORIDE 300 MG: 150 TABLET, EXTENDED RELEASE ORAL at 09:27

## 2024-11-06 RX ADMIN — FERROUS SULFATE TAB 325 MG (65 MG ELEMENTAL FE) 325 MG: 325 (65 FE) TAB at 09:44

## 2024-11-06 RX ADMIN — Medication 10 ML: at 21:16

## 2024-11-06 ASSESSMENT — PAIN SCALES - WONG BAKER
WONGBAKER_NUMERICALRESPONSE: NO HURT

## 2024-11-06 ASSESSMENT — PAIN - FUNCTIONAL ASSESSMENT: PAIN_FUNCTIONAL_ASSESSMENT: ACTIVITIES ARE NOT PREVENTED

## 2024-11-06 ASSESSMENT — PAIN SCALES - GENERAL
PAINLEVEL_OUTOF10: 7
PAINLEVEL_OUTOF10: 3
PAINLEVEL_OUTOF10: 8
PAINLEVEL_OUTOF10: 4
PAINLEVEL_OUTOF10: 4
PAINLEVEL_OUTOF10: 3
PAINLEVEL_OUTOF10: 8

## 2024-11-06 ASSESSMENT — PAIN DESCRIPTION - LOCATION
LOCATION: GENERALIZED
LOCATION: LEG
LOCATION: GENERALIZED
LOCATION: GENERALIZED

## 2024-11-06 ASSESSMENT — PAIN DESCRIPTION - DESCRIPTORS: DESCRIPTORS: ACHING;DISCOMFORT

## 2024-11-06 ASSESSMENT — PAIN DESCRIPTION - ORIENTATION: ORIENTATION: RIGHT;LEFT

## 2024-11-06 NOTE — ED NOTES
Pt up to bathroom with assist.  Pt sob with movement.  Urine obtained.  Pt back to bed.  Sitting on side of bed

## 2024-11-06 NOTE — PLAN OF CARE
Problem: Discharge Planning  Goal: Discharge to home or other facility with appropriate resources  11/6/2024 0705 by Nelly Angeles RN  Outcome: Progressing  11/6/2024 0703 by Nelly Angeles RN  Outcome: Progressing     Problem: Skin/Tissue Integrity  Goal: Absence of new skin breakdown  Description: 1.  Monitor for areas of redness and/or skin breakdown  2.  Assess vascular access sites hourly  3.  Every 4-6 hours minimum:  Change oxygen saturation probe site  4.  Every 4-6 hours:  If on nasal continuous positive airway pressure, respiratory therapy assess nares and determine need for appliance change or resting period.  11/6/2024 0705 by Nelly Angeles RN  Outcome: Progressing  11/6/2024 0703 by Nelly Angeles RN  Outcome: Progressing     Problem: Safety - Adult  Goal: Free from fall injury  11/6/2024 0705 by Nelly Angeles RN  Outcome: Progressing  11/6/2024 0703 by Nelly Angeles RN  Outcome: Progressing     Problem: Pain  Goal: Verbalizes/displays adequate comfort level or baseline comfort level  11/6/2024 0705 by Nelly Angeles RN  Outcome: Progressing  11/6/2024 0703 by Nelly Angeles RN  Outcome: Progressing

## 2024-11-06 NOTE — ED NOTES
Pt up to bathroom and back to bed.  Pt takes off o2 to go to bathroom  pulse ox drops to 84% on room air.  Declines portable o2.  2lnc reapplied when pt back in room.  Pulse ox returns to 93% on 2lnc.  Pt sitting up in wheelchair for comfort.  Call light in reach

## 2024-11-06 NOTE — ED NOTES
Pt up to bathroom and back to bed.  Repositioned in bed.  Call light in reach.  Ice chips given per request

## 2024-11-06 NOTE — ED NOTES
Pt up to bathroom with difficulty due to limited mobility.  Pt back pt bed.  Sob with movement.  Pt placed on 2lnc per comfort.  Sitting on side of bed.  Repeat vitals done.  Labs drawn.  Informed pt lasix ordered.  Pt refused due to limited mobility.  Offered pt a purewick or garcia to assist with urination.  Pt declined options and lasix at this time

## 2024-11-06 NOTE — PROGRESS NOTES
ABGs drawn on 2L NC 11/5/2024 at 1950    pH  7.362  pCO2  64.2  pO2 76.0  HCO3  36.4  BE  11  sO2  93.9   Call Reason: to schedule appointment  Visit Type: follow up  When appointment should be scheduled:  6 months  Provider: Dr. Garcia  Appointment Note: 6 month follow up      Left message     Attempt 2/2

## 2024-11-06 NOTE — H&P
History and Physical    Admit Date: 11/6/2024  PCP: Alexa Emerson DO    CHIEF COMPLAINT:  dyspnea, leg wound       HISTORY OF PRESENT ILLNESS:    The patient is a 54 y.o. female with a past medical history of morbid obesity, depression, lymphedema who presented to the hospital at [facility] with dyspnea and leg wounds.  Patient reports over the last few days she has been feeling more dyspneic.  She does not wear oxygen at home and she reports that her saturation at home was in the 80s.  She denies chest pain chest heaviness.  No fever or chills.  She reports that her throat is itchy.  She reports that she has been noticing more drainage from her left lower extremity and she noticed that maggot infestation from wound on her leg posteriorly at the left side.  She required IV antibiotic for leg wounds in the past 2019.  She has history of dementia mentioned above.  She denies history of CHF.    Past Medical History:        Diagnosis Date    Morbid obesity     Overactive bladder     Psychiatric problem        Past Surgical History:        Procedure Laterality Date    ABDOMEN SURGERY      gastric bypass surgery    BACK SURGERY      CHOLECYSTECTOMY         Social History:   Social History     Socioeconomic History    Marital status:      Spouse name: Not on file    Number of children: Not on file    Years of education: Not on file    Highest education level: Not on file   Occupational History    Not on file   Tobacco Use    Smoking status: Never    Smokeless tobacco: Never   Vaping Use    Vaping status: Never Used   Substance and Sexual Activity    Alcohol use: Yes     Comment: occasional    Drug use: No    Sexual activity: Not on file   Other Topics Concern    Not on file   Social History Narrative    Lives w      Social Determinants of Health     Financial Resource Strain: Patient Declined (4/1/2024)    Received from [facility]    Overall Financial Resource Strain (CARDIA)     Difficulty

## 2024-11-06 NOTE — ED NOTES
[facility] called for updated eta. Carolin states not until after 8am because they need two crews to transport pt.

## 2024-11-06 NOTE — ED NOTES
Pt accepted to [facility]. Pt going to [address] [place] [address]. Number for report [phone].

## 2024-11-06 NOTE — RT PROTOCOL NOTE
RT Inhaler-Nebulizer Bronchodilator Protocol Note    There is a bronchodilator order in the chart from a provider indicating to follow the RT Bronchodilator Protocol and there is an “Initiate RT Inhaler-Nebulizer Bronchodilator Protocol” order as well (see protocol at bottom of note).    CXR Findings:  XR CHEST PORTABLE    Result Date: 11/5/2024  Cardiomegaly with pulmonary vascular congestion.       The findings from the last RT Protocol Assessment were as follows:   History Pulmonary Disease: None or smoker <15 pack years  Respiratory Pattern: Regular pattern and RR 12-20 bpm  Breath Sounds: Slightly diminished and/or crackles  Cough: Strong, spontaneous, non-productive  Indication for Bronchodilator Therapy: Decreased or absent breath sounds  Bronchodilator Assessment Score: 2    Aerosolized bronchodilator medication orders have been revised according to the RT Inhaler-Nebulizer Bronchodilator Protocol below.    Respiratory Therapist to perform RT Therapy Protocol Assessment initially then follow the protocol.  Repeat RT Therapy Protocol Assessment PRN for score 0-3 or on second treatment, BID, and PRN for scores above 3.    No Indications - adjust the frequency to every 6 hours PRN wheezing or bronchospasm, if no treatments needed after 48 hours then discontinue using Per Protocol order mode.     If indication present, adjust the RT bronchodilator orders based on the Bronchodilator Assessment Score as indicated below.  Use Inhaler orders unless patient has one or more of the following: on home nebulizer, not able to hold breath for 10 seconds, is not alert and oriented, cannot activate and use MDI correctly, or respiratory rate 25 breaths per minute or more, then use the equivalent nebulizer order(s) with same Frequency and PRN reasons based on the score.  If a patient is on this medication at home then do not decrease Frequency below that used at home.    0-3 - enter or revise RT bronchodilator order(s) to

## 2024-11-06 NOTE — ED NOTES
[facility] called back and spoke to Emily to see if ED staff could assist crew getting pt into squad and to be able to get transport here sooner as pt is able to assist in transfer. Emily states they are hoping to be able to get here close to [phone].

## 2024-11-07 ENCOUNTER — APPOINTMENT (OUTPATIENT)
Age: 54
DRG: 602 | End: 2024-11-07
Attending: INTERNAL MEDICINE
Payer: COMMERCIAL

## 2024-11-07 LAB
ALBUMIN SERPL-MCNC: 3 G/DL (ref 3.5–4.6)
ALP SERPL-CCNC: 52 U/L (ref 40–130)
ALT SERPL-CCNC: 7 U/L (ref 0–33)
ANION GAP SERPL CALCULATED.3IONS-SCNC: 7 MEQ/L (ref 9–15)
ANISOCYTOSIS BLD QL SMEAR: ABNORMAL
AST SERPL-CCNC: 12 U/L (ref 0–35)
BACTERIA BLD CULT ORG #2: NORMAL
BACTERIA BLD CULT: NORMAL
BASOPHILS # BLD: 0 K/UL (ref 0–0.2)
BASOPHILS NFR BLD: 0.3 %
BILIRUB SERPL-MCNC: 0.8 MG/DL (ref 0.2–0.7)
BUN SERPL-MCNC: 7 MG/DL (ref 6–20)
CALCIUM SERPL-MCNC: 8.7 MG/DL (ref 8.5–9.9)
CHLORIDE SERPL-SCNC: 95 MEQ/L (ref 95–107)
CO2 SERPL-SCNC: 36 MEQ/L (ref 20–31)
CREAT SERPL-MCNC: 0.48 MG/DL (ref 0.5–0.9)
CRP SERPL HS-MCNC: 56.1 MG/L (ref 0–5)
ECHO AO ROOT DIAM: 3 CM
ECHO AO ROOT INDEX: 0.95 CM/M2
ECHO AV MEAN GRADIENT: 4 MMHG
ECHO AV MEAN VELOCITY: 1 M/S
ECHO AV PEAK GRADIENT: 6 MMHG
ECHO AV PEAK VELOCITY: 1.2 M/S
ECHO AV VTI: 23.1 CM
ECHO BSA: 3.57 M2
ECHO LV E' LATERAL VELOCITY: 11 CM/S
ECHO LV EJECTION FRACTION BIPLANE: 50 % (ref 55–100)
ECHO LV INTERNAL DIMENSION DIASTOLE INDEX: 1.58 CM/M2
ECHO LV INTERNAL DIMENSION DIASTOLIC: 5 CM (ref 3.9–5.3)
ECHO LV IVSD: 1.9 CM (ref 0.6–0.9)
ECHO LV MASS 2D: 322.6 G (ref 67–162)
ECHO LV MASS INDEX 2D: 101.8 G/M2 (ref 43–95)
ECHO LV POSTERIOR WALL DIASTOLIC: 1.1 CM (ref 0.6–0.9)
ECHO LV RELATIVE WALL THICKNESS RATIO: 0.44
ECHO LVOT AREA: 3.1 CM2
ECHO LVOT DIAM: 2 CM
ECHO RV INTERNAL DIMENSION: 3.8 CM
EKG ATRIAL RATE: 87 BPM
EKG P AXIS: 55 DEGREES
EKG P-R INTERVAL: 184 MS
EKG Q-T INTERVAL: 368 MS
EKG QRS DURATION: 94 MS
EKG QTC CALCULATION (BAZETT): 442 MS
EKG R AXIS: 267 DEGREES
EKG T AXIS: 62 DEGREES
EKG VENTRICULAR RATE: 87 BPM
EOSINOPHIL # BLD: 0.1 K/UL (ref 0–0.7)
EOSINOPHIL NFR BLD: 1 %
ERYTHROCYTE [DISTWIDTH] IN BLOOD BY AUTOMATED COUNT: 15.3 % (ref 11.5–14.5)
GLOBULIN SER CALC-MCNC: 3.6 G/DL (ref 2.3–3.5)
GLUCOSE SERPL-MCNC: 126 MG/DL (ref 70–99)
HCT VFR BLD AUTO: 43.4 % (ref 37–47)
HGB BLD-MCNC: 13.4 G/DL (ref 12–16)
LYMPHOCYTES # BLD: 1.3 K/UL (ref 1–4.8)
LYMPHOCYTES NFR BLD: 19.1 %
MCH RBC QN AUTO: 25.2 PG (ref 27–31.3)
MCHC RBC AUTO-ENTMCNC: 30.9 % (ref 33–37)
MCV RBC AUTO: 81.7 FL (ref 79.4–94.8)
MONOCYTES # BLD: 0.6 K/UL (ref 0.2–0.8)
MONOCYTES NFR BLD: 9.3 %
NEUTROPHILS # BLD: 4.7 K/UL (ref 1.4–6.5)
NEUTS SEG NFR BLD: 69.7 %
PLATELET # BLD AUTO: 125 K/UL (ref 130–400)
PLATELET BLD QL SMEAR: ABNORMAL
POTASSIUM SERPL-SCNC: 4.1 MEQ/L (ref 3.4–4.9)
PROT SERPL-MCNC: 6.6 G/DL (ref 6.3–8)
RBC # BLD AUTO: 5.31 M/UL (ref 4.2–5.4)
SLIDE REVIEW: ABNORMAL
SODIUM SERPL-SCNC: 138 MEQ/L (ref 135–144)
WBC # BLD AUTO: 6.8 K/UL (ref 4.8–10.8)

## 2024-11-07 PROCEDURE — 97162 PT EVAL MOD COMPLEX 30 MIN: CPT

## 2024-11-07 PROCEDURE — 97166 OT EVAL MOD COMPLEX 45 MIN: CPT

## 2024-11-07 PROCEDURE — 1210000000 HC MED SURG R&B

## 2024-11-07 PROCEDURE — 2500000003 HC RX 250 WO HCPCS: Performed by: INTERNAL MEDICINE

## 2024-11-07 PROCEDURE — 85025 COMPLETE CBC W/AUTO DIFF WBC: CPT

## 2024-11-07 PROCEDURE — 86140 C-REACTIVE PROTEIN: CPT

## 2024-11-07 PROCEDURE — 6370000000 HC RX 637 (ALT 250 FOR IP): Performed by: INTERNAL MEDICINE

## 2024-11-07 PROCEDURE — 93010 ELECTROCARDIOGRAM REPORT: CPT | Performed by: INTERNAL MEDICINE

## 2024-11-07 PROCEDURE — 94761 N-INVAS EAR/PLS OXIMETRY MLT: CPT

## 2024-11-07 PROCEDURE — 6360000002 HC RX W HCPCS: Performed by: INTERNAL MEDICINE

## 2024-11-07 PROCEDURE — 93306 TTE W/DOPPLER COMPLETE: CPT | Performed by: INTERNAL MEDICINE

## 2024-11-07 PROCEDURE — 2700000000 HC OXYGEN THERAPY PER DAY

## 2024-11-07 PROCEDURE — 36415 COLL VENOUS BLD VENIPUNCTURE: CPT

## 2024-11-07 PROCEDURE — 80053 COMPREHEN METABOLIC PANEL: CPT

## 2024-11-07 PROCEDURE — 99232 SBSQ HOSP IP/OBS MODERATE 35: CPT | Performed by: INTERNAL MEDICINE

## 2024-11-07 PROCEDURE — 94640 AIRWAY INHALATION TREATMENT: CPT

## 2024-11-07 PROCEDURE — 2580000003 HC RX 258: Performed by: INTERNAL MEDICINE

## 2024-11-07 PROCEDURE — 6360000004 HC RX CONTRAST MEDICATION: Performed by: INTERNAL MEDICINE

## 2024-11-07 PROCEDURE — C8929 TTE W OR WO FOL WCON,DOPPLER: HCPCS

## 2024-11-07 PROCEDURE — 97530 THERAPEUTIC ACTIVITIES: CPT

## 2024-11-07 RX ADMIN — TRAMADOL HYDROCHLORIDE 50 MG: 50 TABLET ORAL at 09:05

## 2024-11-07 RX ADMIN — Medication 10 ML: at 21:32

## 2024-11-07 RX ADMIN — DOXYCYCLINE 100 MG: 100 INJECTION, POWDER, LYOPHILIZED, FOR SOLUTION INTRAVENOUS at 09:16

## 2024-11-07 RX ADMIN — BUPROPION HYDROCHLORIDE 300 MG: 150 TABLET, EXTENDED RELEASE ORAL at 08:55

## 2024-11-07 RX ADMIN — DOXYCYCLINE 100 MG: 100 INJECTION, POWDER, LYOPHILIZED, FOR SOLUTION INTRAVENOUS at 19:49

## 2024-11-07 RX ADMIN — Medication 500 MG: at 08:55

## 2024-11-07 RX ADMIN — FERROUS SULFATE TAB 325 MG (65 MG ELEMENTAL FE) 325 MG: 325 (65 FE) TAB at 08:55

## 2024-11-07 RX ADMIN — FUROSEMIDE 40 MG: 40 TABLET ORAL at 08:55

## 2024-11-07 RX ADMIN — TRAMADOL HYDROCHLORIDE 50 MG: 50 TABLET ORAL at 16:45

## 2024-11-07 RX ADMIN — ENOXAPARIN SODIUM 60 MG: 100 INJECTION SUBCUTANEOUS at 19:48

## 2024-11-07 RX ADMIN — ENOXAPARIN SODIUM 60 MG: 100 INJECTION SUBCUTANEOUS at 08:54

## 2024-11-07 RX ADMIN — CYANOCOBALAMIN TAB 500 MCG 500 MCG: 500 TAB at 08:55

## 2024-11-07 RX ADMIN — BENZONATATE 100 MG: 100 CAPSULE ORAL at 19:48

## 2024-11-07 RX ADMIN — BENZONATATE 100 MG: 100 CAPSULE ORAL at 09:05

## 2024-11-07 RX ADMIN — PERFLUTREN 1.5 ML: 6.52 INJECTION, SUSPENSION INTRAVENOUS at 13:05

## 2024-11-07 RX ADMIN — LEVOTHYROXINE SODIUM 50 MCG: 0.05 TABLET ORAL at 04:39

## 2024-11-07 RX ADMIN — Medication 2000 UNITS: at 09:05

## 2024-11-07 RX ADMIN — Medication 10 ML: at 08:55

## 2024-11-07 RX ADMIN — ALBUTEROL SULFATE 2 PUFF: 90 AEROSOL, METERED RESPIRATORY (INHALATION) at 20:16

## 2024-11-07 ASSESSMENT — PAIN SCALES - GENERAL
PAINLEVEL_OUTOF10: 3
PAINLEVEL_OUTOF10: 3
PAINLEVEL_OUTOF10: 8
PAINLEVEL_OUTOF10: 7
PAINLEVEL_OUTOF10: 5

## 2024-11-07 ASSESSMENT — PAIN SCALES - WONG BAKER
WONGBAKER_NUMERICALRESPONSE: NO HURT

## 2024-11-07 NOTE — CARE COORDINATION
CM DC PLAN DISCUSSED.  PT IS AGREEABLE THAT SHE PREFERS SHE GO HOME WITH [facility]. DUE TO HER WEIGHT THERE WILL BE DIFFICULTY PLACING THE PT.  PT SAYS SHE WILL HAVE TRANSPORT HER HOME.  SHE SAYS AMBULANCE TRANSPORT WILL BE TOO UNCOMFORTABLE DUE TO HER SIZE.      CALL TO MORGAN AT [facility], MEGAN, PROGRESS NOTES AND H&P FAXED TO [facility] AS WELL.

## 2024-11-07 NOTE — CONSULTS
PODIATRIC MEDICINE AND SURGERY  CONSULT HISTORY AND PHYSICAL    Consulting Service:  Medicine  Requesting Provider: Jackie Rose DO   Opinion/advice regarding: BLE wounds  Staff Doctor:  Dr. German Durant    ASSESSMENT:  54 y.o. female who has  has a past medical history of Morbid obesity, Overactive bladder, and Psychiatric problem.  Presented to ED yesterday for shortness of breath and was subsequently admitted for pulmonary workup. Maggots seen on the L leg in ED that were rinsed but none seen today. Podiatry was consulted for BLE wounds. Previously has seen Dr. Durant in wound care center. White count on admission was 7.5. VSS. Afebrile. Wounds appear stable with low suspicion for active infection stemming from the ulcerations. Plan for local wound care at this time.    PLAN AND RECOMMENDATIONS::  Patient's case to be discussed with staff, Dr. Durant, who will provide final recommendations going forward  Wound care: xeroform, ca alginate, ABD, crispin, ace.  WBAT  Elevate BLE at or above heart level while resting  Antibiotic coverage per ID/primary   Pain management per primary team   Podiatry to follow periperally while in house   Patient will need follow up with Dr. Durant at wound care center within 7-10 days of discharge      HPI: This 54 y.o. year old female was seen today for BLE wounds.  Patient states that they have been on going for a while.  States she horton snot like the Unna boot as she feels that is when she gets maggots in her wound. Patient denies nausea, vomiting, diarrhea, fevers, chills, chest pain, shortness of breath, head ache, or calf pain at this time.  No other pedal complaints.     Past Medical History:   Diagnosis Date    Morbid obesity     Overactive bladder     Psychiatric problem        Past Surgical History:   Procedure Laterality Date    ABDOMEN SURGERY      gastric bypass surgery    BACK SURGERY      CHOLECYSTECTOMY         No current facility-administered medications on 
Infectious Diseases Inpatient Consult Note      Reason for Consult:   Leg cellulitis  Requesting Physician:   Dr. Rose  Primary Care Physician:  Alexa Emerson DO  History Obtained From:   Pt, EPIC    Admit Date: 11/6/2024  Hospital Day: 1      HISTORY OF PRESENT ILLNESS:  This is a 54 y.o. female with past medical history of severe morbid obesity, chronic lymphedema, overactive bladder, history of gastric bypass surgery, was admitted to [facility]  from home through ER with increasing shortness of breath a few days duration.  Patient was reported to be hypoxic prior to admission.  Patient reported increasing weight of 40 pounds over the past few months.  She reported increasing leg swelling and abdominal girth.  Nonhealing ulcers of the posterior aspect of the legs with increased continuous drainage.  She has been getting Unna boots applied that gets changed on a daily basis.  Was found to have maggots in left leg.  Patient reports history of maggots in the past.  Patient has multiple drug allergies including vancomycin amoxicillin and cephalexin.  She was started on IV doxycycline and Levaquin on admission.  Patient denied any fevers or chills.  She reports left leg posterior aspect soreness.  She reports chronic bilateral legs discoloration.  She denies any dysuria or hematuria.  Denies any rash.  No cough.  No fevers or chills.  Patient is currently using 3 L nasal cannula.  She was not on any oxygen at home      Past medical surgical and social history were reviewed  Past Medical History:   Diagnosis Date    Morbid obesity     Overactive bladder     Psychiatric problem        Past Surgical History:   Procedure Laterality Date    ABDOMEN SURGERY      gastric bypass surgery    BACK SURGERY      CHOLECYSTECTOMY         Current Medications:     sodium chloride flush  5-40 mL IntraVENous 2 times per day    enoxaparin  60 mg SubCUTAneous BID    levofloxacin  750 mg IntraVENous Q24H    doxycycline (VIBRAMYCIN) IV  100 
Spiritual Health History and Assessment/Progress Note  [facility]    Initial Encounter, Advance Care Planning,  ,  ,      Name: Ana Laura Gongora MRN: [id number]    Age: 54 y.o.     Sex: female   Language: English   Oriental orthodox: None   Open leg wound, left, initial encounter     Date: 11/6/2024            Total Time Calculated: 30 min              Spiritual Assessment [place] in [facility] [place]  4W MED SURG UNIT        Referral/Consult From: Nurse   Encounter Overview/Reason: Initial Encounter, Advance Care Planning  Service Provided For: Patient     visited patient for an advanced directive consult.  explained the purpose of advanced directives and gave the an AD packet. Patient expressed understanding.  explained the process for completing AD forms and patient said she would read over the documents before taking further action.    [place], Belief, Meaning:   Patient has beliefs or practices that help with coping during difficult times  Family/Friends No family/friends present      Importance and Influence:  Patient has no beliefs influential to healthcare decision-making identified during this visit  Family/Friends No family/friends present    Community:  Patient feels well-supported. Support system includes: Friends and Extended family  Family/Friends No family/friends present    Assessment and Plan of Care:     Patient Interventions include: Assisted in Advance Care Planning conversation  Family/Friends Interventions include: No family/friends present    Patient Plan of Care: Spiritual Care available upon further referral  Family/Friends Plan of Care: No family/friends present    Electronically signed by Chaplain Elida on 11/6/2024 at 2:30 PM   
  Recent Labs     11/05/24 2231   COLORU Yellow   NITRU Positive   LEUKOCYTESUR Trace   GLUCOSEU Negative   KETUA Negative   RBCUA 10-20*   WBCUA 10-20*   BACTERIA FEW*     LACTATE:   Recent Labs     11/05/24 1944   LACTA 2.4*     ABGs:   Recent Labs     11/05/24 2050   PHART 7.362   EHS1OTF 64*   PO2ART 76   QAK5HBD 36.4*   BEART 11   D7TKKAVA 94   WMT7SQD 38*     O2 Device: Nasal cannula  Lab Results   Component Value Date/Time    LACTA 2.4 11/05/2024 07:44 PM    LACTA 1.3 09/19/2018 06:59 AM    LACTA 2.2 09/18/2018 04:15 PM         Radiology Review:  No results found.    Assessment and  plan:     Bilateral lower extremity wound infection-worse on the left side-maggot infestation   Fluid overload  Cough likely due to bronchitis  Dyspnea with exertion, multifactorial  Morbid obesity WARD  Depression    Patient is having short of breath with exertion Which is worsening last 2 weeks likely due to underlying atelectasis, bronchitis, rule out CHF.  She may have underlying viral bronchitis causing cough which is mostly dry.  Overall shortness of breath appeared to be multifactorial patient will continue BiPAP therapy.  She was started on Levaquin and doxycycline ID to follow and change antibiotic as per ID recommendation.  Will follow.    Thank you for consult     NOTE: This report was transcribed using voice recognition software. Every effort was made to ensure accuracy; however, inadvertent computerized transcription errors may be present.    Comments:   Thank you for allowing us to participate in the care of this patient. Will continue to follow.Please call if questions or concerns arise.      Electronically signed by Mazin Chang MD, FCCP on 11/6/2024 at 8:06 PM    
by 30 minutes.    The patient is not counting daily calories.    There is not a current exercise routine.    There has not been a recent evaluation for sleep apnea.    The patient is not following the rule of 20's:  Chewing each bite at least 20 times, putting the fork down for 20 seconds after swallowing, and making the meal last at least 20 minutes.    We discussed that the most effective treatment for morbid obesity is bariatric surgery.  We discussed the risks and benefits of laparoscopic or robotic Zay-en-Y gastric bypass.  We discussed that over 80% of patients undergoing gastric bypass are able to maintain anywhere from half to all of her excess weight off and studies that exceed 25 years.  We discussed that there are risks for infection, bleeding, pain, need for reoperation, death, and vitamin and mineral deficiencies associated with the surgery.  We discussed that nicotine use is contraindicated after gastric bypass due to the risk of marginal ulcer formation.      We discussed that revision of the common channel to 300 cm is associated with up to a 90% excess weight loss (EWL) at 1 year, 68-85% EWL at 5 years post op, and 77% EWL at 10 years post op (Revision of Zay-en-Y Gastric Bypass for Inadequate Weight Loss or Weight Regain, SELVIN Cosby, [employer], 2022 Jan-Feb; 36(1): 30-39).  We discussed that there is some increased risk for vitamin and mineral deficiencies with such a revision and that maintenance of vitamin and mineral supplementation would be necessary to avoid deficiencies.      We discussed that if a hiatal hernia is observed at surgery, we would repair the hiatal hernia to avoid potential obstruction in the case of gastric bypass or severe GERD in the case of sleeve or duodenal switch.   We discussed the risks and benefits of laparoscopic or robotic hiatal hernia repair.  The risks include infection, bleeding, pain, possible diarrhea, need for re-operation, blood clotting, or

## 2024-11-07 NOTE — PLAN OF CARE
Problem: Discharge Planning  Goal: Discharge to home or other facility with appropriate resources  Outcome: Progressing  Flowsheets (Taken 11/6/2024 0831 by Aleksandr Fried, RN)  Discharge to home or other facility with appropriate resources: Identify barriers to discharge with patient and caregiver     Problem: Skin/Tissue Integrity  Goal: Absence of new skin breakdown  Description: 1.  Monitor for areas of redness and/or skin breakdown  2.  Assess vascular access sites hourly  3.  Every 4-6 hours minimum:  Change oxygen saturation probe site  4.  Every 4-6 hours:  If on nasal continuous positive airway pressure, respiratory therapy assess nares and determine need for appliance change or resting period.  Outcome: Progressing     Problem: Safety - Adult  Goal: Free from fall injury  Outcome: Progressing     Problem: Pain  Goal: Verbalizes/displays adequate comfort level or baseline comfort level  Outcome: Progressing

## 2024-11-07 NOTE — PLAN OF CARE
Therapy evaluation completed.  Please see daily notes and/or progress notes for details related to planned treatment interventions, goals and functional performance.     Electronically signed by Magdalena Higginbotham PT on 11/7/2024 at 3:30 PM

## 2024-11-08 PROBLEM — J96.01 ACUTE HYPOXIC RESPIRATORY FAILURE: Status: ACTIVE | Noted: 2024-11-08

## 2024-11-08 PROBLEM — I50.31 ACUTE DIASTOLIC HEART FAILURE (HCC): Status: ACTIVE | Noted: 2024-11-08

## 2024-11-08 PROBLEM — I89.0 CHRONIC ACQUIRED LYMPHEDEMA: Status: ACTIVE | Noted: 2018-08-07

## 2024-11-08 PROBLEM — E66.01 OBESITY, MORBID (MORE THAN 100 LBS OVER IDEAL WEIGHT OR BMI > 40): Status: ACTIVE | Noted: 2018-06-26

## 2024-11-08 LAB
ALBUMIN SERPL-MCNC: 3 G/DL (ref 3.5–4.6)
ALP SERPL-CCNC: 50 U/L (ref 40–130)
ALT SERPL-CCNC: 7 U/L (ref 0–33)
ANION GAP SERPL CALCULATED.3IONS-SCNC: 6 MEQ/L (ref 9–15)
AST SERPL-CCNC: 13 U/L (ref 0–35)
BASOPHILS # BLD: 0 K/UL (ref 0–0.2)
BASOPHILS NFR BLD: 0.3 %
BILIRUB SERPL-MCNC: 0.8 MG/DL (ref 0.2–0.7)
BUN SERPL-MCNC: 7 MG/DL (ref 6–20)
CALCIUM SERPL-MCNC: 8.7 MG/DL (ref 8.5–9.9)
CHLORIDE SERPL-SCNC: 94 MEQ/L (ref 95–107)
CO2 SERPL-SCNC: 38 MEQ/L (ref 20–31)
CREAT SERPL-MCNC: 0.48 MG/DL (ref 0.5–0.9)
EOSINOPHIL # BLD: 0.1 K/UL (ref 0–0.7)
EOSINOPHIL NFR BLD: 1.5 %
ERYTHROCYTE [DISTWIDTH] IN BLOOD BY AUTOMATED COUNT: 15.2 % (ref 11.5–14.5)
GLOBULIN SER CALC-MCNC: 3.6 G/DL (ref 2.3–3.5)
GLUCOSE SERPL-MCNC: 114 MG/DL (ref 70–99)
HCT VFR BLD AUTO: 43.3 % (ref 37–47)
HGB BLD-MCNC: 12.7 G/DL (ref 12–16)
LYMPHOCYTES # BLD: 1.3 K/UL (ref 1–4.8)
LYMPHOCYTES NFR BLD: 19.4 %
MCH RBC QN AUTO: 24.7 PG (ref 27–31.3)
MCHC RBC AUTO-ENTMCNC: 29.3 % (ref 33–37)
MCV RBC AUTO: 84.2 FL (ref 79.4–94.8)
MONOCYTES # BLD: 0.8 K/UL (ref 0.2–0.8)
MONOCYTES NFR BLD: 11.8 %
NEUTROPHILS # BLD: 4.3 K/UL (ref 1.4–6.5)
NEUTS SEG NFR BLD: 66.7 %
PLATELET # BLD AUTO: 136 K/UL (ref 130–400)
POTASSIUM SERPL-SCNC: 4.1 MEQ/L (ref 3.4–4.9)
PROT SERPL-MCNC: 6.6 G/DL (ref 6.3–8)
RBC # BLD AUTO: 5.14 M/UL (ref 4.2–5.4)
SODIUM SERPL-SCNC: 138 MEQ/L (ref 135–144)
WBC # BLD AUTO: 6.5 K/UL (ref 4.8–10.8)

## 2024-11-08 PROCEDURE — 85025 COMPLETE CBC W/AUTO DIFF WBC: CPT

## 2024-11-08 PROCEDURE — 97110 THERAPEUTIC EXERCISES: CPT

## 2024-11-08 PROCEDURE — 2580000003 HC RX 258: Performed by: INTERNAL MEDICINE

## 2024-11-08 PROCEDURE — 36415 COLL VENOUS BLD VENIPUNCTURE: CPT

## 2024-11-08 PROCEDURE — 80053 COMPREHEN METABOLIC PANEL: CPT

## 2024-11-08 PROCEDURE — 2500000003 HC RX 250 WO HCPCS: Performed by: INTERNAL MEDICINE

## 2024-11-08 PROCEDURE — 94761 N-INVAS EAR/PLS OXIMETRY MLT: CPT

## 2024-11-08 PROCEDURE — 99232 SBSQ HOSP IP/OBS MODERATE 35: CPT | Performed by: INTERNAL MEDICINE

## 2024-11-08 PROCEDURE — 2700000000 HC OXYGEN THERAPY PER DAY

## 2024-11-08 PROCEDURE — 6370000000 HC RX 637 (ALT 250 FOR IP): Performed by: INTERNAL MEDICINE

## 2024-11-08 PROCEDURE — 1210000000 HC MED SURG R&B

## 2024-11-08 PROCEDURE — 6360000002 HC RX W HCPCS: Performed by: INTERNAL MEDICINE

## 2024-11-08 PROCEDURE — 94640 AIRWAY INHALATION TREATMENT: CPT

## 2024-11-08 PROCEDURE — 6370000000 HC RX 637 (ALT 250 FOR IP)

## 2024-11-08 RX ORDER — DIPHENHYDRAMINE HCL 25 MG
25 TABLET ORAL EVERY 6 HOURS PRN
Status: DISCONTINUED | OUTPATIENT
Start: 2024-11-08 | End: 2024-11-13 | Stop reason: HOSPADM

## 2024-11-08 RX ORDER — SPIRONOLACTONE 25 MG/1
50 TABLET ORAL DAILY
Status: DISCONTINUED | OUTPATIENT
Start: 2024-11-08 | End: 2024-11-13 | Stop reason: HOSPADM

## 2024-11-08 RX ORDER — IPRATROPIUM BROMIDE AND ALBUTEROL SULFATE 2.5; .5 MG/3ML; MG/3ML
1 SOLUTION RESPIRATORY (INHALATION) EVERY 4 HOURS PRN
Status: DISCONTINUED | OUTPATIENT
Start: 2024-11-08 | End: 2024-11-13 | Stop reason: HOSPADM

## 2024-11-08 RX ADMIN — DIPHENHYDRAMINE HCL 25 MG: 25 TABLET ORAL at 16:49

## 2024-11-08 RX ADMIN — DOXYCYCLINE 100 MG: 100 INJECTION, POWDER, LYOPHILIZED, FOR SOLUTION INTRAVENOUS at 21:04

## 2024-11-08 RX ADMIN — BUPROPION HYDROCHLORIDE 300 MG: 150 TABLET, EXTENDED RELEASE ORAL at 08:59

## 2024-11-08 RX ADMIN — ACETAMINOPHEN 325MG 650 MG: 325 TABLET ORAL at 20:59

## 2024-11-08 RX ADMIN — Medication 5 ML: at 23:02

## 2024-11-08 RX ADMIN — IPRATROPIUM BROMIDE AND ALBUTEROL SULFATE 1 DOSE: 2.5; .5 SOLUTION RESPIRATORY (INHALATION) at 02:58

## 2024-11-08 RX ADMIN — ENOXAPARIN SODIUM 60 MG: 100 INJECTION SUBCUTANEOUS at 10:10

## 2024-11-08 RX ADMIN — BENZONATATE 100 MG: 100 CAPSULE ORAL at 15:46

## 2024-11-08 RX ADMIN — SPIRONOLACTONE 50 MG: 25 TABLET ORAL at 15:46

## 2024-11-08 RX ADMIN — TRAMADOL HYDROCHLORIDE 50 MG: 50 TABLET ORAL at 03:37

## 2024-11-08 RX ADMIN — FUROSEMIDE 40 MG: 40 TABLET ORAL at 08:59

## 2024-11-08 RX ADMIN — FERROUS SULFATE TAB 325 MG (65 MG ELEMENTAL FE) 325 MG: 325 (65 FE) TAB at 08:59

## 2024-11-08 RX ADMIN — IPRATROPIUM BROMIDE AND ALBUTEROL SULFATE 1 DOSE: 2.5; .5 SOLUTION RESPIRATORY (INHALATION) at 09:38

## 2024-11-08 RX ADMIN — TRAMADOL HYDROCHLORIDE 50 MG: 50 TABLET ORAL at 15:46

## 2024-11-08 RX ADMIN — ENOXAPARIN SODIUM 60 MG: 100 INJECTION SUBCUTANEOUS at 21:00

## 2024-11-08 RX ADMIN — Medication 500 MG: at 08:59

## 2024-11-08 RX ADMIN — Medication 2000 UNITS: at 10:10

## 2024-11-08 RX ADMIN — LEVOTHYROXINE SODIUM 50 MCG: 0.05 TABLET ORAL at 06:21

## 2024-11-08 RX ADMIN — IPRATROPIUM BROMIDE AND ALBUTEROL SULFATE 1 DOSE: 2.5; .5 SOLUTION RESPIRATORY (INHALATION) at 15:51

## 2024-11-08 RX ADMIN — Medication 10 ML: at 09:00

## 2024-11-08 RX ADMIN — DOXYCYCLINE 100 MG: 100 INJECTION, POWDER, LYOPHILIZED, FOR SOLUTION INTRAVENOUS at 09:06

## 2024-11-08 RX ADMIN — CYANOCOBALAMIN TAB 500 MCG 500 MCG: 500 TAB at 08:58

## 2024-11-08 ASSESSMENT — PAIN SCALES - GENERAL
PAINLEVEL_OUTOF10: 7
PAINLEVEL_OUTOF10: 6
PAINLEVEL_OUTOF10: 9

## 2024-11-08 ASSESSMENT — PAIN DESCRIPTION - LOCATION
LOCATION: BACK;CHEST
LOCATION: BACK;KNEE

## 2024-11-08 ASSESSMENT — PAIN DESCRIPTION - DESCRIPTORS
DESCRIPTORS: ACHING;TIGHTNESS
DESCRIPTORS: ACHING

## 2024-11-08 ASSESSMENT — PAIN SCALES - WONG BAKER: WONGBAKER_NUMERICALRESPONSE: HURTS A LITTLE BIT

## 2024-11-08 ASSESSMENT — PAIN DESCRIPTION - ORIENTATION
ORIENTATION: LEFT;RIGHT
ORIENTATION: LEFT;MID;POSTERIOR

## 2024-11-08 NOTE — PLAN OF CARE
Problem: Discharge Planning  Goal: Discharge to home or other facility with appropriate resources  11/8/2024 1119 by Beatrice Ramsey, RN  Outcome: Progressing  11/8/2024 0537 by Nery Benoit, RN  Outcome: Progressing     Problem: Skin/Tissue Integrity  Goal: Absence of new skin breakdown  Description: 1.  Monitor for areas of redness and/or skin breakdown  2.  Assess vascular access sites hourly  3.  Every 4-6 hours minimum:  Change oxygen saturation probe site  4.  Every 4-6 hours:  If on nasal continuous positive airway pressure, respiratory therapy assess nares and determine need for appliance change or resting period.  11/8/2024 1119 by Beatrice Ramsey, RN  Outcome: Progressing  11/8/2024 0537 by Nery Benoit, RN  Outcome: Progressing     Problem: Safety - Adult  Goal: Free from fall injury  Outcome: Progressing     Problem: Pain  Goal: Verbalizes/displays adequate comfort level or baseline comfort level  Outcome: Progressing

## 2024-11-08 NOTE — CARE COORDINATION
MET W/PT TO ASSESS NEEDS AND DISCUSS DISCHARGE PLAN WHICH IS HOME ALONE W/[facility] WHEN MEDICALLY CLEARED. FOLLOW FOR O2 NEEDS. CURRENTLY WEARING O2 3L NC. C/O SHORTNESS OF BREATH WHILE SPEAKING AND MINIMAL EXERTION. PT HAS [facility] 15 HRS/WEEK AND [facility]. SISTER LIVES NEAR BY AND IS SUPPORTIVE.

## 2024-11-08 NOTE — CARE COORDINATION
SPOKE W/CHASE FROM [facility] TO PROVIDE UPDATE. THEY RECEIVED MEGAN ORDER AND OTHER CLINICAL. INFORMED THAT PT HAS AT LEAST ONE MORE DAY AND WILL LIKELY NEED HOME O2 EVALUATION UPON DISCHARGE. THEY WOULD LIKE DISCHARGE ORDER AND SUMMARY FAXED TO [facility] [phone].

## 2024-11-08 NOTE — DISCHARGE INSTRUCTIONS
Follow up with primary care physician in the next 7 days or sooner if needed. If you do not have a Primary care physician, please schedule an appointment with one. Please ask prior to discharge about a list of local providers.     Please return to ER or call 911 if you develop any significant signs or symptoms.    I may not have addressed all of your medical illnesses or the abnormal blood work or imaging therefore please ask your PCP to obtain [facility] record to follow up on all of the abnormal labs, imaging and findings that I have and have not addressed during your hospitalization.     Discharging you from the hospital does not mean that your medical care ends here and now. You may still need additional work up, investigation, monitoring, and treatment to be handled from this point on by outside providers including your PCP, Specialists and other healthcare providers.     For medication questions, contact your retail pharmacy and your PCP.    Your medical team at [facility] appreciates the opportunity to work with you to get well!    Jackie Rose,   11:14 AM

## 2024-11-09 LAB
ALBUMIN SERPL-MCNC: 3.2 G/DL (ref 3.5–4.6)
ALP SERPL-CCNC: 47 U/L (ref 40–130)
ALT SERPL-CCNC: 7 U/L (ref 0–33)
ANION GAP SERPL CALCULATED.3IONS-SCNC: 6 MEQ/L (ref 9–15)
AST SERPL-CCNC: 14 U/L (ref 0–35)
BASOPHILS # BLD: 0 K/UL (ref 0–0.2)
BASOPHILS NFR BLD: 0.3 %
BILIRUB SERPL-MCNC: 1 MG/DL (ref 0.2–0.7)
BUN SERPL-MCNC: 8 MG/DL (ref 6–20)
CALCIUM SERPL-MCNC: 8.7 MG/DL (ref 8.5–9.9)
CHLORIDE SERPL-SCNC: 95 MEQ/L (ref 95–107)
CO2 SERPL-SCNC: 39 MEQ/L (ref 20–31)
CREAT SERPL-MCNC: 0.5 MG/DL (ref 0.5–0.9)
EOSINOPHIL # BLD: 0.2 K/UL (ref 0–0.7)
EOSINOPHIL NFR BLD: 2.6 %
ERYTHROCYTE [DISTWIDTH] IN BLOOD BY AUTOMATED COUNT: 15.1 % (ref 11.5–14.5)
GLOBULIN SER CALC-MCNC: 3.6 G/DL (ref 2.3–3.5)
GLUCOSE SERPL-MCNC: 118 MG/DL (ref 70–99)
HCT VFR BLD AUTO: 44.8 % (ref 37–47)
HGB BLD-MCNC: 13.3 G/DL (ref 12–16)
LYMPHOCYTES # BLD: 1.1 K/UL (ref 1–4.8)
LYMPHOCYTES NFR BLD: 18 %
MCH RBC QN AUTO: 25.1 PG (ref 27–31.3)
MCHC RBC AUTO-ENTMCNC: 29.7 % (ref 33–37)
MCV RBC AUTO: 84.5 FL (ref 79.4–94.8)
MONOCYTES # BLD: 0.6 K/UL (ref 0.2–0.8)
MONOCYTES NFR BLD: 10.3 %
NEUTROPHILS # BLD: 4.3 K/UL (ref 1.4–6.5)
NEUTS SEG NFR BLD: 68.6 %
PLATELET # BLD AUTO: 143 K/UL (ref 130–400)
POTASSIUM SERPL-SCNC: 4.2 MEQ/L (ref 3.4–4.9)
PROT SERPL-MCNC: 6.8 G/DL (ref 6.3–8)
RBC # BLD AUTO: 5.3 M/UL (ref 4.2–5.4)
SODIUM SERPL-SCNC: 140 MEQ/L (ref 135–144)
WBC # BLD AUTO: 6.2 K/UL (ref 4.8–10.8)

## 2024-11-09 PROCEDURE — 94640 AIRWAY INHALATION TREATMENT: CPT

## 2024-11-09 PROCEDURE — 6370000000 HC RX 637 (ALT 250 FOR IP): Performed by: INTERNAL MEDICINE

## 2024-11-09 PROCEDURE — 99232 SBSQ HOSP IP/OBS MODERATE 35: CPT | Performed by: INTERNAL MEDICINE

## 2024-11-09 PROCEDURE — 6360000002 HC RX W HCPCS: Performed by: INTERNAL MEDICINE

## 2024-11-09 PROCEDURE — 2580000003 HC RX 258: Performed by: INTERNAL MEDICINE

## 2024-11-09 PROCEDURE — 94150 VITAL CAPACITY TEST: CPT

## 2024-11-09 PROCEDURE — 94660 CPAP INITIATION&MGMT: CPT

## 2024-11-09 PROCEDURE — 6370000000 HC RX 637 (ALT 250 FOR IP)

## 2024-11-09 PROCEDURE — 1210000000 HC MED SURG R&B

## 2024-11-09 PROCEDURE — 2700000000 HC OXYGEN THERAPY PER DAY

## 2024-11-09 PROCEDURE — 85025 COMPLETE CBC W/AUTO DIFF WBC: CPT

## 2024-11-09 PROCEDURE — 2500000003 HC RX 250 WO HCPCS: Performed by: INTERNAL MEDICINE

## 2024-11-09 PROCEDURE — 94761 N-INVAS EAR/PLS OXIMETRY MLT: CPT

## 2024-11-09 PROCEDURE — 80053 COMPREHEN METABOLIC PANEL: CPT

## 2024-11-09 PROCEDURE — 36415 COLL VENOUS BLD VENIPUNCTURE: CPT

## 2024-11-09 RX ORDER — DOXYCYCLINE HYCLATE 100 MG
100 TABLET ORAL 2 TIMES DAILY
Qty: 10 TABLET | Refills: 0 | Status: SHIPPED | OUTPATIENT
Start: 2024-11-09 | End: 2024-11-14

## 2024-11-09 RX ORDER — HYDROXYZINE HYDROCHLORIDE 50 MG/ML
50 INJECTION, SOLUTION INTRAMUSCULAR ONCE
Status: COMPLETED | OUTPATIENT
Start: 2024-11-10 | End: 2024-11-10

## 2024-11-09 RX ORDER — FUROSEMIDE 40 MG/1
40 TABLET ORAL DAILY
Qty: 60 TABLET | Refills: 3 | Status: SHIPPED | OUTPATIENT
Start: 2024-11-09

## 2024-11-09 RX ADMIN — BENZONATATE 100 MG: 100 CAPSULE ORAL at 14:56

## 2024-11-09 RX ADMIN — Medication 10 ML: at 21:01

## 2024-11-09 RX ADMIN — DOXYCYCLINE 100 MG: 100 INJECTION, POWDER, LYOPHILIZED, FOR SOLUTION INTRAVENOUS at 08:26

## 2024-11-09 RX ADMIN — IPRATROPIUM BROMIDE AND ALBUTEROL SULFATE 1 DOSE: 2.5; .5 SOLUTION RESPIRATORY (INHALATION) at 15:48

## 2024-11-09 RX ADMIN — LEVOTHYROXINE SODIUM 50 MCG: 0.05 TABLET ORAL at 06:34

## 2024-11-09 RX ADMIN — ENOXAPARIN SODIUM 60 MG: 100 INJECTION SUBCUTANEOUS at 07:59

## 2024-11-09 RX ADMIN — TRAMADOL HYDROCHLORIDE 50 MG: 50 TABLET ORAL at 21:01

## 2024-11-09 RX ADMIN — Medication 500 MG: at 07:59

## 2024-11-09 RX ADMIN — Medication 2000 UNITS: at 08:03

## 2024-11-09 RX ADMIN — DOXYCYCLINE 100 MG: 100 INJECTION, POWDER, LYOPHILIZED, FOR SOLUTION INTRAVENOUS at 21:02

## 2024-11-09 RX ADMIN — FERROUS SULFATE TAB 325 MG (65 MG ELEMENTAL FE) 325 MG: 325 (65 FE) TAB at 07:59

## 2024-11-09 RX ADMIN — BENZONATATE 100 MG: 100 CAPSULE ORAL at 08:08

## 2024-11-09 RX ADMIN — ENOXAPARIN SODIUM 60 MG: 100 INJECTION SUBCUTANEOUS at 21:01

## 2024-11-09 RX ADMIN — Medication 10 ML: at 08:00

## 2024-11-09 RX ADMIN — BENZONATATE 100 MG: 100 CAPSULE ORAL at 23:57

## 2024-11-09 RX ADMIN — CYANOCOBALAMIN TAB 500 MCG 500 MCG: 500 TAB at 07:59

## 2024-11-09 RX ADMIN — TRAMADOL HYDROCHLORIDE 50 MG: 50 TABLET ORAL at 06:34

## 2024-11-09 RX ADMIN — BUPROPION HYDROCHLORIDE 300 MG: 150 TABLET, EXTENDED RELEASE ORAL at 07:59

## 2024-11-09 ASSESSMENT — PAIN DESCRIPTION - ORIENTATION: ORIENTATION: MID;LOWER;POSTERIOR

## 2024-11-09 ASSESSMENT — PAIN SCALES - GENERAL
PAINLEVEL_OUTOF10: 9
PAINLEVEL_OUTOF10: 8

## 2024-11-09 ASSESSMENT — PAIN DESCRIPTION - LOCATION
LOCATION: BACK;KNEE
LOCATION: BACK

## 2024-11-09 NOTE — PLAN OF CARE
Problem: Discharge Planning  Goal: Discharge to home or other facility with appropriate resources  11/8/2024 2009 by Nery Benoit, RN  Outcome: Progressing  11/8/2024 1119 by Beatrice Rmasey, RN  Outcome: Progressing     Problem: Skin/Tissue Integrity  Goal: Absence of new skin breakdown  Description: 1.  Monitor for areas of redness and/or skin breakdown  2.  Assess vascular access sites hourly  3.  Every 4-6 hours minimum:  Change oxygen saturation probe site  4.  Every 4-6 hours:  If on nasal continuous positive airway pressure, respiratory therapy assess nares and determine need for appliance change or resting period.  11/8/2024 2009 by Nery Benoit, RN  Outcome: Progressing  11/8/2024 1119 by Beatrice Ramsey, RN  Outcome: Progressing     Problem: Safety - Adult  Goal: Free from fall injury  11/8/2024 1119 by Beatrice Ramsey, RN  Outcome: Progressing     Problem: Pain  Goal: Verbalizes/displays adequate comfort level or baseline comfort level  11/8/2024 1119 by Beatrice Ramsey, RN  Outcome: Progressing

## 2024-11-10 LAB
ALBUMIN SERPL-MCNC: 3.2 G/DL (ref 3.5–4.6)
ALP SERPL-CCNC: 48 U/L (ref 40–130)
ALT SERPL-CCNC: 8 U/L (ref 0–33)
ANION GAP SERPL CALCULATED.3IONS-SCNC: 5 MEQ/L (ref 9–15)
AST SERPL-CCNC: 15 U/L (ref 0–35)
BASOPHILS # BLD: 0 K/UL (ref 0–0.2)
BASOPHILS NFR BLD: 0.6 %
BILIRUB SERPL-MCNC: 0.8 MG/DL (ref 0.2–0.7)
BUN SERPL-MCNC: 8 MG/DL (ref 6–20)
CALCIUM SERPL-MCNC: 9 MG/DL (ref 8.5–9.9)
CHLORIDE SERPL-SCNC: 93 MEQ/L (ref 95–107)
CO2 SERPL-SCNC: 39 MEQ/L (ref 20–31)
CREAT SERPL-MCNC: 0.48 MG/DL (ref 0.5–0.9)
EOSINOPHIL # BLD: 0.1 K/UL (ref 0–0.7)
EOSINOPHIL NFR BLD: 2.6 %
ERYTHROCYTE [DISTWIDTH] IN BLOOD BY AUTOMATED COUNT: 15.3 % (ref 11.5–14.5)
GLOBULIN SER CALC-MCNC: 3.9 G/DL (ref 2.3–3.5)
GLUCOSE SERPL-MCNC: 113 MG/DL (ref 70–99)
HCT VFR BLD AUTO: 45.1 % (ref 37–47)
HGB BLD-MCNC: 13 G/DL (ref 12–16)
LYMPHOCYTES # BLD: 1.1 K/UL (ref 1–4.8)
LYMPHOCYTES NFR BLD: 21.1 %
MCH RBC QN AUTO: 24.4 PG (ref 27–31.3)
MCHC RBC AUTO-ENTMCNC: 28.8 % (ref 33–37)
MCV RBC AUTO: 84.6 FL (ref 79.4–94.8)
MONOCYTES # BLD: 0.5 K/UL (ref 0.2–0.8)
MONOCYTES NFR BLD: 10.1 %
NEUTROPHILS # BLD: 3.5 K/UL (ref 1.4–6.5)
NEUTS SEG NFR BLD: 65.4 %
PLATELET # BLD AUTO: 147 K/UL (ref 130–400)
POTASSIUM SERPL-SCNC: 4.1 MEQ/L (ref 3.4–4.9)
PROT SERPL-MCNC: 7.1 G/DL (ref 6.3–8)
RBC # BLD AUTO: 5.33 M/UL (ref 4.2–5.4)
SODIUM SERPL-SCNC: 137 MEQ/L (ref 135–144)
WBC # BLD AUTO: 5.4 K/UL (ref 4.8–10.8)

## 2024-11-10 PROCEDURE — 6360000002 HC RX W HCPCS: Performed by: INTERNAL MEDICINE

## 2024-11-10 PROCEDURE — 2580000003 HC RX 258: Performed by: INTERNAL MEDICINE

## 2024-11-10 PROCEDURE — 6360000002 HC RX W HCPCS

## 2024-11-10 PROCEDURE — 6370000000 HC RX 637 (ALT 250 FOR IP): Performed by: INTERNAL MEDICINE

## 2024-11-10 PROCEDURE — 80053 COMPREHEN METABOLIC PANEL: CPT

## 2024-11-10 PROCEDURE — 99232 SBSQ HOSP IP/OBS MODERATE 35: CPT | Performed by: INTERNAL MEDICINE

## 2024-11-10 PROCEDURE — 85025 COMPLETE CBC W/AUTO DIFF WBC: CPT

## 2024-11-10 PROCEDURE — 2500000003 HC RX 250 WO HCPCS: Performed by: INTERNAL MEDICINE

## 2024-11-10 PROCEDURE — 94660 CPAP INITIATION&MGMT: CPT

## 2024-11-10 PROCEDURE — 2700000000 HC OXYGEN THERAPY PER DAY

## 2024-11-10 PROCEDURE — 1210000000 HC MED SURG R&B

## 2024-11-10 PROCEDURE — 6370000000 HC RX 637 (ALT 250 FOR IP): Performed by: FAMILY MEDICINE

## 2024-11-10 PROCEDURE — 36415 COLL VENOUS BLD VENIPUNCTURE: CPT

## 2024-11-10 RX ORDER — ALPRAZOLAM 0.5 MG
0.5 TABLET ORAL NIGHTLY PRN
Status: DISCONTINUED | OUTPATIENT
Start: 2024-11-10 | End: 2024-11-13 | Stop reason: HOSPADM

## 2024-11-10 RX ADMIN — CYANOCOBALAMIN TAB 500 MCG 500 MCG: 500 TAB at 07:56

## 2024-11-10 RX ADMIN — DOXYCYCLINE 100 MG: 100 INJECTION, POWDER, LYOPHILIZED, FOR SOLUTION INTRAVENOUS at 08:04

## 2024-11-10 RX ADMIN — BENZONATATE 100 MG: 100 CAPSULE ORAL at 07:57

## 2024-11-10 RX ADMIN — ALPRAZOLAM 0.5 MG: 0.5 TABLET ORAL at 20:29

## 2024-11-10 RX ADMIN — FUROSEMIDE 40 MG: 40 TABLET ORAL at 07:56

## 2024-11-10 RX ADMIN — TRAMADOL HYDROCHLORIDE 50 MG: 50 TABLET ORAL at 16:31

## 2024-11-10 RX ADMIN — HYDROXYZINE HYDROCHLORIDE 50 MG: 50 INJECTION, SOLUTION INTRAMUSCULAR at 00:55

## 2024-11-10 RX ADMIN — ACETAMINOPHEN 325MG 650 MG: 325 TABLET ORAL at 20:29

## 2024-11-10 RX ADMIN — LEVOTHYROXINE SODIUM 50 MCG: 0.05 TABLET ORAL at 07:41

## 2024-11-10 RX ADMIN — SPIRONOLACTONE 50 MG: 25 TABLET ORAL at 07:57

## 2024-11-10 RX ADMIN — Medication 500 MG: at 07:56

## 2024-11-10 RX ADMIN — FERROUS SULFATE TAB 325 MG (65 MG ELEMENTAL FE) 325 MG: 325 (65 FE) TAB at 07:56

## 2024-11-10 RX ADMIN — Medication 2000 UNITS: at 07:56

## 2024-11-10 RX ADMIN — BUPROPION HYDROCHLORIDE 300 MG: 150 TABLET, EXTENDED RELEASE ORAL at 07:56

## 2024-11-10 RX ADMIN — ENOXAPARIN SODIUM 60 MG: 100 INJECTION SUBCUTANEOUS at 07:57

## 2024-11-10 RX ADMIN — DOXYCYCLINE 100 MG: 100 INJECTION, POWDER, LYOPHILIZED, FOR SOLUTION INTRAVENOUS at 20:33

## 2024-11-10 RX ADMIN — ENOXAPARIN SODIUM 60 MG: 100 INJECTION SUBCUTANEOUS at 20:29

## 2024-11-10 RX ADMIN — Medication 10 ML: at 20:30

## 2024-11-10 RX ADMIN — Medication 10 ML: at 07:58

## 2024-11-10 ASSESSMENT — PAIN DESCRIPTION - DESCRIPTORS: DESCRIPTORS: ACHING

## 2024-11-10 ASSESSMENT — PAIN DESCRIPTION - LOCATION
LOCATION: KNEE
LOCATION: BACK

## 2024-11-10 ASSESSMENT — PAIN SCALES - GENERAL
PAINLEVEL_OUTOF10: 0
PAINLEVEL_OUTOF10: 6
PAINLEVEL_OUTOF10: 9
PAINLEVEL_OUTOF10: 3

## 2024-11-10 ASSESSMENT — PAIN DESCRIPTION - ORIENTATION
ORIENTATION: MID
ORIENTATION: RIGHT;LEFT

## 2024-11-10 NOTE — PLAN OF CARE
Problem: Discharge Planning  Goal: Discharge to home or other facility with appropriate resources  Outcome: Progressing  Flowsheets (Taken 11/9/2024 9677)  Discharge to home or other facility with appropriate resources: Identify barriers to discharge with patient and caregiver     Problem: Skin/Tissue Integrity  Goal: Absence of new skin breakdown  Description: 1.  Monitor for areas of redness and/or skin breakdown  2.  Assess vascular access sites hourly  3.  Every 4-6 hours minimum:  Change oxygen saturation probe site  4.  Every 4-6 hours:  If on nasal continuous positive airway pressure, respiratory therapy assess nares and determine need for appliance change or resting period.  Outcome: Progressing

## 2024-11-11 LAB
ALBUMIN SERPL-MCNC: 3.3 G/DL (ref 3.5–4.6)
ALP SERPL-CCNC: 48 U/L (ref 40–130)
ALT SERPL-CCNC: 8 U/L (ref 0–33)
ANION GAP SERPL CALCULATED.3IONS-SCNC: 5 MEQ/L (ref 9–15)
AST SERPL-CCNC: 16 U/L (ref 0–35)
BACTERIA BLD CULT ORG #2: NORMAL
BACTERIA BLD CULT: NORMAL
BASOPHILS # BLD: 0 K/UL (ref 0–0.2)
BASOPHILS NFR BLD: 0.4 %
BILIRUB SERPL-MCNC: 0.7 MG/DL (ref 0.2–0.7)
BUN SERPL-MCNC: 9 MG/DL (ref 6–20)
CALCIUM SERPL-MCNC: 9 MG/DL (ref 8.5–9.9)
CHLORIDE SERPL-SCNC: 93 MEQ/L (ref 95–107)
CO2 SERPL-SCNC: 39 MEQ/L (ref 20–31)
CREAT SERPL-MCNC: 0.47 MG/DL (ref 0.5–0.9)
EOSINOPHIL # BLD: 0.2 K/UL (ref 0–0.7)
EOSINOPHIL NFR BLD: 3.1 %
ERYTHROCYTE [DISTWIDTH] IN BLOOD BY AUTOMATED COUNT: 15.1 % (ref 11.5–14.5)
GLOBULIN SER CALC-MCNC: 3.9 G/DL (ref 2.3–3.5)
GLUCOSE SERPL-MCNC: 113 MG/DL (ref 70–99)
HCT VFR BLD AUTO: 44.9 % (ref 37–47)
HGB BLD-MCNC: 13.5 G/DL (ref 12–16)
LYMPHOCYTES # BLD: 1.1 K/UL (ref 1–4.8)
LYMPHOCYTES NFR BLD: 21.1 %
MCH RBC QN AUTO: 25.5 PG (ref 27–31.3)
MCHC RBC AUTO-ENTMCNC: 30.1 % (ref 33–37)
MCV RBC AUTO: 84.9 FL (ref 79.4–94.8)
MONOCYTES # BLD: 0.6 K/UL (ref 0.2–0.8)
MONOCYTES NFR BLD: 11.1 %
NEUTROPHILS # BLD: 3.3 K/UL (ref 1.4–6.5)
NEUTS SEG NFR BLD: 63.9 %
PLATELET # BLD AUTO: 149 K/UL (ref 130–400)
POTASSIUM SERPL-SCNC: 4.2 MEQ/L (ref 3.4–4.9)
PROT SERPL-MCNC: 7.2 G/DL (ref 6.3–8)
RBC # BLD AUTO: 5.29 M/UL (ref 4.2–5.4)
SODIUM SERPL-SCNC: 137 MEQ/L (ref 135–144)
WBC # BLD AUTO: 5.2 K/UL (ref 4.8–10.8)

## 2024-11-11 PROCEDURE — 99232 SBSQ HOSP IP/OBS MODERATE 35: CPT | Performed by: INTERNAL MEDICINE

## 2024-11-11 PROCEDURE — 6370000000 HC RX 637 (ALT 250 FOR IP): Performed by: INTERNAL MEDICINE

## 2024-11-11 PROCEDURE — 94660 CPAP INITIATION&MGMT: CPT

## 2024-11-11 PROCEDURE — 6360000002 HC RX W HCPCS: Performed by: INTERNAL MEDICINE

## 2024-11-11 PROCEDURE — 5A09357 ASSISTANCE WITH RESPIRATORY VENTILATION, LESS THAN 24 CONSECUTIVE HOURS, CONTINUOUS POSITIVE AIRWAY PRESSURE: ICD-10-PCS | Performed by: INTERNAL MEDICINE

## 2024-11-11 PROCEDURE — 97535 SELF CARE MNGMENT TRAINING: CPT

## 2024-11-11 PROCEDURE — 80053 COMPREHEN METABOLIC PANEL: CPT

## 2024-11-11 PROCEDURE — 2700000000 HC OXYGEN THERAPY PER DAY

## 2024-11-11 PROCEDURE — 36415 COLL VENOUS BLD VENIPUNCTURE: CPT

## 2024-11-11 PROCEDURE — 2580000003 HC RX 258: Performed by: INTERNAL MEDICINE

## 2024-11-11 PROCEDURE — 85025 COMPLETE CBC W/AUTO DIFF WBC: CPT

## 2024-11-11 PROCEDURE — 1210000000 HC MED SURG R&B

## 2024-11-11 PROCEDURE — 6370000000 HC RX 637 (ALT 250 FOR IP): Performed by: FAMILY MEDICINE

## 2024-11-11 RX ORDER — DOXYCYCLINE 100 MG/1
100 CAPSULE ORAL EVERY 12 HOURS SCHEDULED
Status: DISCONTINUED | OUTPATIENT
Start: 2024-11-11 | End: 2024-11-13 | Stop reason: HOSPADM

## 2024-11-11 RX ADMIN — ENOXAPARIN SODIUM 60 MG: 100 INJECTION SUBCUTANEOUS at 08:41

## 2024-11-11 RX ADMIN — FUROSEMIDE 40 MG: 40 TABLET ORAL at 08:42

## 2024-11-11 RX ADMIN — TRAMADOL HYDROCHLORIDE 50 MG: 50 TABLET ORAL at 05:37

## 2024-11-11 RX ADMIN — ENOXAPARIN SODIUM 60 MG: 100 INJECTION SUBCUTANEOUS at 21:04

## 2024-11-11 RX ADMIN — LEVOTHYROXINE SODIUM 50 MCG: 0.05 TABLET ORAL at 05:37

## 2024-11-11 RX ADMIN — Medication 10 ML: at 08:47

## 2024-11-11 RX ADMIN — DOXYCYCLINE HYCLATE 100 MG: 100 CAPSULE ORAL at 11:38

## 2024-11-11 RX ADMIN — TRAMADOL HYDROCHLORIDE 50 MG: 50 TABLET ORAL at 18:33

## 2024-11-11 RX ADMIN — TRAMADOL HYDROCHLORIDE 50 MG: 50 TABLET ORAL at 12:26

## 2024-11-11 RX ADMIN — Medication 500 MG: at 08:41

## 2024-11-11 RX ADMIN — Medication 2000 UNITS: at 08:46

## 2024-11-11 RX ADMIN — CYANOCOBALAMIN TAB 500 MCG 500 MCG: 500 TAB at 08:42

## 2024-11-11 RX ADMIN — FERROUS SULFATE TAB 325 MG (65 MG ELEMENTAL FE) 325 MG: 325 (65 FE) TAB at 08:42

## 2024-11-11 RX ADMIN — DOXYCYCLINE HYCLATE 100 MG: 100 CAPSULE ORAL at 21:04

## 2024-11-11 RX ADMIN — BENZONATATE 100 MG: 100 CAPSULE ORAL at 18:32

## 2024-11-11 RX ADMIN — ALPRAZOLAM 0.5 MG: 0.5 TABLET ORAL at 21:51

## 2024-11-11 RX ADMIN — BUPROPION HYDROCHLORIDE 300 MG: 150 TABLET, EXTENDED RELEASE ORAL at 08:42

## 2024-11-11 RX ADMIN — SPIRONOLACTONE 50 MG: 25 TABLET ORAL at 08:42

## 2024-11-11 ASSESSMENT — PAIN DESCRIPTION - ORIENTATION
ORIENTATION: LOWER
ORIENTATION: RIGHT

## 2024-11-11 ASSESSMENT — PAIN SCALES - GENERAL
PAINLEVEL_OUTOF10: 0
PAINLEVEL_OUTOF10: 7
PAINLEVEL_OUTOF10: 5
PAINLEVEL_OUTOF10: 6
PAINLEVEL_OUTOF10: 0
PAINLEVEL_OUTOF10: 2
PAINLEVEL_OUTOF10: 2
PAINLEVEL_OUTOF10: 0
PAINLEVEL_OUTOF10: 7

## 2024-11-11 ASSESSMENT — ENCOUNTER SYMPTOMS
VOMITING: 0
NAUSEA: 0
SHORTNESS OF BREATH: 0
COUGH: 0
DIARRHEA: 0

## 2024-11-11 ASSESSMENT — PAIN - FUNCTIONAL ASSESSMENT
PAIN_FUNCTIONAL_ASSESSMENT: PREVENTS OR INTERFERES SOME ACTIVE ACTIVITIES AND ADLS
PAIN_FUNCTIONAL_ASSESSMENT: PREVENTS OR INTERFERES SOME ACTIVE ACTIVITIES AND ADLS

## 2024-11-11 ASSESSMENT — PAIN DESCRIPTION - LOCATION: LOCATION: BACK

## 2024-11-11 ASSESSMENT — PAIN DESCRIPTION - DESCRIPTORS
DESCRIPTORS: THROBBING
DESCRIPTORS: DISCOMFORT;HEAVINESS

## 2024-11-11 NOTE — CARE COORDINATION
This LSW visited patient at bedside this afternoon. Patient and I discussed discharge plans.   Patient is requesting to have referral sent to [facility].  I sent referral to Anaya, admissions liaison at [facility].  Awaiting response at this time.  Electronically signed by BONG Ramos on 11/11/24 at 12:30 PM EST   This LSW notified patient that her referral has been accepted at [facility]. Patient will require insurance authorization prior to discharge.  Electronically signed by BONG Ramos on 11/12/24 at 1:02 PM EST   This LSW did arrange transportation for patient to be transferred to [facility] tomorrow, 11/13/2024.  Transport is scheduled for 2:00pm.  Patient, patients family, REBECA Merlos. Anaya admissions liaison at [facility] are all aware.  Electronically signed by BONG Ramos on 11/12/24 at 3:52 PM EST   [id number] completed for SNF transfer.  This LSW completed 2nd IMM with patient, copy provided.  Electronically signed by BONG Ramos on 11/12/24 at 4:10 PM EST

## 2024-11-12 LAB
ALBUMIN SERPL-MCNC: 3.4 G/DL (ref 3.5–4.6)
ALP SERPL-CCNC: 48 U/L (ref 40–130)
ALT SERPL-CCNC: 9 U/L (ref 0–33)
ANION GAP SERPL CALCULATED.3IONS-SCNC: 7 MEQ/L (ref 9–15)
ANISOCYTOSIS BLD QL SMEAR: ABNORMAL
AST SERPL-CCNC: 16 U/L (ref 0–35)
BASOPHILS # BLD: 0 K/UL (ref 0–0.2)
BASOPHILS NFR BLD: 0.8 %
BILIRUB SERPL-MCNC: 0.8 MG/DL (ref 0.2–0.7)
BUN SERPL-MCNC: 9 MG/DL (ref 6–20)
BURR CELLS: ABNORMAL
CALCIUM SERPL-MCNC: 9.2 MG/DL (ref 8.5–9.9)
CHLORIDE SERPL-SCNC: 94 MEQ/L (ref 95–107)
CO2 SERPL-SCNC: 38 MEQ/L (ref 20–31)
CREAT SERPL-MCNC: 0.52 MG/DL (ref 0.5–0.9)
EOSINOPHIL # BLD: 0.2 K/UL (ref 0–0.7)
EOSINOPHIL NFR BLD: 3.2 %
ERYTHROCYTE [DISTWIDTH] IN BLOOD BY AUTOMATED COUNT: 15.2 % (ref 11.5–14.5)
GLOBULIN SER CALC-MCNC: 4 G/DL (ref 2.3–3.5)
GLUCOSE SERPL-MCNC: 116 MG/DL (ref 70–99)
HCT VFR BLD AUTO: 47.2 % (ref 37–47)
HGB BLD-MCNC: 13.8 G/DL (ref 12–16)
LYMPHOCYTES # BLD: 1.1 K/UL (ref 1–4.8)
LYMPHOCYTES NFR BLD: 21.7 %
MCH RBC QN AUTO: 24.6 PG (ref 27–31.3)
MCHC RBC AUTO-ENTMCNC: 29.2 % (ref 33–37)
MCV RBC AUTO: 84.3 FL (ref 79.4–94.8)
MONOCYTES # BLD: 0.5 K/UL (ref 0.2–0.8)
MONOCYTES NFR BLD: 10.3 %
NEUTROPHILS # BLD: 3.4 K/UL (ref 1.4–6.5)
NEUTS SEG NFR BLD: 63.8 %
PLATELET # BLD AUTO: 136 K/UL (ref 130–400)
PLATELET BLD QL SMEAR: ABNORMAL
POIKILOCYTOSIS BLD QL SMEAR: ABNORMAL
POTASSIUM SERPL-SCNC: 4.2 MEQ/L (ref 3.4–4.9)
PROT SERPL-MCNC: 7.4 G/DL (ref 6.3–8)
RBC # BLD AUTO: 5.6 M/UL (ref 4.2–5.4)
SLIDE REVIEW: ABNORMAL
SODIUM SERPL-SCNC: 139 MEQ/L (ref 135–144)
WBC # BLD AUTO: 5.3 K/UL (ref 4.8–10.8)

## 2024-11-12 PROCEDURE — 80053 COMPREHEN METABOLIC PANEL: CPT

## 2024-11-12 PROCEDURE — 6370000000 HC RX 637 (ALT 250 FOR IP): Performed by: FAMILY MEDICINE

## 2024-11-12 PROCEDURE — 99232 SBSQ HOSP IP/OBS MODERATE 35: CPT | Performed by: INTERNAL MEDICINE

## 2024-11-12 PROCEDURE — 85025 COMPLETE CBC W/AUTO DIFF WBC: CPT

## 2024-11-12 PROCEDURE — 6360000002 HC RX W HCPCS: Performed by: INTERNAL MEDICINE

## 2024-11-12 PROCEDURE — 6370000000 HC RX 637 (ALT 250 FOR IP): Performed by: INTERNAL MEDICINE

## 2024-11-12 PROCEDURE — 1210000000 HC MED SURG R&B

## 2024-11-12 PROCEDURE — 36415 COLL VENOUS BLD VENIPUNCTURE: CPT

## 2024-11-12 PROCEDURE — 94660 CPAP INITIATION&MGMT: CPT

## 2024-11-12 PROCEDURE — 2700000000 HC OXYGEN THERAPY PER DAY

## 2024-11-12 RX ADMIN — ENOXAPARIN SODIUM 60 MG: 100 INJECTION SUBCUTANEOUS at 10:13

## 2024-11-12 RX ADMIN — TRAMADOL HYDROCHLORIDE 50 MG: 50 TABLET ORAL at 21:31

## 2024-11-12 RX ADMIN — FUROSEMIDE 40 MG: 40 TABLET ORAL at 10:15

## 2024-11-12 RX ADMIN — FERROUS SULFATE TAB 325 MG (65 MG ELEMENTAL FE) 325 MG: 325 (65 FE) TAB at 10:15

## 2024-11-12 RX ADMIN — ALPRAZOLAM 0.5 MG: 0.5 TABLET ORAL at 22:20

## 2024-11-12 RX ADMIN — BUPROPION HYDROCHLORIDE 300 MG: 150 TABLET, EXTENDED RELEASE ORAL at 10:15

## 2024-11-12 RX ADMIN — LEVOTHYROXINE SODIUM 50 MCG: 0.05 TABLET ORAL at 05:58

## 2024-11-12 RX ADMIN — SPIRONOLACTONE 50 MG: 25 TABLET ORAL at 10:15

## 2024-11-12 RX ADMIN — ACETAMINOPHEN 325MG 650 MG: 325 TABLET ORAL at 12:41

## 2024-11-12 RX ADMIN — TRAMADOL HYDROCHLORIDE 50 MG: 50 TABLET ORAL at 10:13

## 2024-11-12 RX ADMIN — DOXYCYCLINE HYCLATE 100 MG: 100 CAPSULE ORAL at 21:02

## 2024-11-12 RX ADMIN — CYANOCOBALAMIN TAB 500 MCG 500 MCG: 500 TAB at 10:15

## 2024-11-12 RX ADMIN — DOXYCYCLINE HYCLATE 100 MG: 100 CAPSULE ORAL at 10:15

## 2024-11-12 RX ADMIN — ENOXAPARIN SODIUM 60 MG: 100 INJECTION SUBCUTANEOUS at 21:02

## 2024-11-12 RX ADMIN — Medication 2000 UNITS: at 10:15

## 2024-11-12 RX ADMIN — Medication 500 MG: at 09:00

## 2024-11-12 ASSESSMENT — PAIN DESCRIPTION - ORIENTATION: ORIENTATION: RIGHT;LEFT

## 2024-11-12 ASSESSMENT — PAIN SCALES - GENERAL
PAINLEVEL_OUTOF10: 8
PAINLEVEL_OUTOF10: 6
PAINLEVEL_OUTOF10: 3
PAINLEVEL_OUTOF10: 4
PAINLEVEL_OUTOF10: 6
PAINLEVEL_OUTOF10: 4

## 2024-11-12 ASSESSMENT — PAIN DESCRIPTION - LOCATION
LOCATION: KNEE
LOCATION: BACK;KNEE
LOCATION: BACK;KNEE

## 2024-11-12 ASSESSMENT — PAIN DESCRIPTION - DESCRIPTORS: DESCRIPTORS: ACHING;DISCOMFORT

## 2024-11-12 ASSESSMENT — PAIN SCALES - WONG BAKER
WONGBAKER_NUMERICALRESPONSE: HURTS A LITTLE BIT
WONGBAKER_NUMERICALRESPONSE: NO HURT

## 2024-11-12 NOTE — DISCHARGE INSTR - DIET

## 2024-11-12 NOTE — DISCHARGE SUMMARY
Discharge Summary    Date: 11/12/2024  Patient Name: Ana Laura Gongora    YOB: 1970     Age: 54 y.o.    Admit Date: 11/6/2024  Discharge Date: 11/12/2024  Discharge Condition: Fair    Admission Diagnosis  Open leg wound, left, initial encounter [S81.802A];Wound infection [T14.8XXA, L08.9]      Discharge Diagnosis  Principal Problem:    Open leg wound, left, initial encounter  Active Problems:    Obesity, morbid (more than 100 lbs over ideal weight or BMI > 40)    Chronic acquired lymphedema    Left leg cellulitis    Wound infection    Shortness of breath    Acute diastolic heart failure (HCC)    Acute hypoxic respiratory failure  Resolved Problems:    * No resolved hospital problems. *      Hospital Stay  Narrative of Hospital Course:  Patient was admitted and treated for the following conditions.  Patient started on p.o. Lasix EF is normal.  Patient is to follow cardiology as outpatient.    Dyspnea-due to acute on chronic diastolic dysfunction    Bilateral lower extremity wound infection/cellular-worse on the left will be po doxy  Severe morbid obesity-.78  Depression  Lower extremity chronic lymphedema      Consultants:  IP CONSULT TO INFECTIOUS DISEASES  IP CONSULT TO PODIATRY  PHARMACY TO DOSE VANCOMYCIN  IP CONSULT TO SPIRITUAL SERVICES  IP CONSULT TO PULMONOLOGY  IP CONSULT TO DIETITIAN  IP CONSULT TO BARIATRICS    Surgeries/procedures Performed:      Treatments:            Discharge Plan/Disposition:  Home    Hospital/Incidental Findings Requiring Follow Up:    Patient Instructions:    Diet:    Activity:  For number of days (if applicable):      Other Instructions:    Provider Follow-Up:   No follow-ups on file.     Significant Diagnostic Studies:    Recent Labs:  Admission on 11/06/2024  No results displayed because visit has over 200 results.    ------------    Radiology last 7 days:  XR TIBIA FIBULA LEFT (2 VIEWS)    Result Date: 11/5/2024  No acute osseous abnormality. No radiographic

## 2024-11-12 NOTE — DISCHARGE INSTR - COC
wrap;Alginate with Ag;Xeroform 11/11/24 1001   Wound Assessment Pink/red;Subcutaneous 11/11/24 1001   Drainage Amount Scant (moist but unmeasurable) 11/11/24 1001   Drainage Description Serosanguinous 11/11/24 1001   Odor None 11/11/24 1001   Margins Undefined edges 11/11/24 1001   Number of days: 6        Elimination:  Continence:   Bowel: Yes  Bladder: Yes  Urinary Catheter: None   Colostomy/Ileostomy/Ileal Conduit: No       Date of Last BM: 11/12    Intake/Output Summary (Last 24 hours) at 11/12/2024 1304  Last data filed at 11/12/2024 0601  Gross per 24 hour   Intake 1385.32 ml   Output 3425 ml   Net -2039.68 ml     I/O last 3 completed shifts:  In: 2505.3 [P.O.:2400; I.V.:40; IV Piggyback:65.3]  Out: 4925 [Urine:4925]    Safety Concerns:     At Risk for Falls    Impairments/Disabilities:      Vision    Nutrition Therapy:  Current Nutrition Therapy:   - Oral Diet:  Regular low sodium 2gm    Routes of Feeding: Oral  Liquids: Thin Liquids  Daily Fluid Restriction: no  Last Modified Barium Swallow with Video (Video Swallowing Test): not done    Treatments at the Time of Hospital Discharge:   Respiratory Treatments: 2L NC  Oxygen Therapy:  is on oxygen at 2 L/min per nasal cannula.  Ventilator:    - N/A    Rehab Therapies: Physical Therapy and Occupational Therapy  Weight Bearing Status/Restrictions: No weight bearing restrictions  Other Medical Equipment (for information only, NOT a DME order):  walker; BSC  Other Treatments: BLE dressing change-- xeroform, maxorb, krilex, acewrap to BLE daily    RN SIGNATURE:  Electronically signed by Karine Braga RN on 11/13/24 at 11:35 AM EST    CASE MANAGEMENT/SOCIAL WORK SECTION    Inpatient Status Date: ***    Readmission Risk Assessment Score:  Readmission Risk              Risk of Unplanned Readmission:  13           Discharging to Facility/ Agency   Name: [facility]   Address:  Phone: [phone]  Fax:      / signature: Electronically

## 2024-11-13 VITALS
OXYGEN SATURATION: 96 % | BODY MASS INDEX: 50.02 KG/M2 | SYSTOLIC BLOOD PRESSURE: 121 MMHG | DIASTOLIC BLOOD PRESSURE: 59 MMHG | HEART RATE: 95 BPM | HEIGHT: 64 IN | TEMPERATURE: 97.5 F | RESPIRATION RATE: 18 BRPM | WEIGHT: 293 LBS

## 2024-11-13 LAB
ALBUMIN SERPL-MCNC: 3.4 G/DL (ref 3.5–4.6)
ALP SERPL-CCNC: 46 U/L (ref 40–130)
ALT SERPL-CCNC: 8 U/L (ref 0–33)
ANION GAP SERPL CALCULATED.3IONS-SCNC: 6 MEQ/L (ref 9–15)
AST SERPL-CCNC: 15 U/L (ref 0–35)
BASOPHILS # BLD: 0 K/UL (ref 0–0.2)
BASOPHILS NFR BLD: 0.4 %
BILIRUB SERPL-MCNC: 0.7 MG/DL (ref 0.2–0.7)
BUN SERPL-MCNC: 9 MG/DL (ref 6–20)
CALCIUM SERPL-MCNC: 8.9 MG/DL (ref 8.5–9.9)
CHLORIDE SERPL-SCNC: 94 MEQ/L (ref 95–107)
CO2 SERPL-SCNC: 38 MEQ/L (ref 20–31)
CREAT SERPL-MCNC: 0.49 MG/DL (ref 0.5–0.9)
EOSINOPHIL # BLD: 0.1 K/UL (ref 0–0.7)
EOSINOPHIL NFR BLD: 2.4 %
ERYTHROCYTE [DISTWIDTH] IN BLOOD BY AUTOMATED COUNT: 15.2 % (ref 11.5–14.5)
GLOBULIN SER CALC-MCNC: 3.8 G/DL (ref 2.3–3.5)
GLUCOSE SERPL-MCNC: 119 MG/DL (ref 70–99)
HCT VFR BLD AUTO: 46.6 % (ref 37–47)
HGB BLD-MCNC: 13.9 G/DL (ref 12–16)
LYMPHOCYTES # BLD: 1.1 K/UL (ref 1–4.8)
LYMPHOCYTES NFR BLD: 20.2 %
MCH RBC QN AUTO: 25 PG (ref 27–31.3)
MCHC RBC AUTO-ENTMCNC: 29.8 % (ref 33–37)
MCV RBC AUTO: 83.8 FL (ref 79.4–94.8)
MONOCYTES # BLD: 0.6 K/UL (ref 0.2–0.8)
MONOCYTES NFR BLD: 10.7 %
NEUTROPHILS # BLD: 3.6 K/UL (ref 1.4–6.5)
NEUTS SEG NFR BLD: 65.9 %
PLATELET # BLD AUTO: 144 K/UL (ref 130–400)
POTASSIUM SERPL-SCNC: 4.3 MEQ/L (ref 3.4–4.9)
PROT SERPL-MCNC: 7.2 G/DL (ref 6.3–8)
RBC # BLD AUTO: 5.56 M/UL (ref 4.2–5.4)
SODIUM SERPL-SCNC: 138 MEQ/L (ref 135–144)
WBC # BLD AUTO: 5.5 K/UL (ref 4.8–10.8)

## 2024-11-13 PROCEDURE — 36415 COLL VENOUS BLD VENIPUNCTURE: CPT

## 2024-11-13 PROCEDURE — 6360000002 HC RX W HCPCS: Performed by: INTERNAL MEDICINE

## 2024-11-13 PROCEDURE — 6370000000 HC RX 637 (ALT 250 FOR IP): Performed by: INTERNAL MEDICINE

## 2024-11-13 PROCEDURE — 94660 CPAP INITIATION&MGMT: CPT

## 2024-11-13 PROCEDURE — 80053 COMPREHEN METABOLIC PANEL: CPT

## 2024-11-13 PROCEDURE — 99232 SBSQ HOSP IP/OBS MODERATE 35: CPT | Performed by: INTERNAL MEDICINE

## 2024-11-13 PROCEDURE — 2700000000 HC OXYGEN THERAPY PER DAY

## 2024-11-13 PROCEDURE — 85025 COMPLETE CBC W/AUTO DIFF WBC: CPT

## 2024-11-13 RX ADMIN — LEVOTHYROXINE SODIUM 50 MCG: 0.05 TABLET ORAL at 06:00

## 2024-11-13 RX ADMIN — ENOXAPARIN SODIUM 60 MG: 100 INJECTION SUBCUTANEOUS at 08:57

## 2024-11-13 RX ADMIN — CYANOCOBALAMIN TAB 500 MCG 500 MCG: 500 TAB at 08:58

## 2024-11-13 RX ADMIN — FERROUS SULFATE TAB 325 MG (65 MG ELEMENTAL FE) 325 MG: 325 (65 FE) TAB at 08:58

## 2024-11-13 RX ADMIN — BUPROPION HYDROCHLORIDE 300 MG: 150 TABLET, EXTENDED RELEASE ORAL at 08:57

## 2024-11-13 RX ADMIN — TRAMADOL HYDROCHLORIDE 50 MG: 50 TABLET ORAL at 13:49

## 2024-11-13 RX ADMIN — Medication 2000 UNITS: at 08:57

## 2024-11-13 RX ADMIN — Medication 500 MG: at 08:58

## 2024-11-13 RX ADMIN — ACETAMINOPHEN 325MG 650 MG: 325 TABLET ORAL at 08:58

## 2024-11-13 RX ADMIN — DOXYCYCLINE HYCLATE 100 MG: 100 CAPSULE ORAL at 08:58

## 2024-11-13 ASSESSMENT — PAIN SCALES - GENERAL
PAINLEVEL_OUTOF10: 0
PAINLEVEL_OUTOF10: 0
PAINLEVEL_OUTOF10: 3
PAINLEVEL_OUTOF10: 0
PAINLEVEL_OUTOF10: 6
PAINLEVEL_OUTOF10: 2

## 2024-11-13 ASSESSMENT — ENCOUNTER SYMPTOMS
NAUSEA: 0
VOMITING: 0
DIARRHEA: 0
COUGH: 0
SHORTNESS OF BREATH: 0

## 2024-11-13 ASSESSMENT — PAIN DESCRIPTION - ORIENTATION
ORIENTATION: LEFT;RIGHT
ORIENTATION: RIGHT;LEFT

## 2024-11-13 ASSESSMENT — PAIN DESCRIPTION - LOCATION
LOCATION: LEG
LOCATION: LEG

## 2024-11-13 NOTE — PROGRESS NOTES
11/06/24 0800   RT Protocol   History Pulmonary Disease 0   Respiratory pattern 0   Breath sounds 2   Cough 0   Indications for Bronchodilator Therapy Decreased or absent breath sounds   Bronchodilator Assessment Score 2       
   11/08/24 0200   RT Protocol   History Pulmonary Disease 0   Respiratory pattern 0   Breath sounds 2   Cough 0   Indications for Bronchodilator Therapy Decreased or absent breath sounds   Bronchodilator Assessment Score 2       
   11/08/24 1000   RT Protocol   History Pulmonary Disease 0   Respiratory pattern 0   Breath sounds 2   Cough 0   Indications for Bronchodilator Therapy Decreased or absent breath sounds   Bronchodilator Assessment Score 2       
   11/08/24 1500   RT Protocol   History Pulmonary Disease 0   Respiratory pattern 0   Breath sounds 2   Cough 0   Indications for Bronchodilator Therapy Decreased or absent breath sounds   Bronchodilator Assessment Score 2       
1000: Alert and oriented x4, calm and cooperative. No complaints of pain, nausea, or dizziness. O2 weaned to 2L at 96%-- none at baseline. Up with 1 assist and a walker. Pt is averaging 1 IV a day-- PerfectServe to Dr. Lazar regarding IV vs PO antibiotics as current IV is no longer usable. Awaiting. Bed alarm on. Call light within reach. Safety maintained.    1130: Pt up to chair-- tolerating well. Switched to PO antibiotics per ID.     1300: Dressing to BLE changed/applied by Aleta JOSE. Upon changing, black drainage was noted behind LLE. Dr. Lazar aware/shown dressing drainage on old dressing-- advised to follow up with wound clinic outpt and re-follow up with her outpt as needed. Pt remains on 2L with no complaints of SOB at 98%.    1825: Call to respiratory for breathing treatment-- shift change/without reply-- will call attempt again. See other PRN meds given per MAR via Aleta JOSE.     Electronically signed by Karine Braga RN on 11/11/2024 at 10:09 AM    
1000: Alert and oriented x4, calm and cooperative. Pt to be picked up for SNF at 1400. Pt does not want to take diuretics as she does not want to urinate upon transport. Dr. Salazar made aware on floor-- pt instructed to take both daily meds upon arrival to SNF. Repositioned. Dressings to BLE CDI. Medicated for pain per MAR. Safety maintained. Call light within reach.    1400: Medicated for pain per MAR. Transport at bedside to transfer pt. Call to Galina Devi with report called and all questions answered. Paperwork in yellow folwer provided to transport.     Electronically signed by Karine Braga RN on 11/13/2024 at 10:10 AM    
2109: Pt assessment completed. Aox4. Pt states some relief from pain medication administration just prior to shift change. Lung sounds diminished. Dressings to BLE applied on day shift, cdi.    2210:Pt placed on Bipap    0045: Pt removed bipap. Placed back on NC.  
Bipap removed at 0300 per patient request. Patient states it is not comfortable and she can't wear it.   
DVT / VTE PROPHYLAXIS EVALUATION    Recent Labs     11/05/24  1651 11/05/24 2050   BUN 13  --    CREATININE 0.56 0.6   *  --    HGB 13.9 15.6   HCT 48.3*  --      ADMITTING DX OR CHIEF COMPLAINT? Open wound  WARFARIN? DOAC'S? no  ANY APPARENT BLEEDING? no  SCHEDULED SURGERY? no     If yes to following, excluded from auto adjustment in Table 1 of policy - please contact provider with recommendations as appropriate.  Include condition/exception in scratch notes. Yes No   Trauma Service or Ortho Surgery []  [x]    Pregnancy []  [x]        Current order:  Enoxaparin 40 mg SUBQ once daily      Height: 162.6 cm (5' 4\"), Weight - Scale: (!) 282.2 kg (622 lb 1.6 oz)  Estimated Creatinine Clearance: 249 mL/min (based on SCr of 0.6 mg/dL).    Plan:  Pharmacologic VTE prophylaxis modified based on patient weight per [facility]/P&T approved protocol     Patient Weight (kg)      50.9 and below .9 101-150.9 151-174.9 175 or greater   Estimated   CrCl  (ml/min) 30 or greater []   30 mg   SUBQ daily   []   40 mg   SUBQ daily (or 30 mg BID for orthopedic cases) []  30 mg SUBQ   BID*  []  40 mg   SUBQ   BID [x]  60mg SUBQ BID    15-29.9 []  UFH 5000   units SUBQ BID []  30 mg   SUBQ daily [] 30 mg SUBQ   daily []  40 mg SUBQ   daily [] 60 mg SUBQ   Daily*    Less than 15 or dialysis []  UFH 5000   units SUBQ BID [] UFH 5000 units SUBQ TID []  UFH 7500   units   SUBQ TID*   *Do not exceed enoxaparin 40mg daily or UFH 5000 units SUBQ TID in patients with epidurals,   lumbar drains, or external ventricular drains    Johnny Lyn R.Ph.  11/6/2024  7:10 AM    
[facility]  Occupational Therapy        NAME:  Ana Laura Gongora  ROOM: W475/W475-01  :  1970  DATE: 2024    Attempted to see Ana Laura Gongora at 1109 on this date for:   []  Initial Evaluation   [x]  Treatment       Patient was unable to be seen due to:   [] Off unit for testing/procedure    [x] Patient declined   [] Therapy on hold due to     [] Nursing deferred due to    [] Other:      Pt sitting up in bedside chair upon arrival. Introduced self, explained role of OT, and offered to assist pt with morning ADLs. Pt declined reporting that she would like to wait until later. Pt reports that she is discharging to SNF at 2 PM today.     Will attempt again as able.     Electronically signed by MARIANNE Richardson on 24 at 11:13 AM EST  
INPATIENT PROGRESS NOTES    PATIENT NAME: Ana Laura Gongora  MRN: [id number]  SERVICE DATE:  November 12, 2024   SERVICE TIME:  5:04 PM      PRIMARY SERVICE: Pulmonary Disease    CHIEF COMPLAIN: Cough and shortness of breath      INTERVAL HPI: Patient seen and examined at bedside, Interval Notes, orders reviewed. Nursing notes noted  She is feeling better.  She has shortness of breath and O2 desaturation . Cough is less  She is on 2 L O2 via nasal cannula O2 saturation 94%.  She said she is not able to use BIPAP , she going to SNF from here.     OBJECTIVE   I/O:24HR INTAKE/OUTPUT:    Intake/Output Summary (Last 24 hours) at 11/12/2024 1704  Last data filed at 11/12/2024 1334  Gross per 24 hour   Intake 1040 ml   Output 3400 ml   Net -2360 ml     11/11 0701 - 11/12 0700  In: 2305.3 [P.O.:2200; I.V.:40]  Out: 4325 [Urine:4325]  Body mass index is 101.96 kg/m².    PHYSICAL EXAM:  Vitals:  /60   Pulse 92   Temp 98.2 °F (36.8 °C) (Oral)   Resp 18   Ht 1.626 m (5' 4\")   Wt (!) 269.4 kg (594 lb)   LMP  (LMP Unknown)   SpO2 94%   .96 kg/m²     General:, Morbidly obese alert, awake .comfortable in bed, No distress.  Head: Atraumatic , Normocephalic   Eyes: PERRL. No sclera icterus. No conjunctival injection. No discharge   ENT: No nasal  discharge. Pharynx clear.  Neck:  Trachea midline. No thyromegaly, no JVD, No cervical adenopathy.  Chest : Bilaterally symmetrical ,Normal effort,  No accessory muscle use  Lung : Diminished breath sound bilaterally No Rales. No wheezing. No rhonchi.   Heart:: Normal rate. Regular rhythm. No mumur ,  Rub or gallop  ABD: Non-tender. Non-distended. No masses. No organmegaly. Normal bowel sounds. No hernia.  Ext : 2+ leg edema and chronic changes with some redness left lower extremity, No Cyanosis No clubbing  Neuro: no focal weakness    Labs:  CBC:   Recent Labs     11/10/24  0639 11/11/24  0615 11/12/24  0557   WBC 5.4 5.2 5.3   HGB 13.0 13.5 13.8   HCT 45.1 44.9 47.2*    
INPATIENT PROGRESS NOTES    PATIENT NAME: Ana Laura Gongora  MRN: [id number]  SERVICE DATE:  November 11, 2024   SERVICE TIME:  2:50 PM      PRIMARY SERVICE: Pulmonary Disease    CHIEF COMPLAIN: Cough and shortness of breath      INTERVAL HPI: Patient seen and examined at bedside, Interval Notes, orders reviewed. Nursing notes noted  She is feeling better cough is less..  She also notes shortness of breath with coughing spell.    She is on 2 L O2 via nasal cannula O2 saturation 98%.  She said her O2 saturation goes down with activity.  Will need home O2 evaluation prior to discharge.    OBJECTIVE   I/O:24HR INTAKE/OUTPUT:    Intake/Output Summary (Last 24 hours) at 11/11/2024 1450  Last data filed at 11/11/2024 1308  Gross per 24 hour   Intake 1370 ml   Output 2425 ml   Net -1055 ml     11/10 0701 - 11/11 0700  In: 1048.2 [P.O.:950]  Out: 4950 [Urine:4950]  Body mass index is 101.96 kg/m².    PHYSICAL EXAM:  Vitals:  /84   Pulse 93   Temp 97.7 °F (36.5 °C) (Oral)   Resp 18   Ht 1.626 m (5' 4\")   Wt (!) 269.4 kg (594 lb)   LMP  (LMP Unknown)   SpO2 98%   .96 kg/m²     General:, Morbidly obese alert, awake .comfortable in bed, No distress.  Head: Atraumatic , Normocephalic   Eyes: PERRL. No sclera icterus. No conjunctival injection. No discharge   ENT: No nasal  discharge. Pharynx clear.  Neck:  Trachea midline. No thyromegaly, no JVD, No cervical adenopathy.  Chest : Bilaterally symmetrical ,Normal effort,  No accessory muscle use  Lung : Diminished breath sound bilaterally No Rales. No wheezing. No rhonchi.   Heart:: Normal rate. Regular rhythm. No mumur ,  Rub or gallop  ABD: Non-tender. Non-distended. No masses. No organmegaly. Normal bowel sounds. No hernia.  Ext : 2+ leg edema and chronic changes with some redness left lower extremity, No Cyanosis No clubbing  Neuro: no focal weakness    Labs:  CBC:   Recent Labs     11/09/24  0622 11/10/24  0639 11/11/24  0615   WBC 6.2 5.4 5.2   HGB 13.3 13.0 13.5 
INPATIENT PROGRESS NOTES    PATIENT NAME: Ana Laura Gongora  MRN: [id number]  SERVICE DATE:  November 13, 2024   SERVICE TIME:  12:46 PM      PRIMARY SERVICE: Pulmonary Disease    CHIEF COMPLAINTS: Hypoxia    INTERVAL HPI: Patient seen and examined at bedside, Interval Notes, orders reviewed. Nursing notes noted    Doing good, no complaint, chest pain, no shortness of breath not trying to use BiPAP every night, she is on 2 L O2 saturation 96%.    New information updated in the note today, rest of the examination did not change compared to yesterday.    Review of system:     GI Abdominal pain No  Skin Rash No    Social History     Tobacco Use    Smoking status: Never    Smokeless tobacco: Never   Substance Use Topics    Alcohol use: Yes     Comment: occasional     No family history on file.      OBJECTIVE    Body mass index is 101.96 kg/m².    PHYSICAL EXAM:  Vitals:  BP (!) 121/59   Pulse 95   Temp 97.5 °F (36.4 °C) (Oral)   Resp 18   Ht 1.626 m (5' 4\")   Wt (!) 269.4 kg (594 lb)   LMP  (LMP Unknown)   SpO2 96%   .96 kg/m²     General: alert, cooperative, no distress  Head: normocephalic, atraumatic  Eyes:No gross abnormalities.  ENT:  MMM no lesions  Neck:  supple and no masses  Chest : clear to auscultation bilaterally- no wheezes, rales or rhonchi, normal air movement, no respiratory distress  Heart:: Heart sounds are normal.  Regular rate and rhythm without murmur, gallop or rub.  ABD:  symmetric, soft, non-tender  Musculoskeletal : no cyanosis, no clubbing, and no edema  Neuro:  Grossly normal  Skin: No rashes or nodules noted.  Lymph node:  no cervical nodes  Urology: No Gómez   Psychiatric: appropriate    DATA:   Recent Labs     11/12/24  0557 11/13/24  0615   WBC 5.3 5.5   HGB 13.8 13.9   HCT 47.2* 46.6   MCV 84.3 83.8    144     Recent Labs     11/12/24  0557 11/13/24  0615    138   K 4.2 4.3   CL 94* 94*   CO2 38* 38*   BUN 9 9   CREATININE 0.52 0.49*   GLUCOSE 116* 119*   CALCIUM 9.2 
INPATIENT PROGRESS NOTES    PATIENT NAME: Ana Laura Gongora  MRN: [id number]  SERVICE DATE:  November 9, 2024   SERVICE TIME:  10:40 AM      PRIMARY SERVICE: Pulmonary Disease    CHIEF COMPLAINTS: Hypoxia    INTERVAL HPI: Patient seen and examined at bedside, Interval Notes, orders reviewed. Nursing notes noted    Patient report feeling better, shortness of breath improved, continues to require 3 L O2 saturation 97%, no fever, no worsening lower extremity edema    New information updated in the note today, rest of the examination did not change compared to yesterday.    Review of system:     GI Abdominal pain No  Skin Rash No    Social History     Tobacco Use    Smoking status: Never    Smokeless tobacco: Never   Substance Use Topics    Alcohol use: Yes     Comment: occasional     No family history on file.      OBJECTIVE    Body mass index is 106.78 kg/m².    PHYSICAL EXAM:  Vitals:  BP (!) 106/49   Pulse 95   Temp 97.7 °F (36.5 °C) (Oral)   Resp 18   Ht 1.626 m (5' 4\")   Wt (!) 282.2 kg (622 lb 1.6 oz)   LMP  (LMP Unknown)   SpO2 97%   .78 kg/m²     General: alert, cooperative, no distress  Head: normocephalic, atraumatic  Eyes:No gross abnormalities.  ENT:  MMM no lesions  Neck:  supple and no masses  Chest : clear to auscultation bilaterally- no wheezes, rales or rhonchi, normal air movement, no respiratory distress  Heart:: Heart sounds are normal.  Regular rate and rhythm without murmur, gallop or rub.  ABD:  symmetric, soft, non-tender  Musculoskeletal : no cyanosis, no clubbing, and no edema  Neuro:  Grossly normal  Skin: No rashes or nodules noted.  Lymph node:  no cervical nodes  Urology: No Gómez   Psychiatric: appropriate    DATA:   Recent Labs     11/08/24  0541 11/09/24  0622   WBC 6.5 6.2   HGB 12.7 13.3   HCT 43.3 44.8   MCV 84.2 84.5    143     Recent Labs     11/08/24  0541 11/09/24  0622    140   K 4.1 4.2   CL 94* 95   CO2 38* 39*   BUN 7 8   CREATININE 0.48* 0.50   GLUCOSE 
INPATIENT PROGRESS NOTES    PATIENT NAME: Ana Laura Gongora  MRN: [id number]  SERVICE DATE:  November 8, 2024   SERVICE TIME:  2:38 PM      PRIMARY SERVICE: Pulmonary Disease    CHIEF COMPLAIN: Cough and shortness of breath      INTERVAL HPI: Patient seen and examined at bedside, Interval Notes, orders reviewed. Nursing notes noted  He still coughing.  She also notes shortness of breath with coughing spell.  She has significant swelling in both lower extremity and redness more on left side.  She is on 2 L O2 via nasal cannula O2 saturation 93%.  As per RN her O2 saturation dropped  below 90%.  OBJECTIVE   I/O:24HR INTAKE/OUTPUT:    Intake/Output Summary (Last 24 hours) at 11/8/2024 1438  Last data filed at 11/8/2024 1219  Gross per 24 hour   Intake 310.35 ml   Output 7200 ml   Net -6889.65 ml     11/07 0701 - 11/08 0700  In: 310.4 [P.O.:240]  Out: 5450 [Urine:5450]  Body mass index is 106.78 kg/m².    PHYSICAL EXAM:  Vitals:  /66   Pulse 94   Temp 98.2 °F (36.8 °C) (Oral)   Resp 18   Ht 1.626 m (5' 4\")   Wt (!) 282.2 kg (622 lb 1.6 oz)   LMP  (LMP Unknown)   SpO2 94%   .78 kg/m²     General:, Morbidly obese alert, awake .comfortable in bed, No distress.  Head: Atraumatic , Normocephalic   Eyes: PERRL. No sclera icterus. No conjunctival injection. No discharge   ENT: No nasal  discharge. Pharynx clear.  Neck:  Trachea midline. No thyromegaly, no JVD, No cervical adenopathy.  Chest : Bilaterally symmetrical ,Normal effort,  No accessory muscle use  Lung : Diminished breath sound bilaterally No Rales. No wheezing. No rhonchi.   Heart:: Normal rate. Regular rhythm. No mumur ,  Rub or gallop  ABD: Non-tender. Non-distended. No masses. No organmegaly. Normal bowel sounds. No hernia.  Ext : 2+ leg edema and chronic changes with some redness left lower extremity, No Cyanosis No clubbing  Neuro: no focal weakness    Labs:  CBC:   Recent Labs     11/05/24  1651 11/05/24 2050 11/07/24  0604 11/08/24  0541   WBC 
INPATIENT PROGRESS NOTES    PATIENT NAME: Ana Laura Gongora  MRN: [id number]  SERVICE DATE:  November 10, 2024   SERVICE TIME:  11:03 AM      PRIMARY SERVICE: Pulmonary Disease    CHIEF COMPLAINTS: Hypoxia    INTERVAL HPI: Patient seen and examined at bedside, Interval Notes, orders reviewed. Nursing notes noted    Doing good, has no complaint, shortness of breath improved, she used BiPAP for almost 3 to 4 hours overnight, she felt the pressure is too high, she is on 3 L O2 saturation 99%    New information updated in the note today, rest of the examination did not change compared to yesterday.    Review of system:     GI Abdominal pain No  Skin Rash No    Social History     Tobacco Use    Smoking status: Never    Smokeless tobacco: Never   Substance Use Topics    Alcohol use: Yes     Comment: occasional     No family history on file.      OBJECTIVE    Body mass index is 106.78 kg/m².    PHYSICAL EXAM:  Vitals:  BP (!) 132/59   Pulse 93   Temp 97.3 °F (36.3 °C) (Oral)   Resp 18   Ht 1.626 m (5' 4\")   Wt (!) 282.2 kg (622 lb 1.6 oz)   LMP  (LMP Unknown)   SpO2 99%   .78 kg/m²     General: alert, cooperative, no distress  Head: normocephalic, atraumatic  Eyes:No gross abnormalities.  ENT:  MMM no lesions  Neck:  supple and no masses  Chest : clear to auscultation bilaterally- no wheezes, rales or rhonchi, normal air movement, no respiratory distress  Heart:: Heart sounds are normal.  Regular rate and rhythm without murmur, gallop or rub.  ABD:  symmetric, soft, non-tender  Musculoskeletal : no cyanosis, no clubbing, and no edema  Neuro:  Grossly normal  Skin: No rashes or nodules noted.  Lymph node:  no cervical nodes  Urology: No Gómez   Psychiatric: appropriate    DATA:   Recent Labs     11/09/24  0622 11/10/24  0639   WBC 6.2 5.4   HGB 13.3 13.0   HCT 44.8 45.1   MCV 84.5 84.6    147     Recent Labs     11/09/24  0622 11/10/24  0639    137   K 4.2 4.1   CL 95 93*   CO2 39* 39*   BUN 8 8 
Infectious Diseases Inpatient Progress Note          HISTORY OF PRESENT ILLNESS:  Follow up left leg cellulitis, left leg chronic nonpressure ulcers, chronic lymphedema and morbid obesity, admitted currently with shortness of breath on IV doxycycline, well tolerated.    Patient had dizziness and lightheadedness.  Lasix and Aldactone were put on hold.   Persistent cough.   Continues to desat with oxygen saturation down to 83% when on room air  Currently on 3 L nasal cannula.    CPAP ordered to be used at night.  Continues diuresing well    Persistent severe bilateral leg swelling, left leg drainage   Denies any leg pain.  Decreasing bilateral legs redness.  No fevers or chills.  No nausea vomiting  Occasional abdominal drainage    Current Medications:     spironolactone  50 mg Oral Daily    sodium chloride flush  5-40 mL IntraVENous 2 times per day    enoxaparin  60 mg SubCUTAneous BID    doxycycline (VIBRAMYCIN) IV  100 mg IntraVENous Q12H    ferrous sulfate  325 mg Oral Daily with breakfast    buPROPion  300 mg Oral QAM    levothyroxine  50 mcg Oral Daily    Vitamin D  2,000 Units Oral Daily    vitamin B-12  500 mcg Oral Daily    vitamin C  500 mg Oral Daily    furosemide  40 mg Oral Daily       Allergies:  Vancomycin, Amoxicillin, and Cephalexin      Review of Systems  Rest of system review is negative other than HPI    Physical Exam  Vitals:    11/08/24 1555 11/08/24 1616 11/08/24 1923 11/09/24 0730   BP:   (!) 107/50 (!) 106/49   Pulse: 93  (!) 104 95   Resp: 18 20 18 18   Temp:   98.1 °F (36.7 °C) 97.7 °F (36.5 °C)   TempSrc:   Oral Oral   SpO2: 94%  93% 97%   Weight:       Height:         General Appearance: alert and oriented to person, place and time, well-developed and well-nourished, in no acute distress  On 3 L nasal cannula  Skin: warm and dry, no rash.   Head: normocephalic and atraumatic  Eyes: anicteric sclerae  ENT:  normal mucous membranes.   Lungs: normal respiratory effort, diminished breath 
Infectious Diseases Inpatient Progress Note          HISTORY OF PRESENT ILLNESS:  Follow up left leg cellulitis, left leg chronic nonpressure ulcers, chronic lymphedema and morbid obesity, admitted currently with shortness of breath on IV doxycycline, well tolerated.    no dizziness and lightheadedness.  Lasix and Aldactone were put on hold.   decreased cough.   Currently on RA    Did not tolerate CPAP used at night.  Continues diuresing well on Lasix and Aldactone    Persistent severe bilateral leg swelling, left leg drainage   Denies any leg pain.  Decreasing bilateral legs redness.  No fevers or chills.  No nausea vomiting    Current Medications:     spironolactone  50 mg Oral Daily    sodium chloride flush  5-40 mL IntraVENous 2 times per day    enoxaparin  60 mg SubCUTAneous BID    doxycycline (VIBRAMYCIN) IV  100 mg IntraVENous Q12H    ferrous sulfate  325 mg Oral Daily with breakfast    buPROPion  300 mg Oral QAM    levothyroxine  50 mcg Oral Daily    Vitamin D  2,000 Units Oral Daily    vitamin B-12  500 mcg Oral Daily    vitamin C  500 mg Oral Daily    furosemide  40 mg Oral Daily       Allergies:  Vancomycin, Amoxicillin, and Cephalexin      Review of Systems  Rest of system review is negative other than HPI    Physical Exam  Vitals:    11/10/24 0059 11/10/24 0721 11/10/24 1255 11/10/24 1404   BP: (!) 130/52 (!) 132/59  (!) 144/64   Pulse: 90 93 96 96   Resp:  18  14   Temp:  97.3 °F (36.3 °C)     TempSrc:  Oral     SpO2: 93% 99% 93%    Weight:       Height:       Up in the chair  General Appearance: alert and oriented to person, place and time, well-developed and well-nourished, in no acute distress  On 3 L nasal cannula  Skin: warm and dry, no rash.   Head: normocephalic and atraumatic  Eyes: anicteric sclerae  ENT:  normal mucous membranes.   Lungs: normal respiratory effort, diminished breath sounds bilateral lung fields  Heart normal S1-S2 distant heart sounds  Abdomen: soft, no tenderness, large 
Infectious Diseases Inpatient Progress Note          HISTORY OF PRESENT ILLNESS:  Follow up left leg cellulitis, left leg chronic nonpressure ulcers, chronic lymphedema and morbid obesity, admitted currently with shortness of breath on IV doxycycline, well tolerated.    no dizziness and lightheadedness.  Lasix and Aldactone were put on hold.   decreased cough.  Persistent shortness of breath when off oxygen  Currently on 2 L nasal cannula  Continues diuresing well on Lasix and Aldactone    Decreasing bilateral leg swelling, left leg drainage   Denies any leg pain.  Decreasing bilateral legs redness.  No fevers or chills.  No nausea vomiting    Current Medications:     doxycycline  100 mg Oral 2 times per day    spironolactone  50 mg Oral Daily    sodium chloride flush  5-40 mL IntraVENous 2 times per day    enoxaparin  60 mg SubCUTAneous BID    ferrous sulfate  325 mg Oral Daily with breakfast    buPROPion  300 mg Oral QAM    levothyroxine  50 mcg Oral Daily    Vitamin D  2,000 Units Oral Daily    vitamin B-12  500 mcg Oral Daily    vitamin C  500 mg Oral Daily    furosemide  40 mg Oral Daily       Allergies:  Vancomycin, Amoxicillin, and Cephalexin      Review of Systems  Rest of system review is negative other than HPI    Physical Exam  Vitals:    11/10/24 2027 11/11/24 0258 11/11/24 0747 11/11/24 1226   BP: (!) 133/53 (!) 126/58 (!) 138/55    Pulse: 96 96 91    Resp:  18 18 17   Temp:  97.5 °F (36.4 °C) 97.5 °F (36.4 °C)    TempSrc:  Oral Oral    SpO2: 94% 91% 96%    Weight:       Height:       Up in the chair  General Appearance: alert and oriented to person, place and time, well-developed and well-nourished, in no acute distress  On nasal cannula  Skin: warm and dry, no rash.   Head: normocephalic and atraumatic  Eyes: anicteric sclerae  ENT:  normal mucous membranes.   Lungs: normal respiratory effort  Abdomen: large pannus with abdominal wall edema  Positive decreasing bilateral legs edema with intact 
Infectious Diseases Inpatient Progress Note          HISTORY OF PRESENT ILLNESS:  Follow up left leg cellulitis, left leg chronic nonpressure ulcers, chronic lymphedema and morbid obesity, admitted currently with shortness of breath on IV doxycycline, well tolerated.  Had a bout of severe shortness of breath last night that caused her to be panicked.  Currently on 3 L nasal cannula.  Positive dry cough.  Little tickle in the back of the throat   reports diuresing well with Lasix.  Reports decreased weight of 11 pounds since admission.   Was seen by Dr. Dejesus for severe morbid obesity.   Patient reports seeing counselor prior to admission  Persistent severe bilateral leg swelling, left leg drainage and soreness.  Bilateral legs redness.  No fevers or chills.  No nausea vomiting  Occasional abdominal drainage  Denies any pain  Persistent tachycardia and hypoxia noted  Current Medications:     spironolactone  50 mg Oral Daily    sodium chloride flush  5-40 mL IntraVENous 2 times per day    enoxaparin  60 mg SubCUTAneous BID    doxycycline (VIBRAMYCIN) IV  100 mg IntraVENous Q12H    ferrous sulfate  325 mg Oral Daily with breakfast    buPROPion  300 mg Oral QAM    levothyroxine  50 mcg Oral Daily    Vitamin D  2,000 Units Oral Daily    vitamin B-12  500 mcg Oral Daily    vitamin C  500 mg Oral Daily    furosemide  40 mg Oral Daily       Allergies:  Vancomycin, Amoxicillin, and Cephalexin      Review of Systems  Rest of system review is negative other than HPI    Physical Exam  Vitals:    11/08/24 1555 11/08/24 1616 11/08/24 1923 11/09/24 0730   BP:   (!) 107/50 (!) 106/49   Pulse: 93  (!) 104 95   Resp: 18 20 18 18   Temp:   98.1 °F (36.7 °C) 97.7 °F (36.5 °C)   TempSrc:   Oral Oral   SpO2: 94%  93% 97%   Weight:       Height:         General Appearance: alert and oriented to person, place and time, well-developed and well-nourished, in no acute distress  On 3 L nasal cannula  Skin: warm and dry, no rash.   Head: 
Infectious Diseases Inpatient Progress Note          HISTORY OF PRESENT ILLNESS:  Follow up left leg cellulitis, left leg chronic nonpressure ulcers, chronic lymphedema and morbid obesity, admitted currently with shortness of breath on IV doxycycline, well tolerated.  Had a bout of severe shortness of breath last night that caused her to be panicked.  Currently on 3 L nasal cannula.  Positive dry cough.  Little tickle in the back of the throat   reports diuresing well with Lasix.  Reports decreased weight of 11 pounds since admission.   Was seen by Dr. Dejesus for severe morbid obesity.   Patient reports seeing counselor prior to admission  Persistent severe bilateral leg swelling, left leg drainage and soreness.  Bilateral legs redness.  No fevers or chills.  No nausea vomiting  Occasional abdominal drainage  Denies any pain  Persistent tachycardia and hypoxia noted last night  Current Medications:     sodium chloride flush  5-40 mL IntraVENous 2 times per day    enoxaparin  60 mg SubCUTAneous BID    doxycycline (VIBRAMYCIN) IV  100 mg IntraVENous Q12H    ferrous sulfate  325 mg Oral Daily with breakfast    buPROPion  300 mg Oral QAM    levothyroxine  50 mcg Oral Daily    Vitamin D  2,000 Units Oral Daily    vitamin B-12  500 mcg Oral Daily    vitamin C  500 mg Oral Daily    furosemide  40 mg Oral Daily       Allergies:  Vancomycin, Amoxicillin, and Cephalexin      Review of Systems  Rest of system review is negative other than HPI    Physical Exam  Vitals:    11/06/24 2149 11/07/24 0102 11/07/24 0712 11/07/24 0935   BP:  122/63 132/62    Pulse:  (!) 105 95    Resp: 20 16 18 18   Temp:  97.7 °F (36.5 °C) 98.2 °F (36.8 °C)    TempSrc:  Oral Oral    SpO2:  (!) 88% 95%    Weight:       Height:         General Appearance: alert and oriented to person, place and time, well-developed and well-nourished, in no acute distress  Skin: warm and dry, no rash.   Head: normocephalic and atraumatic  Eyes: anicteric sclerae  ENT: 
Patient assessment and vitals complete and per flowsheets. Patient dressings to ble c/d/I. Patient iv infusing, c/o pain at IV site, states IV is tolerable and okay to deal with infusion. No other needs, Tylenol and Xanax given per emar, voices relief from headache and anxiety from such.   
Physical Therapy  Facility/Department: [facility] MED SURG W475/W475-01  Physical Therapy Discharge      NAME: Ana Laura Gongora    : 1970 (54 y.o.)  MRN: [id number]    Account: [id number]  Gender: female      Patient has been discharged from acute care hospital. DC patient from current PT program.      Electronically signed by Ivett Bro PT on 24 at 4:57 PM EST    
Physical Therapy Med Surg Daily Treatment Note  Facility/Department: [address] MED SURG UNIT  Room: [place] [id number]       NAME: Ana Laura Gongora  : 1970 (54 y.o.)  MRN: [id number]  CODE STATUS: Full Code    Date of Service: 2024    Patient Diagnosis(es): Open leg wound, left, initial encounter [S81.802A]  Wound infection [T14.8XXA, L08.9]   No chief complaint on file.    Patient Active Problem List    Diagnosis Date Noted    Acute diastolic heart failure (HCC) 2024    Acute hypoxic respiratory failure 2024    Open leg wound, left, initial encounter 2024    Wound infection 2024    Shortness of breath 2024    Sepsis due to urinary tract infection (HCC) 2019    Left leg cellulitis 2018    Venous insufficiency of both lower extremities 2018    Chronic acquired lymphedema 2018    Obesity, morbid (more than 100 lbs over ideal weight or BMI > 40) 2018    Venous stasis ulcer of right calf with fat layer exposed with varicose veins (HCC) 2018    Venous stasis ulcer of left calf (HCC) 2018        Past Medical History:   Diagnosis Date    Morbid obesity     Overactive bladder     Psychiatric problem      Past Surgical History:   Procedure Laterality Date    ABDOMEN SURGERY      gastric bypass surgery    BACK SURGERY      CHOLECYSTECTOMY         Chart Reviewed: Yes  Patient assessed for rehabilitation services?: Yes  Family / Caregiver Present: No    Restrictions:  Restrictions/Precautions: Fall Risk    SUBJECTIVE:   Subjective: Patient sitting EOB upon arrival. States she just returned to bed from chair. Agreeable to tx. \"I'll try\"  Response To Previous Treatment: Patient with no complaints from previous session.    Pain   7/10 left knee pre/post session     OBJECTIVE:     Bed mobility  Bed Mobility Comments: declined: awaiting LE dressing change patient request to stay seated- RN in room    Transfers  Sit to Stand: Stand by assistance  Stand to Sit: 
Physical Therapy Med Surg Daily Treatment Note  Facility/Department: [address] MED SURG UNIT  Room: [place] [address]       NAME: Michael Gongora  : 1970 (54 y.o.)  MRN: [id number]  CODE STATUS: Full Code    Date of Service: 2024    Patient Diagnosis(es): Open leg wound, left, initial encounter [S81.802A]  Wound infection [T14.8XXA, L08.9]   No chief complaint on file.    Patient Active Problem List    Diagnosis Date Noted    Acute diastolic heart failure (HCC) 2024    Acute hypoxic respiratory failure 2024    Open leg wound, left, initial encounter 2024    Wound infection 2024    Shortness of breath 2024    Sepsis due to urinary tract infection (HCC) 2019    Left leg cellulitis 2018    Venous insufficiency of both lower extremities 2018    Chronic acquired lymphedema 2018    Obesity, morbid (more than 100 lbs over ideal weight or BMI > 40) 2018    Venous stasis ulcer of right calf with fat layer exposed with varicose veins (HCC) 2018    Venous stasis ulcer of left calf (HCC) 2018        Past Medical History:   Diagnosis Date    Morbid obesity     Overactive bladder     Psychiatric problem      Past Surgical History:   Procedure Laterality Date    ABDOMEN SURGERY      gastric bypass surgery    BACK SURGERY      CHOLECYSTECTOMY         Chart Reviewed: Yes  Family / Caregiver Present: No    Restrictions:  Restrictions/Precautions: Fall Risk    SUBJECTIVE:   Subjective: I knew you guys were coming.    Pain  Pain: Reports leg pain but does not rate     OBJECTIVE:         Transfers  Sit to Stand: Moderate Assistance;2 Person Assistance  Stand to Sit: Moderate Assistance;2 Person Assistance  Comment: Mod cuing for sequencing form with good carryover.       PT Exercises  Dynamic Sitting Balance Exercises: LAQ x 5-10, unsupported sitting 3 mins , trunk twist 5 each side          Vitals  SpO2: 96 %          ASSESSMENT   Assessment: Michael presents with 
Progress Note  Date:2024       Room:[place] [id number]  Patient Name:Ana Laura Gongora     YOB: 1970     Age:54 y.o.        Subjective    Subjective:  Symptoms:  She reports malaise and weakness.  No shortness of breath, cough, chest pain, headache, chest pressure, anorexia, diarrhea or anxiety.    Diet:  No nausea or vomiting.       Review of Systems   Respiratory:  Negative for cough and shortness of breath.    Cardiovascular:  Negative for chest pain.   Gastrointestinal:  Negative for anorexia, diarrhea, nausea and vomiting.   Neurological:  Positive for weakness.     Objective         Vitals Last 24 Hours:  TEMPERATURE:  Temp  Av.8 °F (36.6 °C)  Min: 97.5 °F (36.4 °C)  Max: 98.1 °F (36.7 °C)  RESPIRATIONS RANGE: Resp  Av.4  Min: 18  Max: 22  PULSE OXIMETRY RANGE: SpO2  Av.7 %  Min: 91 %  Max: 96 %  PULSE RANGE: Pulse  Av.3  Min: 92  Max: 102  BLOOD PRESSURE RANGE: Systolic (24hrs), Av , Min:113 , Max:135   ; Diastolic (24hrs), Av, Min:59, Max:60    I/O (24Hr):    Intake/Output Summary (Last 24 hours) at 2024 1022  Last data filed at 2024 1004  Gross per 24 hour   Intake 860 ml   Output 3200 ml   Net -2340 ml     Objective:  General Appearance:  Comfortable, well-appearing and in no acute distress.    Vital signs: (most recent): Blood pressure (!) 121/59, pulse 95, temperature 97.5 °F (36.4 °C), temperature source Oral, resp. rate 18, height 1.626 m (5' 4\"), weight (!) 269.4 kg (594 lb), SpO2 96%.    HEENT: Normal HEENT exam.    Lungs:  Breath sounds clear to auscultation.    Heart: Normal rate.    Abdomen: Abdomen is soft.  Bowel sounds are normal.   There is no epigastric area tenderness.     Pulses: Distal pulses are intact.    Neurological: Patient is alert.    Pupils:  Pupils are equal, round, and reactive to light.    Skin:  Warm and dry.      Labs/Imaging/Diagnostics    Labs:  CBC:  Recent Labs     24  0615 24  0557 24  0615   WBC 5.2 5.3 
Pt assessment and VS completed. Pt calm and cooperative. Med given per MAR, PRN Xanax and Tramadol given. Pt with no c/o chest pain or SOB. Pt to DC to SN in [place] at 2:00 PM. Bed locked and in low position. Call light within reach prior to end of session.     Electronically signed by Lissy Egan RN on 11/13/2024 at 12:22 AM   
Pt originally on 2L supplemental 02 on shift change. Pt increasingly SOB, respiratory treatment given and 02 increased to 3 L. Pt states she feels like her output is less then through out the day. (See flowsheet). Increase ronchi in R lung lobes. Increase 02 to 4.5L to maintain 02 above 90% notified NP of change in condition. Awaiting orders.     0244 duoneb ordered respiratory team called   
Shift assessment complete. VSS. A&Ox4. Patient is calm and cooperative. Denies having nausea or pain (recently medicated with PRN ultram). Dyspnea present on exertion. On 3L NC. While ambulating up to chair, patient removed NC. O2 sat dropped to 83%. This RN called into room. O2 replaced, patient back to 94%. Lung sounds are diminished. SR: No complaints of chest discomfort. Abdomen rotund. Bowel sounds hypoactive. One large Bowel movement today. Up x 1 to commode. Tolerating diet. On fluid restriction. 4+ pitting edema (Lymphedema) BLE. New dressings placed to Bilateral leg wounds per order. Was able to sit in chair for several hours. Patient assisted back to bed. Bed in lowest position. Call light in reach. No needs at this time. Care ongoing.     Electronically signed by Tani Mccoy RN on 11/9/2024 at 2:14 PM   
Shift assessment complete. VSS. A&Ox4. Patient is calm and cooperative. Denies having nausea. Dyspnea present on exertion. On 3L NC. Lung sounds are diminished. SR: no complaints of chest pain. Complaining of 6/10 back, BLE pain. Declined to take PRN ultram at this time. Dry cough noted: PRN tessalon lauren given. Abdomen is rotund. Bowel sounds are hypoactive. No bowel movement this morning. Tolerating diet. (Fluid restriction). Patient changed, repositioned, boosted up in bed. Redness to abdominal folds and buttocks. Discoloration to BLE. New dressings applied to wounds LLE/RLE. External catheter in place.  Bed in lowest position. Call light within reach. No needs at this time. Care ongoing.     1128 - Lasix and aldactone given with morning medications. Patient has voided 2200cc since 7 am  - States she will get up to chair for lunch.     Electronically signed by Tani Mccoy RN on 11/10/2024 at 11:32 AM     1530 - Patient up to commode  - Returned back to chair without calling this RN. Nasal cannula had been removed by patient. O2 79%. Oxygen replaced on patient at 3L NC. Patient back to 94%.  
Spiritual Health History and Assessment/Progress Note  [facility]    Initial Encounter,  ,  ,      Name: Ana Laura Gongora MRN: [id number]    Age: 54 y.o.     Sex: female   Language: English   Yarsani: None   Open leg wound, left, initial encounter     Date: 11/11/2024            Total Time Calculated: 35 min              [place] [facility] [place]  [facility] [place]        Referral/Consult From: Rounding   Encounter Overview/Reason: Initial Encounter  Service Provided For: Patient  Met with patient. Patient expresses concerns whether if she goes to aftercare,whether her needs / requirements will be met. Expresses Grief related to her 's death three years ago.  [place], Belief, Meaning:   Patient has beliefs or practices that help with coping during difficult times  Family/Friends No family/friends present      Importance and Influence:  Patient has no beliefs influential to healthcare decision-making identified during this visit  Family/Friends No family/friends present    Community:  Patient Other: chooses to be alone; lives near sister.  Family/Friends No family/friends present    Assessment and Plan of Care:     Patient Interventions include: Facilitated expression of thoughts and feelings and Affirmed coping skills/support systems  Family/Friends Interventions include: No family/friends present    Patient Plan of Care: Spiritual Care available upon further referral  Family/Friends Plan of Care: No family/friends present    Electronically signed by Cherelle Wills  Intern on 11/11/2024 at 3:28 PM   
5.4 5.2   RBC 5.30 5.33 5.29   HGB 13.3 13.0 13.5   HCT 44.8 45.1 44.9   MCV 84.5 84.6 84.9   RDW 15.1* 15.3* 15.1*    147 149     CHEMISTRIES:  Recent Labs     11/09/24  0622 11/10/24  0639 11/11/24  0615    137 137   K 4.2 4.1 4.2   CL 95 93* 93*   CO2 39* 39* 39*   BUN 8 8 9   CREATININE 0.50 0.48* 0.47*   GLUCOSE 118* 113* 113*     PT/INR:No results for input(s): \"PROTIME\", \"INR\" in the last 72 hours.  APTT:No results for input(s): \"APTT\" in the last 72 hours.  LIVER PROFILE:  Recent Labs     11/09/24  0622 11/10/24  0639 11/11/24  0615   AST 14 15 16   ALT 7 8 8   BILITOT 1.0* 0.8* 0.7   ALKPHOS 47 48 48       Imaging Last 24 Hours:  No results found.  Assessment//Plan           Hospital Problems             Last Modified POA    * (Principal) Open leg wound, left, initial encounter 11/6/2024 Yes    Obesity, morbid (more than 100 lbs over ideal weight or BMI > 40) 11/8/2024 Yes    Chronic acquired lymphedema 11/8/2024 Yes    Left leg cellulitis 11/8/2024 Yes    Wound infection 11/6/2024 Yes    Shortness of breath 11/6/2024 Yes    Acute diastolic heart failure (HCC) 11/8/2024 Yes    Acute hypoxic respiratory failure 11/8/2024 Yes     Assessment & Plan  11/11: Continue with current care.  Talk to the patient that she will need SNF postdischarge patient agreement.  Patient medically stable to discharge at this time.  Awaiting for placement.  Spoke with case management.  Continue current care.  Final antibiotics as per ID.  EF is noted. TCT 55 min  Electronically signed by Lisa Salazar MD on 11/11/24 at 12:29 PM EST    
Yazid R, DO        Or    acetaminophen (TYLENOL) suppository 650 mg  650 mg Rectal Q6H PRN Milton, Jackie R, DO        doxycycline (VIBRAMYCIN) 100 mg in sodium chloride 0.9 % 100 mL IVPB  100 mg IntraVENous Q12H Milton, Yazid R,  mL/hr at 11/07/24 0916 100 mg at 11/07/24 0916    ferrous sulfate (IRON 325) tablet 325 mg  325 mg Oral Daily with breakfast MiltonJackie rahman R, DO   325 mg at 11/07/24 0855    benzonatate (TESSALON) capsule 100 mg  100 mg Oral TID PRN Milton, Patriciahiro R, DO   100 mg at 11/07/24 0905    buPROPion (WELLBUTRIN XL) extended release tablet 300 mg  300 mg Oral QAM Milton, Jackie R, DO   300 mg at 11/07/24 0855    levothyroxine (SYNTHROID) tablet 50 mcg  50 mcg Oral Daily Milton, Jackie R, DO   50 mcg at 11/07/24 0439    Vitamin D (CHOLECALCIFEROL) tablet 2,000 Units  2,000 Units Oral Daily Milton, Patriciazid R, DO   2,000 Units at 11/07/24 0905    vitamin B-12 (CYANOCOBALAMIN) tablet 500 mcg  500 mcg Oral Daily Milton, Patriciahiro R, DO   500 mcg at 11/07/24 0855    ascorbic acid (VITAMIN C) tablet 500 mg  500 mg Oral Daily Milton, Patriciahiro R, DO   500 mg at 11/07/24 0855    albuterol sulfate HFA (PROVENTIL;VENTOLIN;PROAIR) 108 (90 Base) MCG/ACT inhaler 2 puff  2 puff Inhalation Q6H PRN Milton, Jackie R, DO        traMADol (ULTRAM) tablet 50 mg  50 mg Oral Q6H PRN Milton, Jackie R, DO   50 mg at 11/07/24 0905    furosemide (LASIX) tablet 40 mg  40 mg Oral Daily Azucena Lazar MD   40 mg at 11/07/24 0855       New information updated in the note today, rest of the examination did not change compared to yesterday.    Additional work up or/and treatment plan may be added today or then after based on clinical progression. I am managing a portion of pt care. Some medical issues are handled by other specialists. Additional work up and treatment should be done in out pt setting by pt PCP and other out pt providers.      In addition to examining and evaluating pt, I spent additional time explaining care, 
mg IntraVENous PRN Milton, Yahiro R, DO        enoxaparin (LOVENOX) injection 60 mg  60 mg SubCUTAneous BID Milton, Yahiro R, DO   60 mg at 11/10/24 0757    ondansetron (ZOFRAN-ODT) disintegrating tablet 4 mg  4 mg Oral Q8H PRN Milton, Yazid R, DO        Or    ondansetron (ZOFRAN) injection 4 mg  4 mg IntraVENous Q6H PRN Milton, Yazid R, DO        polyethylene glycol (GLYCOLAX) packet 17 g  17 g Oral Daily PRN Milton, Yazid R, DO        acetaminophen (TYLENOL) tablet 650 mg  650 mg Oral Q6H PRN Milton, Yazid R, DO   650 mg at 11/08/24 2059    Or    acetaminophen (TYLENOL) suppository 650 mg  650 mg Rectal Q6H PRN Milton, Yazid R, DO        doxycycline (VIBRAMYCIN) 100 mg in sodium chloride 0.9 % 100 mL IVPB  100 mg IntraVENous Q12H Milton, Jackie R,  mL/hr at 11/10/24 0804 100 mg at 11/10/24 0804    ferrous sulfate (IRON 325) tablet 325 mg  325 mg Oral Daily with breakfast Milton, Yazid R, DO   325 mg at 11/10/24 0756    benzonatate (TESSALON) capsule 100 mg  100 mg Oral TID PRN Milton, Yazid R, DO   100 mg at 11/10/24 0757    buPROPion (WELLBUTRIN XL) extended release tablet 300 mg  300 mg Oral QAM Milton, Jackie R, DO   300 mg at 11/10/24 0756    levothyroxine (SYNTHROID) tablet 50 mcg  50 mcg Oral Daily Milton, Yazid R, DO   50 mcg at 11/10/24 0741    Vitamin D (CHOLECALCIFEROL) tablet 2,000 Units  2,000 Units Oral Daily Milton, Yazid R, DO   2,000 Units at 11/10/24 0756    vitamin B-12 (CYANOCOBALAMIN) tablet 500 mcg  500 mcg Oral Daily Milton, Yazid R, DO   500 mcg at 11/10/24 0756    ascorbic acid (VITAMIN C) tablet 500 mg  500 mg Oral Daily Milton, Yazid R, DO   500 mg at 11/10/24 0756    albuterol sulfate HFA (PROVENTIL;VENTOLIN;PROAIR) 108 (90 Base) MCG/ACT inhaler 2 puff  2 puff Inhalation Q6H PRN Jackie Rose, DO   2 puff at 11/07/24 2016    traMADol (ULTRAM) tablet 50 mg  50 mg Oral Q6H PRN Jackie Rose, DO   50 mg at 11/09/24 2101    furosemide (LASIX) tablet 40 mg  40 mg Oral 
ondansetron (ZOFRAN) injection 4 mg  4 mg IntraVENous Q6H PRN Milton, Jackie R, DO        polyethylene glycol (GLYCOLAX) packet 17 g  17 g Oral Daily PRN Milton, Jackie R, DO        acetaminophen (TYLENOL) tablet 650 mg  650 mg Oral Q6H PRN Milton, Jackie R, DO        Or    acetaminophen (TYLENOL) suppository 650 mg  650 mg Rectal Q6H PRN Milton, Jackie R, DO        doxycycline (VIBRAMYCIN) 100 mg in sodium chloride 0.9 % 100 mL IVPB  100 mg IntraVENous Q12H Milton, Jackie R, DO   Stopped at 11/08/24 1006    ferrous sulfate (IRON 325) tablet 325 mg  325 mg Oral Daily with breakfast Jackie Rose R, DO   325 mg at 11/08/24 0859    benzonatate (TESSALON) capsule 100 mg  100 mg Oral TID PRN Jackie Rose R, DO   100 mg at 11/07/24 1948    buPROPion (WELLBUTRIN XL) extended release tablet 300 mg  300 mg Oral QAM Jackie Rose R, DO   300 mg at 11/08/24 0859    levothyroxine (SYNTHROID) tablet 50 mcg  50 mcg Oral Daily Jackie Rose R, DO   50 mcg at 11/08/24 0621    Vitamin D (CHOLECALCIFEROL) tablet 2,000 Units  2,000 Units Oral Daily Milton, Jackie R, DO   2,000 Units at 11/08/24 1010    vitamin B-12 (CYANOCOBALAMIN) tablet 500 mcg  500 mcg Oral Daily Jackie Rose R, DO   500 mcg at 11/08/24 0858    ascorbic acid (VITAMIN C) tablet 500 mg  500 mg Oral Daily Milton, Jackie R, DO   500 mg at 11/08/24 0859    albuterol sulfate HFA (PROVENTIL;VENTOLIN;PROAIR) 108 (90 Base) MCG/ACT inhaler 2 puff  2 puff Inhalation Q6H PRN Jackie Rose R, DO   2 puff at 11/07/24 2016    traMADol (ULTRAM) tablet 50 mg  50 mg Oral Q6H PRN Jackie Rose R, DO   50 mg at 11/08/24 0337    furosemide (LASIX) tablet 40 mg  40 mg Oral Daily Azucena Lazar MD   40 mg at 11/08/24 0859       New information updated in the note today, rest of the examination did not change compared to yesterday.    Additional work up or/and treatment plan may be added today or then after based on clinical progression. I am managing a portion of pt care. Some 
progress    Assessment: Patient is a 54 year old female who is currently hospitalized secondary to open leg wound who is currently functioning below reported functional mobility baseline of independent, limited significantly by decreased endurance.  Patient demonstrates decreased ability to perform functional mobility tasks & ADLs, decreased functional endurance & strength & balance.  Patient would benefit from acute PT services in order to address these impairments as well as improve patient's QOL.  Requires PT Follow-Up: Yes      PLAN OF CARE:  Physical Therapy Plan  General Plan: 5-7 times per week (1x/day)  Current Treatment Recommendations: Strengthening, Balance training, Functional mobility training, Home exercise program, Neuromuscular re-education, Transfer training, ADL/Self-care training, IADL training, Safety education & training, Patient/Caregiver education & training, Therapeutic activities, Pain management, Endurance training, Modalities, Positioning, Equipment evaluation, education, & procurement, Manual  Safety Devices  Type of Devices: All carmen prominences offloaded, All fall risk precautions in place, Call light within reach, Left in bed, Bed alarm in place    Goals:  Long Term Goals  Long Term Goal 1: Patient will be able to perform STS transfers independently with LRD in order to get up/down from surfaces.  Long Term Goal 2: Patient will be able to perform SPT independently with LRD in order to get to/from various surfaces.  Long Term Goal 3: Patient will tolerate standing with LRD independently for at least 1 minute in order to demonstrate improved endurance.    [facility] (6 CLICK) BASIC MOBILITY  AM-PAC Inpatient Mobility Raw Score : 12    Therapy Time:   Individual   Time In 1415   Time Out 1500   Minutes 45   Timed Code Treatment Minutes: 15 Minutes       Magdalena Higginbotham PT, 11/07/24 at 3:30 PM         Definitions for assistance levels  Independent = pt does not require any physical 
BLE    OBJECTIVE:  /66   Pulse 91   Temp 98.2 °F (36.8 °C) (Oral)   Resp 18   Ht 1.626 m (5' 4\")   Wt (!) 269.4 kg (594 lb)   LMP  (LMP Unknown)   SpO2 93%   .96 kg/m²   Patient is alert and oriented to person, place, and time     Vascular:   Nonpalpable pulses b/l but biphasic on doppler  Capillary Fill time < 3 seconds to B/L digits  Skin temperature warm to warm tibial tuberosity to the digits B/L  Hair growth absent to digits  severe edema       Neurological:   Sensation to light touch intact B/L      Musculoskeletal/Orthopaedic:   Deferred due to pain and ulcerations    Dermatological:     Ulceration #1:   Location: Posterolateral L calf  Measurements: 3 cm x 2 cm x 0.1 cm  Base: Mixed Granular/Fibrotic  Borders:  macerated tissue.  Exudate: moderate drainage   Comments: ++ periwound erythema and edema extending 1cm beyond wound borders with no evidence of ascending lymphangitis.Wound does not undermine or probe to bone.    Ulceration #2:   Location: Medial L calf, distal  Measurements: 1.2 cm x 1.5 cm x 0.1 cm  Base: Mixed Granular/Fibrotic  Borders:  macerated tissue.  Exudate: moderate drainage   Comments: ++ periwound erythema and edema extending 1cm beyond wound borders with no evidence of ascending lymphangitis.Wound does not undermine or probe to bone.    Ulceration #3:   Location: Medial L calf, proximal  Measurements: 2 cm x 2 cm x 0.1 cm  Base: Mixed Granular/Fibrotic  Borders:  macerated tissue.  Exudate: moderate drainage   Comments: ++ periwound erythema and edema extending 1cm beyond wound borders with no evidence of ascending lymphangitis.Wound does not undermine or probe to bone.    Ulceration #4:   Location: anterolateral right calf  Measurements: 2 cm x 1.5 cm x 0.1 cm  Base: Mixed Granular/Fibrotic  Borders:  macerated tissue.  Exudate: moderate drainage   Comments: ++ periwound erythema and edema extending 1cm beyond wound borders with no evidence of ascending 
None. HISTORY: ORDERING SYSTEM PROVIDED HISTORY: sob TECHNOLOGIST PROVIDED HISTORY: Reason for exam:->sob What reading provider will be dictating this exam?->CRC FINDINGS: The heart is enlarged.  Pulmonary vessels are congested.  No active airspace consolidation.  No pneumothorax or large pleural effusion.     Cardiomegaly with pulmonary vascular congestion.     XR TIBIA FIBULA LEFT (2 VIEWS)    Result Date: 10/11/2024  EXAMINATION: 7 XRAY VIEWS OF THE LEFT TIBIA AND FIBULA 10/10/2024 10:44 pm COMPARISON: Left tibia/fibula from September 18, 2018 HISTORY: ORDERING SYSTEM PROVIDED HISTORY: wounds TECHNOLOGIST PROVIDED HISTORY: Reason for exam:->wounds What reading provider will be dictating this exam?->CRC FINDINGS: Left knee joint is properly aligned.  Moderate left knee osteoarthritis.  No cortical irregularity seen along the course of the left tibia nor fibula.  No abnormal periosteal elevation.  Left ankle joint is properly aligned. Osseous mineralization is preserved.  Diffuse soft tissue prominence and soft tissue stranding.  There are superficial skin irregularities seen around the left tibia and fibula.  Mild tibiotalar joint degenerative spurring. Calcaneal enthesophytes.     1.  Superficial soft tissue stranding and skin irregularity seen around the left tibia and fibula. 2.  No acute osseous findings.  Normal alignment.  Left knee and ankle osteoarthritis. RECOMMENDATION: In the setting of recent trauma, if there is persistent symptoms and physical exam warrants a repeat radiograph in 10-14 days could be considered as occult fractures may not be evident on initial imaging evaluation.     IMPRESSION AND SUGGESTION:  Bilateral lower extremity wound infection-worse on the left side-maggot infestation   Fluid overload  Cough likely due to bronchitis  Dyspnea with exertion, multifactorial  Morbid obesity WARD  Depression    Patient is having short of breath with exertion which is worsening last 2 weeks likely 
care of personal grooming?: None  How much help for eating meals?: None  AM-[facility] Inpatient Daily Activity Raw Score: 16  AM-[facility] Inpatient ADL T-Scale Score : 35.96  ADL Inpatient CMS 0-100% Score: 53.32    Therapy key for assistance levels -   Independent/Mod I = Pt. is able to perform task with no assistance but may require a device   Stand by assistance = Pt. does not perform task at an independent level but does not need physical assistance, requires verbal cues  Minimal, Moderate, Maximal Assistance = Pt. requires physical assistance (25%, 50%, 75% assist from helper) for task but is able to actively participate in task   Dependent = Pt. requires total assistance with task and is not able to actively participate with task completion     Plan:  Occupational Therapy Plan  Times Per Week: 1-4x/wk  Current Treatment Recommendations: Balance training, Functional mobility training, Self-Care / ADL, Endurance training, Neuromuscular re-education    Goals:   Patient will:    - Improve functional endurance to tolerate/complete 12-25 mins of ADL's  - Be Set up in UB ADLs   - Be Mod A in LB ADLs  - Be Supervision in ADL transfers without LOB    Patient Goal: Patient goals : To return to home when ready      Discussed and agreed upon: Yes Comments:       Therapy Time:   Individual   Time In 1415   Time Out 1500   Minutes 45          Eval: 45 minutes     Electronically signed by:    MACI Ward,   11/7/2024, 3:42 PM Electronically signed by MACI Ward on 11/7/24 at 3:45 PM EST     
plan may be added today or then after based on clinical progression. I am managing a portion of pt care. Some medical issues are handled by other specialists. Additional work up and treatment should be done in out pt setting by pt PCP and other out pt providers.      In addition to examining and evaluating pt, I spent additional time explaining care, normaland abnormal findings, and treatment plan. All of pt questions were answered. Counseling, diet and education were provided. Case will be discussed with nursing staff when appropriate. Family will be updated if and when appropriate.       Electronically signed by Jackie Rose DO on 11/9/2024 at 8:57 AM

## 2024-11-13 NOTE — PLAN OF CARE
Problem: Discharge Planning  Goal: Discharge to home or other facility with appropriate resources  11/13/2024 1008 by Karine Braga RN  Outcome: Progressing  11/13/2024 0024 by Lissy Egan RN  Outcome: Progressing     Problem: Skin/Tissue Integrity  Goal: Absence of new skin breakdown  Description: 1.  Monitor for areas of redness and/or skin breakdown  2.  Assess vascular access sites hourly  3.  Every 4-6 hours minimum:  Change oxygen saturation probe site  4.  Every 4-6 hours:  If on nasal continuous positive airway pressure, respiratory therapy assess nares and determine need for appliance change or resting period.  11/13/2024 1008 by Karine Braga RN  Outcome: Progressing  11/13/2024 0024 by Lissy Egan RN  Outcome: Progressing     Problem: Safety - Adult  Goal: Free from fall injury  11/13/2024 1008 by Karine Braga RN  Outcome: Progressing  11/13/2024 0024 by Lissy Egan RN  Outcome: Progressing     Problem: Pain  Goal: Verbalizes/displays adequate comfort level or baseline comfort level  11/13/2024 0024 by Lissy Egan RN  Outcome: Progressing

## 2024-11-19 ENCOUNTER — NURSING HOME VISIT (OUTPATIENT)
Dept: POST ACUTE CARE | Facility: EXTERNAL LOCATION | Age: 54
End: 2024-11-19

## 2024-11-19 DIAGNOSIS — J96.22 ACUTE ON CHRONIC RESPIRATORY FAILURE WITH HYPERCAPNIA (MULTI): ICD-10-CM

## 2024-11-19 DIAGNOSIS — I50.32 CHRONIC HEART FAILURE WITH PRESERVED EJECTION FRACTION: ICD-10-CM

## 2024-11-19 DIAGNOSIS — I89.0 ACQUIRED LYMPHEDEMA: Primary | ICD-10-CM

## 2024-11-19 DIAGNOSIS — E66.2 OBESITY HYPOVENTILATION SYNDROME (MULTI): ICD-10-CM

## 2024-11-19 DIAGNOSIS — E66.01 MORBID OBESITY (MULTI): ICD-10-CM

## 2024-11-19 NOTE — LETTER
Patient: Mel Araiza  : 1970    Encounter Date: 2024    Subjective  Patient ID: Mel Araiza is a 54 y.o. female.    54-year-old female with past medical history of obesity, depression, lymphedema, who presents to the hospital due to worsening dyspnea on exertion and leg wounds.  She is over the past couple weeks has been having more dyspnea on exertion.  Otherwise, was admitted as she was in the low 80s, and admitted for further management fluid overload and CHF exacerbation.  Patient was admitted for further management and overall medically optimized was placed on oxygen at discharge.  She reports that this is strange for her as she has never been on oxygen.  Is tolerating her new medications.  Otherwise, endorses no acute issues or concerns, and overall feels well.        Review of Systems   Constitutional:  Positive for fatigue.   HENT: Negative.     Respiratory:  Positive for shortness of breath.    Cardiovascular:  Positive for leg swelling.   Gastrointestinal: Negative.    Musculoskeletal: Negative.    Neurological:  Positive for weakness.   Psychiatric/Behavioral: Negative.         ObjectiveVitals Reviewed via facility EMR   Physical Exam  Constitutional:       General: She is not in acute distress.     Appearance: She is not ill-appearing.   Eyes:      Pupils: Pupils are equal, round, and reactive to light.   Cardiovascular:      Rate and Rhythm: Normal rate and regular rhythm.      Pulses: Normal pulses.      Heart sounds: No murmur heard.  Pulmonary:      Effort: No respiratory distress.      Breath sounds: No wheezing.      Comments: On o2, dec oxygen  Abdominal:      General: Abdomen is flat. Bowel sounds are normal. There is no distension.      Comments: obese   Musculoskeletal:      Right lower leg: Edema present.      Left lower leg: Edema present.   Skin:     General: Skin is warm and dry.   Neurological:      Mental Status: She is alert. Mental status is at baseline.      Cranial Nerves:  No cranial nerve deficit.      Motor: Weakness present.   Psychiatric:         Mood and Affect: Mood normal.         Behavior: Behavior normal.         Assessment/Plan  Diagnoses and all orders for this visit:  Acquired lymphedema  Obesity hypoventilation syndrome (Multi)  Acute on chronic respiratory failure with hypercapnia (Multi)  Morbid obesity (Multi)  Chronic heart failure with preserved ejection fraction      Patient seen and examined hospital course reviewed and medications reviewed and reconciled, she is new onset heart failure extensive discussion about heart failure management moving forward.  Continue closely monitor fluid status as well as daily weights.  Encouraged low-salt diet.  Otherwise, no additional issues or concerns at this time discussed care plan with staff.    Reviewed and approved by ANDREA GORDON on 11/21/24 at 10:51 PM.       Electronically Signed By: Andrea Gordon DO   11/21/24 10:52 PM

## 2024-11-21 PROBLEM — I50.32 CHRONIC HEART FAILURE WITH PRESERVED EJECTION FRACTION: Status: ACTIVE | Noted: 2024-11-21

## 2024-11-21 PROBLEM — E66.01 MORBID OBESITY (MULTI): Status: ACTIVE | Noted: 2024-11-21

## 2024-11-21 PROBLEM — E66.2 OBESITY HYPOVENTILATION SYNDROME (MULTI): Status: ACTIVE | Noted: 2024-11-21

## 2024-11-21 PROBLEM — J96.22 ACUTE ON CHRONIC RESPIRATORY FAILURE WITH HYPERCAPNIA (MULTI): Status: ACTIVE | Noted: 2024-11-21

## 2024-11-21 PROBLEM — I89.0 ACQUIRED LYMPHEDEMA: Status: ACTIVE | Noted: 2024-11-21

## 2024-11-21 ASSESSMENT — ENCOUNTER SYMPTOMS
SHORTNESS OF BREATH: 1
PSYCHIATRIC NEGATIVE: 1
MUSCULOSKELETAL NEGATIVE: 1
FATIGUE: 1
WEAKNESS: 1
GASTROINTESTINAL NEGATIVE: 1

## 2024-11-22 NOTE — PROGRESS NOTES
Subjective   Patient ID: Mel Araiza is a 54 y.o. female.    54-year-old female with past medical history of obesity, depression, lymphedema, who presents to the hospital due to worsening dyspnea on exertion and leg wounds.  She is over the past couple weeks has been having more dyspnea on exertion.  Otherwise, was admitted as she was in the low 80s, and admitted for further management fluid overload and CHF exacerbation.  Patient was admitted for further management and overall medically optimized was placed on oxygen at discharge.  She reports that this is strange for her as she has never been on oxygen.  Is tolerating her new medications.  Otherwise, endorses no acute issues or concerns, and overall feels well.        Review of Systems   Constitutional:  Positive for fatigue.   HENT: Negative.     Respiratory:  Positive for shortness of breath.    Cardiovascular:  Positive for leg swelling.   Gastrointestinal: Negative.    Musculoskeletal: Negative.    Neurological:  Positive for weakness.   Psychiatric/Behavioral: Negative.         Objective Vitals Reviewed via facility EMR   Physical Exam  Constitutional:       General: She is not in acute distress.     Appearance: She is not ill-appearing.   Eyes:      Pupils: Pupils are equal, round, and reactive to light.   Cardiovascular:      Rate and Rhythm: Normal rate and regular rhythm.      Pulses: Normal pulses.      Heart sounds: No murmur heard.  Pulmonary:      Effort: No respiratory distress.      Breath sounds: No wheezing.      Comments: On o2, dec oxygen  Abdominal:      General: Abdomen is flat. Bowel sounds are normal. There is no distension.      Comments: obese   Musculoskeletal:      Right lower leg: Edema present.      Left lower leg: Edema present.   Skin:     General: Skin is warm and dry.   Neurological:      Mental Status: She is alert. Mental status is at baseline.      Cranial Nerves: No cranial nerve deficit.      Motor: Weakness present.    Psychiatric:         Mood and Affect: Mood normal.         Behavior: Behavior normal.         Assessment/Plan   Diagnoses and all orders for this visit:  Acquired lymphedema  Obesity hypoventilation syndrome (Multi)  Acute on chronic respiratory failure with hypercapnia (Multi)  Morbid obesity (Multi)  Chronic heart failure with preserved ejection fraction      Patient seen and examined hospital course reviewed and medications reviewed and reconciled, she is new onset heart failure extensive discussion about heart failure management moving forward.  Continue closely monitor fluid status as well as daily weights.  Encouraged low-salt diet.  Otherwise, no additional issues or concerns at this time discussed care plan with staff.    Reviewed and approved by AMIRA GORDON on 11/21/24 at 10:51 PM.

## 2024-11-26 ENCOUNTER — NURSING HOME VISIT (OUTPATIENT)
Dept: POST ACUTE CARE | Facility: EXTERNAL LOCATION | Age: 54
End: 2024-11-26
Payer: COMMERCIAL

## 2024-11-26 DIAGNOSIS — I89.0 ACQUIRED LYMPHEDEMA: ICD-10-CM

## 2024-11-26 DIAGNOSIS — E66.01 MORBID OBESITY (MULTI): Primary | ICD-10-CM

## 2024-11-26 DIAGNOSIS — I50.32 CHRONIC HEART FAILURE WITH PRESERVED EJECTION FRACTION: ICD-10-CM

## 2024-11-26 DIAGNOSIS — E66.2 OBESITY HYPOVENTILATION SYNDROME (MULTI): ICD-10-CM

## 2024-11-26 PROCEDURE — 99308 SBSQ NF CARE LOW MDM 20: CPT | Performed by: STUDENT IN AN ORGANIZED HEALTH CARE EDUCATION/TRAINING PROGRAM

## 2024-11-26 NOTE — LETTER
Patient: Mel Araiza  : 1970    Encounter Date: 2024    Subjective  Patient ID: Mel Araiza is a 54 y.o. female.    Patient seen and examined at bedside.  She states that she is overall doing well, and is slowly improving with physical therapy still struggling with oxygenation as well as lower extremity swelling but it is still swollen in nature.  States that is a work in progress but is responding to diuretics.  Otherwise, endorses no acute issues or concerns.  Feels overall well.        Review of Systems   Constitutional: Negative.    HENT: Negative.     Respiratory: Negative.     Cardiovascular: Negative.    Gastrointestinal: Negative.    Musculoskeletal: Negative.    Neurological: Negative.    Psychiatric/Behavioral: Negative.         ObjectiveVitals Reviewed via facility EMR   Physical Exam  Constitutional:       General: She is not in acute distress.     Appearance: She is not ill-appearing.   Eyes:      Pupils: Pupils are equal, round, and reactive to light.   Cardiovascular:      Rate and Rhythm: Normal rate and regular rhythm.      Pulses: Normal pulses.      Heart sounds: No murmur heard.  Pulmonary:      Effort: No respiratory distress.      Breath sounds: No wheezing.   Abdominal:      General: Abdomen is flat. Bowel sounds are normal. There is no distension.      Comments: Obese, on o2   Musculoskeletal:      Right lower leg: No edema.      Left lower leg: No edema.   Skin:     General: Skin is warm and dry.   Neurological:      Mental Status: She is alert. Mental status is at baseline.      Cranial Nerves: No cranial nerve deficit.      Motor: Weakness present.   Psychiatric:         Mood and Affect: Mood normal.         Behavior: Behavior normal.         Assessment/Plan  Diagnoses and all orders for this visit:  Morbid obesity (Multi)  Chronic heart failure with preserved ejection fraction  Acquired lymphedema  Obesity hypoventilation syndrome (Multi)      Patient seen and examined at  bedside.  Overall medically stable, continue optimization with physical therapy, closely monitor weights and optimize diuretics.  She is adherent to low-salt diet and fluid restriction.  Otherwise, states no additional issues or concerns and overall feels well.    Reviewed and approved by ANDREA GORDON on 11/28/24 at 2:56 PM.       Electronically Signed By: Andrea Gordon DO   11/28/24  2:56 PM

## 2024-11-28 ASSESSMENT — ENCOUNTER SYMPTOMS
RESPIRATORY NEGATIVE: 1
MUSCULOSKELETAL NEGATIVE: 1
CARDIOVASCULAR NEGATIVE: 1
CONSTITUTIONAL NEGATIVE: 1
NEUROLOGICAL NEGATIVE: 1
GASTROINTESTINAL NEGATIVE: 1
PSYCHIATRIC NEGATIVE: 1

## 2024-11-28 NOTE — PROGRESS NOTES
Subjective   Patient ID: Mel Araiza is a 54 y.o. female.    Patient seen and examined at bedside.  She states that she is overall doing well, and is slowly improving with physical therapy still struggling with oxygenation as well as lower extremity swelling but it is still swollen in nature.  States that is a work in progress but is responding to diuretics.  Otherwise, endorses no acute issues or concerns.  Feels overall well.        Review of Systems   Constitutional: Negative.    HENT: Negative.     Respiratory: Negative.     Cardiovascular: Negative.    Gastrointestinal: Negative.    Musculoskeletal: Negative.    Neurological: Negative.    Psychiatric/Behavioral: Negative.         Objective Vitals Reviewed via facility EMR   Physical Exam  Constitutional:       General: She is not in acute distress.     Appearance: She is not ill-appearing.   Eyes:      Pupils: Pupils are equal, round, and reactive to light.   Cardiovascular:      Rate and Rhythm: Normal rate and regular rhythm.      Pulses: Normal pulses.      Heart sounds: No murmur heard.  Pulmonary:      Effort: No respiratory distress.      Breath sounds: No wheezing.   Abdominal:      General: Abdomen is flat. Bowel sounds are normal. There is no distension.      Comments: Obese, on o2   Musculoskeletal:      Right lower leg: No edema.      Left lower leg: No edema.   Skin:     General: Skin is warm and dry.   Neurological:      Mental Status: She is alert. Mental status is at baseline.      Cranial Nerves: No cranial nerve deficit.      Motor: Weakness present.   Psychiatric:         Mood and Affect: Mood normal.         Behavior: Behavior normal.         Assessment/Plan   Diagnoses and all orders for this visit:  Morbid obesity (Multi)  Chronic heart failure with preserved ejection fraction  Acquired lymphedema  Obesity hypoventilation syndrome (Multi)      Patient seen and examined at bedside.  Overall medically stable, continue optimization with  physical therapy, closely monitor weights and optimize diuretics.  She is adherent to low-salt diet and fluid restriction.  Otherwise, states no additional issues or concerns and overall feels well.    Reviewed and approved by AMIRA GORDON on 11/28/24 at 2:56 PM.

## 2024-12-04 ENCOUNTER — NURSING HOME VISIT (OUTPATIENT)
Dept: POST ACUTE CARE | Facility: EXTERNAL LOCATION | Age: 54
End: 2024-12-04
Payer: COMMERCIAL

## 2024-12-04 DIAGNOSIS — I89.0 ACQUIRED LYMPHEDEMA: ICD-10-CM

## 2024-12-04 DIAGNOSIS — J96.22 ACUTE ON CHRONIC RESPIRATORY FAILURE WITH HYPERCAPNIA (MULTI): ICD-10-CM

## 2024-12-04 DIAGNOSIS — E66.01 MORBID OBESITY (MULTI): ICD-10-CM

## 2024-12-04 DIAGNOSIS — I50.32 CHRONIC HEART FAILURE WITH PRESERVED EJECTION FRACTION: Primary | ICD-10-CM

## 2024-12-04 DIAGNOSIS — E66.2 OBESITY HYPOVENTILATION SYNDROME (MULTI): ICD-10-CM

## 2024-12-04 NOTE — LETTER
Patient: Mel Araiza  : 1970    Encounter Date: 2024    Subjective  Patient ID: Mel Araiza is a 54 y.o. female.    Patient seen on routine evaluation.  She states that she is overall doing well, and has had a significant improvement with regards to her level of functioning.  Feels as if she is going through therapy, and is overall improving with regards to this.  States her biggest issue today, is that she has had significant issues with her knees.  Understands that this is likely orthopedic related and that she also likely is not a surgical candidate but she does want to get evaluated in order to see what management options are available for her.  Otherwise, endorses no additional issues or concerns and overall feels well.        Review of Systems   Constitutional:  Positive for fatigue.   HENT: Negative.     Respiratory: Negative.     Cardiovascular: Negative.    Gastrointestinal: Negative.    Musculoskeletal: Negative.    Neurological:  Positive for weakness.   Psychiatric/Behavioral: Negative.         ObjectiveVitals Reviewed via facility EMR   Physical Exam  Constitutional:       General: She is not in acute distress.     Appearance: She is not ill-appearing.   Eyes:      Pupils: Pupils are equal, round, and reactive to light.   Cardiovascular:      Rate and Rhythm: Normal rate and regular rhythm.      Pulses: Normal pulses.      Heart sounds: No murmur heard.  Pulmonary:      Effort: No respiratory distress.      Breath sounds: No wheezing.   Abdominal:      General: Abdomen is flat. Bowel sounds are normal. There is no distension.   Musculoskeletal:         General: Swelling and tenderness present.      Right lower leg: No edema.      Left lower leg: No edema.   Skin:     General: Skin is warm and dry.   Neurological:      Mental Status: She is alert. Mental status is at baseline.      Cranial Nerves: No cranial nerve deficit.      Motor: Weakness present.   Psychiatric:         Mood and Affect:  Mood normal.         Behavior: Behavior normal.         Assessment/Plan  Diagnoses and all orders for this visit:  Chronic heart failure with preserved ejection fraction  Obesity hypoventilation syndrome (Multi)  Morbid obesity (Multi)  Acquired lymphedema  Acute on chronic respiratory failure with hypercapnia (Multi)    Patient seen and examined at bedside.  Overall is improving with therapy and has been weaned down on her oxygenation.  Due to this, and significant arthritis type of issues, recommend Voltaren gel as needed.  In addition place orthopedic referral.  She likely is a good candidate for injections, however surgery likely would be out of the option unless she is able to lose a significant amount of weight.  Patient voices understanding with regards to this.  Otherwise continue optimization with physical therapy.  No additional issues at this time.    Reviewed and approved by ANDREA GORDON on 12/5/24 at 8:37 PM.       Electronically Signed By: Andrea Gordon DO   12/5/24  8:37 PM

## 2024-12-05 ASSESSMENT — ENCOUNTER SYMPTOMS
MUSCULOSKELETAL NEGATIVE: 1
FATIGUE: 1
WEAKNESS: 1
CARDIOVASCULAR NEGATIVE: 1
GASTROINTESTINAL NEGATIVE: 1
RESPIRATORY NEGATIVE: 1
PSYCHIATRIC NEGATIVE: 1

## 2024-12-06 NOTE — PROGRESS NOTES
Subjective   Patient ID: Mel Araiza is a 54 y.o. female.    Patient seen on routine evaluation.  She states that she is overall doing well, and has had a significant improvement with regards to her level of functioning.  Feels as if she is going through therapy, and is overall improving with regards to this.  States her biggest issue today, is that she has had significant issues with her knees.  Understands that this is likely orthopedic related and that she also likely is not a surgical candidate but she does want to get evaluated in order to see what management options are available for her.  Otherwise, endorses no additional issues or concerns and overall feels well.        Review of Systems   Constitutional:  Positive for fatigue.   HENT: Negative.     Respiratory: Negative.     Cardiovascular: Negative.    Gastrointestinal: Negative.    Musculoskeletal: Negative.    Neurological:  Positive for weakness.   Psychiatric/Behavioral: Negative.         Objective Vitals Reviewed via facility EMR   Physical Exam  Constitutional:       General: She is not in acute distress.     Appearance: She is not ill-appearing.   Eyes:      Pupils: Pupils are equal, round, and reactive to light.   Cardiovascular:      Rate and Rhythm: Normal rate and regular rhythm.      Pulses: Normal pulses.      Heart sounds: No murmur heard.  Pulmonary:      Effort: No respiratory distress.      Breath sounds: No wheezing.   Abdominal:      General: Abdomen is flat. Bowel sounds are normal. There is no distension.   Musculoskeletal:         General: Swelling and tenderness present.      Right lower leg: No edema.      Left lower leg: No edema.   Skin:     General: Skin is warm and dry.   Neurological:      Mental Status: She is alert. Mental status is at baseline.      Cranial Nerves: No cranial nerve deficit.      Motor: Weakness present.   Psychiatric:         Mood and Affect: Mood normal.         Behavior: Behavior normal.          Assessment/Plan   Diagnoses and all orders for this visit:  Chronic heart failure with preserved ejection fraction  Obesity hypoventilation syndrome (Multi)  Morbid obesity (Multi)  Acquired lymphedema  Acute on chronic respiratory failure with hypercapnia (Multi)    Patient seen and examined at bedside.  Overall is improving with therapy and has been weaned down on her oxygenation.  Due to this, and significant arthritis type of issues, recommend Voltaren gel as needed.  In addition place orthopedic referral.  She likely is a good candidate for injections, however surgery likely would be out of the option unless she is able to lose a significant amount of weight.  Patient voices understanding with regards to this.  Otherwise continue optimization with physical therapy.  No additional issues at this time.    Reviewed and approved by AMIRA GORDON on 12/5/24 at 8:37 PM.

## 2024-12-12 ENCOUNTER — NURSING HOME VISIT (OUTPATIENT)
Dept: POST ACUTE CARE | Facility: EXTERNAL LOCATION | Age: 54
End: 2024-12-12
Payer: COMMERCIAL

## 2024-12-12 DIAGNOSIS — R06.02 SHORTNESS OF BREATH: ICD-10-CM

## 2024-12-12 DIAGNOSIS — I50.32 CHRONIC HEART FAILURE WITH PRESERVED EJECTION FRACTION: ICD-10-CM

## 2024-12-12 DIAGNOSIS — I89.0 ACQUIRED LYMPHEDEMA: ICD-10-CM

## 2024-12-12 DIAGNOSIS — E66.2 OBESITY HYPOVENTILATION SYNDROME (MULTI): ICD-10-CM

## 2024-12-12 DIAGNOSIS — E66.01 MORBID OBESITY (MULTI): Primary | ICD-10-CM

## 2024-12-12 NOTE — LETTER
Patient: Mel Araiza  : 1970    Encounter Date: 2024    Subjective  Patient ID: Mel Araiza is a 54 y.o. female.    Patient seen and examined on routine evaluation, she regards that she is overall doing well, however is getting persistent shortness of breath intermittently.  States that this causes difficulty with sleeping at night, and she feels like she has less energy overall.  Otherwise has been working well with physical therapy, denies any additional issues or concerns at this time.  No additional issues at this time.        Review of Systems   Constitutional:  Positive for fatigue.   HENT: Negative.     Respiratory:  Positive for shortness of breath.    Cardiovascular: Negative.    Gastrointestinal: Negative.    Musculoskeletal: Negative.    Neurological:  Positive for weakness.   Psychiatric/Behavioral: Negative.         ObjectiveVitals Reviewed via facility EMR   Physical Exam  Constitutional:       General: She is not in acute distress.     Appearance: She is not ill-appearing.   Eyes:      Pupils: Pupils are equal, round, and reactive to light.   Cardiovascular:      Rate and Rhythm: Normal rate and regular rhythm.      Pulses: Normal pulses.      Heart sounds: No murmur heard.  Pulmonary:      Effort: No respiratory distress.      Breath sounds: No wheezing.      Comments: Dec breath sounds  Abdominal:      General: Abdomen is flat. Bowel sounds are normal. There is no distension.   Musculoskeletal:      Right lower leg: Edema present.      Left lower leg: Edema present.   Skin:     General: Skin is warm and dry.   Neurological:      Mental Status: She is alert. Mental status is at baseline.      Cranial Nerves: No cranial nerve deficit.      Motor: Weakness present.   Psychiatric:         Mood and Affect: Mood normal.         Behavior: Behavior normal.         Assessment/Plan  Diagnoses and all orders for this visit:  Morbid obesity (Multi)  Obesity hypoventilation syndrome (Multi)  Acquired  lymphedema  Chronic heart failure with preserved ejection fraction  Shortness of breath    Patient seen and examined at bedside.  Overall medically stable recommend that patient might ultimately benefit from BiPAP versus CPAP as she continues to have symptoms of obesity hypoventilation.  Check CBC BMP BNP.  Otherwise continue supportive care with physical therapy, no additional issues at this time.    Reviewed and approved by ANDREA GORDON on 12/14/24 at 10:34 AM.         Electronically Signed By: Andrea Gordon DO   12/14/24 10:34 AM

## 2024-12-14 PROBLEM — R06.02 SHORTNESS OF BREATH: Status: ACTIVE | Noted: 2024-12-14

## 2024-12-14 ASSESSMENT — ENCOUNTER SYMPTOMS
SHORTNESS OF BREATH: 1
PSYCHIATRIC NEGATIVE: 1
MUSCULOSKELETAL NEGATIVE: 1
CARDIOVASCULAR NEGATIVE: 1
WEAKNESS: 1
GASTROINTESTINAL NEGATIVE: 1
FATIGUE: 1

## 2024-12-14 NOTE — PROGRESS NOTES
Subjective   Patient ID: Mel Araiza is a 54 y.o. female.    Patient seen and examined on routine evaluation, she regards that she is overall doing well, however is getting persistent shortness of breath intermittently.  States that this causes difficulty with sleeping at night, and she feels like she has less energy overall.  Otherwise has been working well with physical therapy, denies any additional issues or concerns at this time.  No additional issues at this time.        Review of Systems   Constitutional:  Positive for fatigue.   HENT: Negative.     Respiratory:  Positive for shortness of breath.    Cardiovascular: Negative.    Gastrointestinal: Negative.    Musculoskeletal: Negative.    Neurological:  Positive for weakness.   Psychiatric/Behavioral: Negative.         Objective Vitals Reviewed via facility EMR   Physical Exam  Constitutional:       General: She is not in acute distress.     Appearance: She is not ill-appearing.   Eyes:      Pupils: Pupils are equal, round, and reactive to light.   Cardiovascular:      Rate and Rhythm: Normal rate and regular rhythm.      Pulses: Normal pulses.      Heart sounds: No murmur heard.  Pulmonary:      Effort: No respiratory distress.      Breath sounds: No wheezing.      Comments: Dec breath sounds  Abdominal:      General: Abdomen is flat. Bowel sounds are normal. There is no distension.   Musculoskeletal:      Right lower leg: Edema present.      Left lower leg: Edema present.   Skin:     General: Skin is warm and dry.   Neurological:      Mental Status: She is alert. Mental status is at baseline.      Cranial Nerves: No cranial nerve deficit.      Motor: Weakness present.   Psychiatric:         Mood and Affect: Mood normal.         Behavior: Behavior normal.         Assessment/Plan   Diagnoses and all orders for this visit:  Morbid obesity (Multi)  Obesity hypoventilation syndrome (Multi)  Acquired lymphedema  Chronic heart failure with preserved ejection  fraction  Shortness of breath    Patient seen and examined at bedside.  Overall medically stable recommend that patient might ultimately benefit from BiPAP versus CPAP as she continues to have symptoms of obesity hypoventilation.  Check CBC BMP BNP.  Otherwise continue supportive care with physical therapy, no additional issues at this time.    Reviewed and approved by AMIRA GORDON on 12/14/24 at 10:34 AM.

## 2024-12-17 ENCOUNTER — NURSING HOME VISIT (OUTPATIENT)
Dept: POST ACUTE CARE | Facility: EXTERNAL LOCATION | Age: 54
End: 2024-12-17
Payer: MEDICARE

## 2024-12-17 DIAGNOSIS — R06.02 SHORTNESS OF BREATH: ICD-10-CM

## 2024-12-17 DIAGNOSIS — I89.0 ACQUIRED LYMPHEDEMA: ICD-10-CM

## 2024-12-17 DIAGNOSIS — E66.01 MORBID OBESITY (MULTI): ICD-10-CM

## 2024-12-17 DIAGNOSIS — J96.22 ACUTE ON CHRONIC RESPIRATORY FAILURE WITH HYPERCAPNIA (MULTI): ICD-10-CM

## 2024-12-17 DIAGNOSIS — I50.32 CHRONIC HEART FAILURE WITH PRESERVED EJECTION FRACTION: Primary | ICD-10-CM

## 2024-12-17 PROCEDURE — 99309 SBSQ NF CARE MODERATE MDM 30: CPT | Performed by: STUDENT IN AN ORGANIZED HEALTH CARE EDUCATION/TRAINING PROGRAM

## 2024-12-17 NOTE — LETTER
Patient: Mel Araiza  : 1970    Encounter Date: 2024    Subjective  Patient ID: Mel Araiza is a 54 y.o. female.    Patient seen and examined at bedside.  She regards that she is overall doing well, and slowly improving with physical therapy.  Respiratory status is overall stable and endorses that she likely will get sleep study in the coming weeks, to determine if she needs a BiPAP.  In addition, has also considering weight loss medications.  She endorses that she has been thinking about these, and does think that it would benefit her overall health.  Does understand the side effects associated with these.  Otherwise no additional issues at this time.        Review of Systems   Constitutional: Negative.    HENT: Negative.     Respiratory: Negative.     Cardiovascular: Negative.    Gastrointestinal: Negative.    Musculoskeletal: Negative.    Neurological: Negative.    Psychiatric/Behavioral: Negative.         ObjectiveVitals Reviewed via facility EMR   Physical Exam  Constitutional:       General: She is not in acute distress.     Appearance: She is not ill-appearing.   Eyes:      Pupils: Pupils are equal, round, and reactive to light.   Cardiovascular:      Rate and Rhythm: Normal rate and regular rhythm.      Pulses: Normal pulses.      Heart sounds: No murmur heard.  Pulmonary:      Effort: No respiratory distress.      Breath sounds: No wheezing.   Abdominal:      General: Abdomen is flat. Bowel sounds are normal. There is no distension.   Musculoskeletal:      Right lower leg: No edema.      Left lower leg: No edema.   Skin:     General: Skin is warm and dry.   Neurological:      Mental Status: She is alert. Mental status is at baseline.      Cranial Nerves: No cranial nerve deficit.      Motor: Weakness present.   Psychiatric:         Mood and Affect: Mood normal.         Behavior: Behavior normal.         Assessment/Plan  Diagnoses and all orders for this visit:  Chronic heart failure with  preserved ejection fraction  Morbid obesity (Multi)  Acute on chronic respiratory failure with hypercapnia (Multi)  Acquired lymphedema  Shortness of breath  Patient seen and examined on routine evaluation.  Overall medically stable.  However, would benefit from underlying GLP-1 due to extensive morbid obesity.  This overall likely would help the patient's multiple comorbidities.  Will prescribe Mounjaro 2.5 mg weekly, potentially can transition to alternative agent pending insurance authorizations.  Otherwise no additional issues.    Reviewed and approved by ANDREA GORDON on 12/18/24 at 9:54 PM.         Electronically Signed By: Andrea Gordon DO   12/18/24  9:54 PM

## 2024-12-18 ASSESSMENT — ENCOUNTER SYMPTOMS
CARDIOVASCULAR NEGATIVE: 1
PSYCHIATRIC NEGATIVE: 1
NEUROLOGICAL NEGATIVE: 1
MUSCULOSKELETAL NEGATIVE: 1
CONSTITUTIONAL NEGATIVE: 1
GASTROINTESTINAL NEGATIVE: 1
RESPIRATORY NEGATIVE: 1

## 2024-12-19 NOTE — PROGRESS NOTES
Subjective   Patient ID: Mel Araiza is a 54 y.o. female.    Patient seen and examined at bedside.  She regards that she is overall doing well, and slowly improving with physical therapy.  Respiratory status is overall stable and endorses that she likely will get sleep study in the coming weeks, to determine if she needs a BiPAP.  In addition, has also considering weight loss medications.  She endorses that she has been thinking about these, and does think that it would benefit her overall health.  Does understand the side effects associated with these.  Otherwise no additional issues at this time.        Review of Systems   Constitutional: Negative.    HENT: Negative.     Respiratory: Negative.     Cardiovascular: Negative.    Gastrointestinal: Negative.    Musculoskeletal: Negative.    Neurological: Negative.    Psychiatric/Behavioral: Negative.         Objective Vitals Reviewed via facility EMR   Physical Exam  Constitutional:       General: She is not in acute distress.     Appearance: She is not ill-appearing.   Eyes:      Pupils: Pupils are equal, round, and reactive to light.   Cardiovascular:      Rate and Rhythm: Normal rate and regular rhythm.      Pulses: Normal pulses.      Heart sounds: No murmur heard.  Pulmonary:      Effort: No respiratory distress.      Breath sounds: No wheezing.   Abdominal:      General: Abdomen is flat. Bowel sounds are normal. There is no distension.   Musculoskeletal:      Right lower leg: No edema.      Left lower leg: No edema.   Skin:     General: Skin is warm and dry.   Neurological:      Mental Status: She is alert. Mental status is at baseline.      Cranial Nerves: No cranial nerve deficit.      Motor: Weakness present.   Psychiatric:         Mood and Affect: Mood normal.         Behavior: Behavior normal.         Assessment/Plan   Diagnoses and all orders for this visit:  Chronic heart failure with preserved ejection fraction  Morbid obesity (Multi)  Acute on chronic  respiratory failure with hypercapnia (Multi)  Acquired lymphedema  Shortness of breath  Patient seen and examined on routine evaluation.  Overall medically stable.  However, would benefit from underlying GLP-1 due to extensive morbid obesity.  This overall likely would help the patient's multiple comorbidities.  Will prescribe Mounjaro 2.5 mg weekly, potentially can transition to alternative agent pending insurance authorizations.  Otherwise no additional issues.    Reviewed and approved by AMIRA GORDON on 12/18/24 at 9:54 PM.

## 2024-12-30 ENCOUNTER — NURSING HOME VISIT (OUTPATIENT)
Dept: POST ACUTE CARE | Facility: EXTERNAL LOCATION | Age: 54
End: 2024-12-30
Payer: MEDICARE

## 2024-12-30 DIAGNOSIS — I50.32 CHRONIC HEART FAILURE WITH PRESERVED EJECTION FRACTION: ICD-10-CM

## 2024-12-30 DIAGNOSIS — J96.22 ACUTE ON CHRONIC RESPIRATORY FAILURE WITH HYPERCAPNIA (MULTI): ICD-10-CM

## 2024-12-30 DIAGNOSIS — J06.9 VIRAL UPPER RESPIRATORY TRACT INFECTION: ICD-10-CM

## 2024-12-30 DIAGNOSIS — E66.01 MORBID OBESITY (MULTI): Primary | ICD-10-CM

## 2024-12-30 PROCEDURE — 99309 SBSQ NF CARE MODERATE MDM 30: CPT | Performed by: STUDENT IN AN ORGANIZED HEALTH CARE EDUCATION/TRAINING PROGRAM

## 2024-12-30 NOTE — LETTER
Patient: Mel Araiza  : 1970    Encounter Date: 2024    Subjective  Patient ID: Mel Araiza is a 54 y.o. female.    Patient seen and examined at bedside.  She regards that she is overall doing well however over the past couple days have been fairly congested.  She also endorses that she is having some lower extremity swelling that is significantly worse and that she has not had her lymphedema pumps.  Otherwise, she endorses that she is overall doing well.  States no acute issues or concerns but does still feel under the weather.  She did test negative for COVID as well.        Review of Systems   Constitutional:  Positive for fatigue.   HENT:  Positive for congestion.    Respiratory:  Positive for shortness of breath.    Cardiovascular: Negative.    Gastrointestinal: Negative.    Musculoskeletal: Negative.    Neurological:  Positive for weakness.   Psychiatric/Behavioral: Negative.         ObjectiveVitals Reviewed via facility EMR   Physical Exam  Constitutional:       General: She is not in acute distress.     Appearance: She is not ill-appearing.   HENT:      Head: Head contusion: sinus congestion.   Eyes:      Pupils: Pupils are equal, round, and reactive to light.   Cardiovascular:      Rate and Rhythm: Normal rate and regular rhythm.      Pulses: Normal pulses.      Heart sounds: No murmur heard.  Pulmonary:      Effort: No respiratory distress.      Breath sounds: No wheezing.   Abdominal:      General: Abdomen is flat. Bowel sounds are normal. There is no distension.   Musculoskeletal:      Right lower leg: Edema present.      Left lower leg: Edema present.      Comments: Worsening edema   Skin:     General: Skin is warm and dry.   Neurological:      Mental Status: She is alert. Mental status is at baseline.      Cranial Nerves: No cranial nerve deficit.      Motor: Weakness present.   Psychiatric:         Mood and Affect: Mood normal.         Behavior: Behavior normal.          Assessment/Plan  Diagnoses and all orders for this visit:  Morbid obesity (Multi)  Chronic heart failure with preserved ejection fraction  Acute on chronic respiratory failure with hypercapnia (Multi)  Viral upper respiratory tract infection    Patient seen and examined at bedside.  Overall medically stable however does have some symptoms of upper respiratory infection.  I suspect that this is initially viral in nature however Likely transition to bacterial infection.  Will start course of azithromycin, and closely monitor symptoms.  Due to lower extremity swelling will place referral for lymphedema pumps.  In addition, will increase Lasix to 40 daily for the next for 5 days.  Otherwise no additional issues at this time.  Discussed care plan with staff and no issues noted.    Reviewed and approved by ANDREA GORDON on 12/31/24 at 10:10 PM.       Electronically Signed By: Andrea Gordon DO   12/31/24 10:10 PM

## 2024-12-31 PROBLEM — J06.9 VIRAL UPPER RESPIRATORY TRACT INFECTION: Status: ACTIVE | Noted: 2024-12-31

## 2024-12-31 ASSESSMENT — ENCOUNTER SYMPTOMS
FATIGUE: 1
SHORTNESS OF BREATH: 1
CARDIOVASCULAR NEGATIVE: 1
WEAKNESS: 1
GASTROINTESTINAL NEGATIVE: 1
PSYCHIATRIC NEGATIVE: 1
MUSCULOSKELETAL NEGATIVE: 1

## 2025-01-01 NOTE — PROGRESS NOTES
Subjective   Patient ID: Mel Araiza is a 54 y.o. female.    Patient seen and examined at bedside.  She regards that she is overall doing well however over the past couple days have been fairly congested.  She also endorses that she is having some lower extremity swelling that is significantly worse and that she has not had her lymphedema pumps.  Otherwise, she endorses that she is overall doing well.  States no acute issues or concerns but does still feel under the weather.  She did test negative for COVID as well.        Review of Systems   Constitutional:  Positive for fatigue.   HENT:  Positive for congestion.    Respiratory:  Positive for shortness of breath.    Cardiovascular: Negative.    Gastrointestinal: Negative.    Musculoskeletal: Negative.    Neurological:  Positive for weakness.   Psychiatric/Behavioral: Negative.         Objective Vitals Reviewed via facility EMR   Physical Exam  Constitutional:       General: She is not in acute distress.     Appearance: She is not ill-appearing.   HENT:      Head: Head contusion: sinus congestion.   Eyes:      Pupils: Pupils are equal, round, and reactive to light.   Cardiovascular:      Rate and Rhythm: Normal rate and regular rhythm.      Pulses: Normal pulses.      Heart sounds: No murmur heard.  Pulmonary:      Effort: No respiratory distress.      Breath sounds: No wheezing.   Abdominal:      General: Abdomen is flat. Bowel sounds are normal. There is no distension.   Musculoskeletal:      Right lower leg: Edema present.      Left lower leg: Edema present.      Comments: Worsening edema   Skin:     General: Skin is warm and dry.   Neurological:      Mental Status: She is alert. Mental status is at baseline.      Cranial Nerves: No cranial nerve deficit.      Motor: Weakness present.   Psychiatric:         Mood and Affect: Mood normal.         Behavior: Behavior normal.         Assessment/Plan   Diagnoses and all orders for this visit:  Morbid obesity  (Multi)  Chronic heart failure with preserved ejection fraction  Acute on chronic respiratory failure with hypercapnia (Multi)  Viral upper respiratory tract infection    Patient seen and examined at bedside.  Overall medically stable however does have some symptoms of upper respiratory infection.  I suspect that this is initially viral in nature however Likely transition to bacterial infection.  Will start course of azithromycin, and closely monitor symptoms.  Due to lower extremity swelling will place referral for lymphedema pumps.  In addition, will increase Lasix to 40 daily for the next for 5 days.  Otherwise no additional issues at this time.  Discussed care plan with staff and no issues noted.    Reviewed and approved by AMIRA GORDON on 12/31/24 at 10:10 PM.

## 2025-01-09 ENCOUNTER — NURSING HOME VISIT (OUTPATIENT)
Dept: POST ACUTE CARE | Facility: EXTERNAL LOCATION | Age: 55
End: 2025-01-09
Payer: MEDICARE

## 2025-01-09 DIAGNOSIS — E66.01 MORBID OBESITY (MULTI): ICD-10-CM

## 2025-01-09 DIAGNOSIS — I50.32 CHRONIC HEART FAILURE WITH PRESERVED EJECTION FRACTION: Primary | ICD-10-CM

## 2025-01-09 DIAGNOSIS — J96.22 ACUTE ON CHRONIC RESPIRATORY FAILURE WITH HYPERCAPNIA (MULTI): ICD-10-CM

## 2025-01-09 DIAGNOSIS — I89.0 ACQUIRED LYMPHEDEMA: ICD-10-CM

## 2025-01-09 PROCEDURE — 99308 SBSQ NF CARE LOW MDM 20: CPT | Performed by: STUDENT IN AN ORGANIZED HEALTH CARE EDUCATION/TRAINING PROGRAM

## 2025-01-09 ASSESSMENT — ENCOUNTER SYMPTOMS
MUSCULOSKELETAL NEGATIVE: 1
RESPIRATORY NEGATIVE: 1
GASTROINTESTINAL NEGATIVE: 1
WEAKNESS: 1
PSYCHIATRIC NEGATIVE: 1
FATIGUE: 1
CARDIOVASCULAR NEGATIVE: 1

## 2025-01-09 NOTE — LETTER
Patient: Mel Araiza  : 1970    Encounter Date: 2025    Subjective  Patient ID: Mel Araiza is a 54 y.o. female.    Patient seen on routine evaluation.  She regards that overall she has been feeling significantly better than on previous evaluations.  Is tolerating the diuretics, endorses that they are frustrating as she does urinate fairly frequently, however notes that breathing status is overall improving with use of diuretics.  Is slowly weaning off of oxygen and gaining her strength back.  As well.  Was wondering what lymphedema therapy and how it could benefit her.  Otherwise, endorses no acute issues or concerns at this time.        Review of Systems   Constitutional:  Positive for fatigue.   HENT: Negative.     Respiratory: Negative.     Cardiovascular: Negative.    Gastrointestinal: Negative.    Musculoskeletal: Negative.    Neurological:  Positive for weakness.   Psychiatric/Behavioral: Negative.         ObjectiveVitals Reviewed via facility EMR   Physical Exam  Constitutional:       General: She is not in acute distress.     Appearance: She is not ill-appearing.   Eyes:      Pupils: Pupils are equal, round, and reactive to light.   Cardiovascular:      Rate and Rhythm: Normal rate and regular rhythm.      Pulses: Normal pulses.      Heart sounds: No murmur heard.  Pulmonary:      Effort: No respiratory distress.      Breath sounds: No wheezing.   Abdominal:      General: Abdomen is flat. Bowel sounds are normal. There is no distension.   Musculoskeletal:      Right lower leg: Edema present.      Left lower leg: Edema present.      Comments: improved   Skin:     General: Skin is warm and dry.   Neurological:      Mental Status: She is alert. Mental status is at baseline.      Cranial Nerves: No cranial nerve deficit.      Motor: Weakness present.   Psychiatric:         Mood and Affect: Mood normal.         Behavior: Behavior normal.         Assessment/Plan  Diagnoses and all orders for this  visit:  Chronic heart failure with preserved ejection fraction  Acute on chronic respiratory failure with hypercapnia (Multi)  Acquired lymphedema  Morbid obesity (Multi)    Patient seen and examined at bedside.  Overall medically stable, and overall much improved.  Discussed lymphedema therapy and highly advised patient and follow-up with this as it likely would help with her fluid status and overall level of functioning.  She is agreeable.  Continue optimize fluid status electrolytes and continue to wean down on O2.  Otherwise, continue supportive care.  No additional issues at this time.    Reviewed and approved by ANDREA GORDON on 1/9/25 at 9:46 PM.       Electronically Signed By: Andrea Gordon DO   1/9/25  9:47 PM

## 2025-01-10 NOTE — PROGRESS NOTES
Subjective   Patient ID: Mel Araiza is a 54 y.o. female.    Patient seen on routine evaluation.  She regards that overall she has been feeling significantly better than on previous evaluations.  Is tolerating the diuretics, endorses that they are frustrating as she does urinate fairly frequently, however notes that breathing status is overall improving with use of diuretics.  Is slowly weaning off of oxygen and gaining her strength back.  As well.  Was wondering what lymphedema therapy and how it could benefit her.  Otherwise, endorses no acute issues or concerns at this time.        Review of Systems   Constitutional:  Positive for fatigue.   HENT: Negative.     Respiratory: Negative.     Cardiovascular: Negative.    Gastrointestinal: Negative.    Musculoskeletal: Negative.    Neurological:  Positive for weakness.   Psychiatric/Behavioral: Negative.         Objective Vitals Reviewed via facility EMR   Physical Exam  Constitutional:       General: She is not in acute distress.     Appearance: She is not ill-appearing.   Eyes:      Pupils: Pupils are equal, round, and reactive to light.   Cardiovascular:      Rate and Rhythm: Normal rate and regular rhythm.      Pulses: Normal pulses.      Heart sounds: No murmur heard.  Pulmonary:      Effort: No respiratory distress.      Breath sounds: No wheezing.   Abdominal:      General: Abdomen is flat. Bowel sounds are normal. There is no distension.   Musculoskeletal:      Right lower leg: Edema present.      Left lower leg: Edema present.      Comments: improved   Skin:     General: Skin is warm and dry.   Neurological:      Mental Status: She is alert. Mental status is at baseline.      Cranial Nerves: No cranial nerve deficit.      Motor: Weakness present.   Psychiatric:         Mood and Affect: Mood normal.         Behavior: Behavior normal.         Assessment/Plan   Diagnoses and all orders for this visit:  Chronic heart failure with preserved ejection fraction  Acute  on chronic respiratory failure with hypercapnia (Multi)  Acquired lymphedema  Morbid obesity (Multi)    Patient seen and examined at bedside.  Overall medically stable, and overall much improved.  Discussed lymphedema therapy and highly advised patient and follow-up with this as it likely would help with her fluid status and overall level of functioning.  She is agreeable.  Continue optimize fluid status electrolytes and continue to wean down on O2.  Otherwise, continue supportive care.  No additional issues at this time.    Reviewed and approved by AMIRA GORDON on 1/9/25 at 9:46 PM.

## 2025-01-29 ENCOUNTER — NURSING HOME VISIT (OUTPATIENT)
Dept: POST ACUTE CARE | Facility: EXTERNAL LOCATION | Age: 55
End: 2025-01-29
Payer: MEDICARE

## 2025-01-29 DIAGNOSIS — I50.32 CHRONIC HEART FAILURE WITH PRESERVED EJECTION FRACTION: Primary | ICD-10-CM

## 2025-01-29 DIAGNOSIS — E66.01 MORBID OBESITY (MULTI): ICD-10-CM

## 2025-01-29 DIAGNOSIS — I89.0 ACQUIRED LYMPHEDEMA: ICD-10-CM

## 2025-01-29 DIAGNOSIS — R06.02 SHORTNESS OF BREATH: ICD-10-CM

## 2025-01-29 DIAGNOSIS — E66.2 OBESITY HYPOVENTILATION SYNDROME (MULTI): ICD-10-CM

## 2025-01-29 DIAGNOSIS — J96.22 ACUTE ON CHRONIC RESPIRATORY FAILURE WITH HYPERCAPNIA (MULTI): ICD-10-CM

## 2025-01-29 PROCEDURE — 99309 SBSQ NF CARE MODERATE MDM 30: CPT | Performed by: STUDENT IN AN ORGANIZED HEALTH CARE EDUCATION/TRAINING PROGRAM

## 2025-01-29 ASSESSMENT — ENCOUNTER SYMPTOMS
WEAKNESS: 1
MUSCULOSKELETAL NEGATIVE: 1
PSYCHIATRIC NEGATIVE: 1
GASTROINTESTINAL NEGATIVE: 1
SHORTNESS OF BREATH: 1
FATIGUE: 1

## 2025-01-29 NOTE — LETTER
Patient: Mel Araiza  : 1970    Encounter Date: 2025    Subjective  Patient ID: Mel Araiza is a 54 y.o. female.    Patient seen and examined on routine evaluation.  She regards that she is overall doing well, and is likely leaving the facility in the coming days.  She is very excited for this, as she feels like she is getting close to her baseline and has improved very well with physical therapy.  In addition, she regards that moving forward, she understands the importance of heart failure regimen.  She does have a close follow-up with her PCP moving forward.  States that her CPAP machine, likely is not benefiting her and potentially needs a additional sleep study.  Otherwise is tolerating her diuretics and overall no issues with her medications.  States she overall feels well otherwise.        Review of Systems   Constitutional:  Positive for fatigue.   HENT: Negative.     Respiratory:  Positive for shortness of breath.    Cardiovascular:  Positive for leg swelling.   Gastrointestinal: Negative.    Musculoskeletal: Negative.    Neurological:  Positive for weakness.   Psychiatric/Behavioral: Negative.         ObjectiveVitals Reviewed via facility EMR   Physical Exam  Constitutional:       General: She is not in acute distress.     Appearance: She is not ill-appearing.   Eyes:      Pupils: Pupils are equal, round, and reactive to light.   Cardiovascular:      Rate and Rhythm: Normal rate and regular rhythm.      Pulses: Normal pulses.      Heart sounds: No murmur heard.  Pulmonary:      Effort: No respiratory distress.      Breath sounds: No wheezing.   Abdominal:      General: Abdomen is flat. Bowel sounds are normal. There is no distension.   Musculoskeletal:      Right lower leg: Edema present.      Left lower leg: Edema present.      Comments: Cronic lymphademai   Skin:     General: Skin is warm and dry.   Neurological:      Mental Status: She is alert. Mental status is at baseline.      Cranial  Nerves: No cranial nerve deficit.      Motor: Weakness present.   Psychiatric:         Mood and Affect: Mood normal.         Behavior: Behavior normal.         Assessment/Plan  Diagnoses and all orders for this visit:  Chronic heart failure with preserved ejection fraction  Morbid obesity (Multi)  Acute on chronic respiratory failure with hypercapnia (Multi)  Obesity hypoventilation syndrome (Multi)  Shortness of breath  Acquired lymphedema    Patient seen and examined at bedside.  Overall medically stable and euvolemic at this time.  Again discussed importance of adherence with appropriate heart failure regimen such as low-salt diet fluid restriction leg elevation and closely monitor weights.  Encouraged daily activities, and in addition likely does need a repeat sleep study.  She is going to talk to her PCP about this on discharge.  She is also following up with the lymphedema clinic, however this will not be until March.  She will discuss this with her PCP about potentially moving this up.  Otherwise, we will continue as needed oxygen.  No additional issues at this time.  Medically stable for discharge, discussed care plan with staff.    Reviewed and approved by ANDREA GORDON on 1/29/25 at 8:09 PM.       Electronically Signed By: Andrea Gordon DO   1/29/25  8:09 PM

## 2025-01-30 NOTE — PROGRESS NOTES
Subjective   Patient ID: Mel Araiza is a 54 y.o. female.    Patient seen and examined on routine evaluation.  She regards that she is overall doing well, and is likely leaving the facility in the coming days.  She is very excited for this, as she feels like she is getting close to her baseline and has improved very well with physical therapy.  In addition, she regards that moving forward, she understands the importance of heart failure regimen.  She does have a close follow-up with her PCP moving forward.  States that her CPAP machine, likely is not benefiting her and potentially needs a additional sleep study.  Otherwise is tolerating her diuretics and overall no issues with her medications.  States she overall feels well otherwise.        Review of Systems   Constitutional:  Positive for fatigue.   HENT: Negative.     Respiratory:  Positive for shortness of breath.    Cardiovascular:  Positive for leg swelling.   Gastrointestinal: Negative.    Musculoskeletal: Negative.    Neurological:  Positive for weakness.   Psychiatric/Behavioral: Negative.         Objective Vitals Reviewed via facility EMR   Physical Exam  Constitutional:       General: She is not in acute distress.     Appearance: She is not ill-appearing.   Eyes:      Pupils: Pupils are equal, round, and reactive to light.   Cardiovascular:      Rate and Rhythm: Normal rate and regular rhythm.      Pulses: Normal pulses.      Heart sounds: No murmur heard.  Pulmonary:      Effort: No respiratory distress.      Breath sounds: No wheezing.   Abdominal:      General: Abdomen is flat. Bowel sounds are normal. There is no distension.   Musculoskeletal:      Right lower leg: Edema present.      Left lower leg: Edema present.      Comments: Cronic lymphademai   Skin:     General: Skin is warm and dry.   Neurological:      Mental Status: She is alert. Mental status is at baseline.      Cranial Nerves: No cranial nerve deficit.      Motor: Weakness present.    Psychiatric:         Mood and Affect: Mood normal.         Behavior: Behavior normal.         Assessment/Plan   Diagnoses and all orders for this visit:  Chronic heart failure with preserved ejection fraction  Morbid obesity (Multi)  Acute on chronic respiratory failure with hypercapnia (Multi)  Obesity hypoventilation syndrome (Multi)  Shortness of breath  Acquired lymphedema    Patient seen and examined at bedside.  Overall medically stable and euvolemic at this time.  Again discussed importance of adherence with appropriate heart failure regimen such as low-salt diet fluid restriction leg elevation and closely monitor weights.  Encouraged daily activities, and in addition likely does need a repeat sleep study.  She is going to talk to her PCP about this on discharge.  She is also following up with the lymphedema clinic, however this will not be until March.  She will discuss this with her PCP about potentially moving this up.  Otherwise, we will continue as needed oxygen.  No additional issues at this time.  Medically stable for discharge, discussed care plan with staff.    Reviewed and approved by AMIRA GORDON on 1/29/25 at 8:09 PM.

## 2025-03-03 ENCOUNTER — APPOINTMENT (OUTPATIENT)
Dept: OCCUPATIONAL THERAPY | Facility: CLINIC | Age: 55
End: 2025-03-03
Payer: COMMERCIAL

## 2025-03-12 ENCOUNTER — APPOINTMENT (OUTPATIENT)
Dept: OCCUPATIONAL THERAPY | Facility: CLINIC | Age: 55
End: 2025-03-12
Payer: COMMERCIAL

## 2025-03-19 ENCOUNTER — APPOINTMENT (OUTPATIENT)
Dept: OCCUPATIONAL THERAPY | Facility: CLINIC | Age: 55
End: 2025-03-19
Payer: COMMERCIAL

## 2025-03-26 ENCOUNTER — APPOINTMENT (OUTPATIENT)
Dept: OCCUPATIONAL THERAPY | Facility: CLINIC | Age: 55
End: 2025-03-26
Payer: COMMERCIAL

## 2025-04-29 ENCOUNTER — HOSPITAL ENCOUNTER (OUTPATIENT)
Dept: WOUND CARE | Age: 55
Discharge: HOME | End: 2025-04-29
Attending: STUDENT IN AN ORGANIZED HEALTH CARE EDUCATION/TRAINING PROGRAM
Payer: COMMERCIAL

## 2025-04-29 VITALS
RESPIRATION RATE: 20 BRPM | SYSTOLIC BLOOD PRESSURE: 124 MMHG | HEART RATE: 84 BPM | TEMPERATURE: 96.9 F | DIASTOLIC BLOOD PRESSURE: 53 MMHG

## 2025-04-29 DIAGNOSIS — I87.2 VENOUS INSUFFICIENCY OF BOTH LOWER EXTREMITIES: ICD-10-CM

## 2025-04-29 DIAGNOSIS — E66.01 OBESITY, MORBID (MORE THAN 100 LBS OVER IDEAL WEIGHT OR BMI > 40) (HCC): Primary | ICD-10-CM

## 2025-04-29 DIAGNOSIS — I89.0 CHRONIC ACQUIRED LYMPHEDEMA: ICD-10-CM

## 2025-04-29 PROCEDURE — 11042 DBRDMT SUBQ TIS 1ST 20SQCM/<: CPT

## 2025-04-29 NOTE — WOUND CARE
[facility] Physician Billing Sheet.     Ana Laura Gongora  AGE: 54 y.o.   GENDER: female  : 1970  TODAY'S DATE:  2025    ICD-10 CODES  I89.0  I87.2  L97.812         PHYSICIAN PROCEDURES    CPT CODE  04171  19191        Electronically signed by German Durant DPM on 2025 at 3:30 PM       
drainage       PLEASE NOTE: IF YOU ARE UNABLE TO OBTAIN WOUND SUPPLIES, CONTINUE TO USE THE SUPPLIES YOU HAVE AVAILABLE UNTIL YOU ARE ABLE TO REACH US. IT IS MOST IMPORTANT TO KEEP THE WOUND COVERED AT ALL TIMES           Electronically signed by Germna Durant DPM on 4/29/2025 at 3:29 PM      
none

## 2025-04-29 NOTE — FLOWSHEET NOTE
Wound Care Supplies      Supply Company:     [facility] [address] [place] [address]  p: [phone] f: [phone]    Ordering Center:     [facility]  [address] [facility]  [place] [address]  [phone]  WOUND CARE [phone]  FAX NUMBER [phone]    Patient Information:      Ana Laura Gongora  [address]  [place] [id number]   [phone]   : 1970  AGE: 54 y.o.     GENDER: female   TODAYS DATE:  2025    Insurance:      PRIMARY INSURANCE:  Plan: [facility] D[facility]  [facility]: [facility]  Effective Date: 10/1/2020  [id number] - (Medicare Managed)    SECONDARY INSURANCE:  Plan:   Coverage:   Effective Date:   @ProfindGROUPNUM@    [unfilled]   [unfilled]     Patient Wound Information:      Problem List Items Addressed This Visit          Other    Obesity, morbid (more than 100 lbs over ideal weight or BMI > 40) (HCC) - Primary    Chronic acquired lymphedema       WOUNDS REQUIRING DRESSING SUPPLIES:     Wound 24 Leg Left;Medial;Posterior #3 (Active)   Wound Image   25 1421   Wound Etiology Venous 25 1421   Wound Cleansed Cleansed with saline 25 1421   Dressing/Treatment Hydrofera blue;Dry dressing 25 1452   Wound Length (cm) 9 cm 25 1421   Wound Width (cm) 16 cm 25 1421   Wound Depth (cm) 0.2 cm 25 1421   Wound Surface Area (cm^2) 144 cm^2 25 1421   Change in Wound Size % (l*w) -135.29 25 1421   Wound Volume (cm^3) 28.8 cm^3 25 1421   Wound Healing % -371 25 1421   Post-Procedure Length (cm) 9 cm 25 1438   Post-Procedure Width (cm) 16 cm 25 1438   Post-Procedure Depth (cm) 0.2 cm 25 1438   Post-Procedure Surface Area (cm^2) 144 cm^2 25 1438   Post-Procedure Volume (cm^3) 28.8 cm^3 25 1438   Wound Assessment Pink/red;Slough 25 1421   Drainage Amount Large (50-75% saturated) 25 1421   Drainage Description

## 2025-04-29 NOTE — DISCHARGE INSTRUCTIONS
[facility] Wound Center and Hyperbaric Medicine   Physician Orders and Discharge Instructions  [facility]  [address] [place]  [address]  Telephone: [phone]      [phone]        NAME:  Ana Laura Gongora                                                                                           YOB: 1970  MEDICAL RECORD NUMBER:  [id number]     Your  is:  La     Home Care/Facility:      Wound Location:  Left Lateral Leg, left Medial Leg        Dressing order:   1.Cleanse wound(s) with normal saline.  2. Apply triamcinolone to jamee wound then dry HYDROFERA BLUE READY FOAM or equivalent to wound bed.  NOTE: If your HYDROFRERA BLUE is not soft and pliable, moisten with saline until it is sponge like and then ring out excess saline and apply to wound bed.  3. Cover with 4x4's and wrap with gauze (crispin or kerlix)  4. Change daily if able,if not, you can change every other day       Compression: Apply medium compression hose to  bilateral lower leg(s), may remove at bedtime, reapply first thing in the morning, avoid prolonged standing, elevate legs when sitting.(74)       Offloading Device:     Other Instructions:  miconazole powder and apply to folds in your leg.      Keep all dressings clean, dry and intact.  Keep pressure off the wound(s) at all times.      Follow up visit: 1 week May 6 , 2025 @  2:00pm     Please give 24 hour notice if unable to keep appointment. [phone]     If you experience any of the following, please call the Wound Care Service at  [phone] or go to the nearest emergency room.        *Increase in pain         *Temperature over 101           *Increase in drainage from your wound or a foul odor  *Uncontrolled swelling            *Need for compression bandage changes due to slippage, breakthrough drainage       PLEASE NOTE: IF YOU ARE UNABLE TO OBTAIN WOUND SUPPLIES, CONTINUE TO USE THE SUPPLIES YOU HAVE

## 2025-05-06 ENCOUNTER — HOSPITAL ENCOUNTER (OUTPATIENT)
Dept: WOUND CARE | Age: 55
Discharge: HOME | End: 2025-05-06
Attending: STUDENT IN AN ORGANIZED HEALTH CARE EDUCATION/TRAINING PROGRAM
Payer: COMMERCIAL

## 2025-05-06 VITALS
TEMPERATURE: 96.6 F | SYSTOLIC BLOOD PRESSURE: 121 MMHG | DIASTOLIC BLOOD PRESSURE: 76 MMHG | HEART RATE: 85 BPM | RESPIRATION RATE: 20 BRPM

## 2025-05-06 DIAGNOSIS — L97.221 VENOUS STASIS ULCER OF LEFT CALF LIMITED TO BREAKDOWN OF SKIN WITH VARICOSE VEINS (HCC): ICD-10-CM

## 2025-05-06 DIAGNOSIS — I83.022 VENOUS STASIS ULCER OF LEFT CALF LIMITED TO BREAKDOWN OF SKIN WITH VARICOSE VEINS (HCC): ICD-10-CM

## 2025-05-06 DIAGNOSIS — I89.0 CHRONIC ACQUIRED LYMPHEDEMA: Primary | ICD-10-CM

## 2025-05-06 DIAGNOSIS — E66.01 OBESITY, MORBID (MORE THAN 100 LBS OVER IDEAL WEIGHT OR BMI > 40) (HCC): ICD-10-CM

## 2025-05-06 PROCEDURE — 97597 DBRDMT OPN WND 1ST 20 CM/<: CPT

## 2025-05-06 RX ORDER — TRIAMCINOLONE ACETONIDE 1 MG/G
OINTMENT TOPICAL ONCE
Status: DISCONTINUED | OUTPATIENT
Start: 2025-05-06 | End: 2025-05-07 | Stop reason: HOSPADM

## 2025-05-06 NOTE — WOUND CARE
[facility] Physician Billing Sheet.     Ana Laura Gongora  AGE: 54 y.o.   GENDER: female  : 1970  TODAY'S DATE:  2025    ICD-10 CODES  I89.0  I87.2  L97.812         PHYSICIAN PROCEDURES    CPT CODE  64916        Electronically signed by German Durant DPM on 2025 at 2:31 PM

## 2025-05-06 NOTE — DISCHARGE INSTRUCTIONS
[facility] Wound Center and Hyperbaric Medicine   Physician Orders and Discharge Instructions  [facility]  [address] [place]  [address]  Telephone: [phone]      [phone]        NAME:  Ana Laura Gongora                                                                                           YOB: 1970  MEDICAL RECORD NUMBER:  [id number]     Your  is:  La     Home Care/Facility:      Wound Location:  Left Lateral Leg, left Medial Leg        Dressing order:   1.Cleanse wound(s) with normal saline. (Today in the wound center apply Triamcinolone to bottom of fissure area on left leg)  2. Apply triamcinolone to jamee wound then dry HYDROFERA BLUE READY FOAM or equivalent to wound bed.  NOTE: If your HYDROFRERA BLUE is not soft and pliable, moisten with saline until it is sponge like and then ring out excess saline and apply to wound bed.  3. Cover with 4x4's and wrap with gauze (crispin or kerlix)  4. Change daily if able,if not, you can change every other day        Compression: Apply medium compression hose to  bilateral lower leg(s), may remove at bedtime, reapply first thing in the morning, avoid prolonged standing, elevate legs when sitting.(62 right,68 left)        Offloading Device:     Other Instructions:  miconazole powder and apply to folds in your leg.      Keep all dressings clean, dry and intact.  Keep pressure off the wound(s) at all times.      Follow up visit: 2 weeks May 20 , 2025 @  2:00pm     Please give 24 hour notice if unable to keep appointment. [phone]     If you experience any of the following, please call the Wound Care Service at  [phone] or go to the nearest emergency room.        *Increase in pain         *Temperature over 101           *Increase in drainage from your wound or a foul odor  *Uncontrolled swelling            *Need for compression bandage changes due to slippage, breakthrough drainage

## 2025-05-06 NOTE — PLAN OF CARE
Problem: Wound:  Goal: Will show signs of wound healing; wound closure and no evidence of infection  Description: Will show signs of wound healing; wound closure and no evidence of infection  5/6/2025 1357 by Kimmy Wolfe RN  Outcome: Progressing  5/6/2025 1356 by La Hendrickson RN  Outcome: Progressing

## 2025-06-03 ENCOUNTER — HOSPITAL ENCOUNTER (OUTPATIENT)
Dept: WOUND CARE | Age: 55
Discharge: HOME | End: 2025-06-03
Attending: STUDENT IN AN ORGANIZED HEALTH CARE EDUCATION/TRAINING PROGRAM
Payer: COMMERCIAL

## 2025-06-03 VITALS
HEART RATE: 89 BPM | SYSTOLIC BLOOD PRESSURE: 109 MMHG | RESPIRATION RATE: 20 BRPM | DIASTOLIC BLOOD PRESSURE: 53 MMHG | TEMPERATURE: 96.6 F

## 2025-06-03 DIAGNOSIS — I83.022 VENOUS STASIS ULCER OF LEFT CALF LIMITED TO BREAKDOWN OF SKIN WITH VARICOSE VEINS (HCC): ICD-10-CM

## 2025-06-03 DIAGNOSIS — L97.221 VENOUS STASIS ULCER OF LEFT CALF LIMITED TO BREAKDOWN OF SKIN WITH VARICOSE VEINS (HCC): ICD-10-CM

## 2025-06-03 DIAGNOSIS — I89.0 CHRONIC ACQUIRED LYMPHEDEMA: Primary | ICD-10-CM

## 2025-06-03 DIAGNOSIS — E66.01 OBESITY, MORBID (MORE THAN 100 LBS OVER IDEAL WEIGHT OR BMI > 40) (HCC): ICD-10-CM

## 2025-06-03 PROCEDURE — 99213 OFFICE O/P EST LOW 20 MIN: CPT | Performed by: STUDENT IN AN ORGANIZED HEALTH CARE EDUCATION/TRAINING PROGRAM

## 2025-06-03 PROCEDURE — 99213 OFFICE O/P EST LOW 20 MIN: CPT

## 2025-06-03 RX ORDER — LIDOCAINE 40 MG/G
CREAM TOPICAL ONCE
Status: DISCONTINUED | OUTPATIENT
Start: 2025-06-03 | End: 2025-06-04 | Stop reason: HOSPADM

## 2025-06-03 NOTE — DISCHARGE INSTRUCTIONS
[facility] Wound Center and Hyperbaric Medicine   Physician Orders and Discharge Instructions  [facility]  [address] [place]  [address]  Telephone: [phone]      [phone]        NAME:  Ana Laura Gongora                                                                                           YOB: 1970  MEDICAL RECORD NUMBER:  [id number]     Your  is:  La     Home Care/Facility:      Wound Location:  Left Lateral Leg, left Medial Leg        Dressing order:   1.Cleanse wound(s) with normal saline.   2. Apply miconazole powder to skin folds and then pack a piece of guaze in the skin fold area  HYDROFERA BLUE READY FOAM or equivalent to wound bed.  NOTE: If your HYDROFRERA BLUE is not soft and pliable, moisten with saline until it is sponge like and then ring out excess saline and apply to wound bed.  3. Cover with 4x4's and wrap with gauze (crispin or kerlix)  4. Change daily if able,if not, you can change every other day        Compression: Apply medium compression hose to  bilateral lower leg(s), may remove at bedtime, reapply first thing in the morning, avoid prolonged standing, elevate legs when sitting.(70 right,75 left)        Offloading Device:     Other Instructions: Call lymphedema clinic to see what is covered and see if you are able to coordinate a ride to get to lymphedema appointments. If you are unable to do that,  for lymphedema pumps.      Keep all dressings clean, dry and intact.  Keep pressure off the wound(s) at all times.      Follow up visit: 2 weeks June 17 , 2025 @  2:00pm     Please give 24 hour notice if unable to keep appointment. [phone]     If you experience any of the following, please call the Wound Care Service at  [phone] or go to the nearest emergency room.        *Increase in pain         *Temperature over 101           *Increase in drainage from your wound or a foul

## 2025-06-03 NOTE — PLAN OF CARE
Wound Care Supplies      Supply Company:     [facility] [address] [facility] [address] [place] [address]  p: [phone] f: [phone]     Ordering Center:     [facility]  [address] [facility]  [place] [address]  [phone]  WOUND CARE [phone]  FAX NUMBER [phone]    Patient Information:      Ana Laura Gongora  [address]  [place] [id number]   [phone]   : 1970  AGE: 54 y.o.     GENDER: female   TODAYS DATE:  6/3/2025    Insurance:      PRIMARY INSURANCE:  Plan: [facility] D[facility]  Coverage: [facility]  Effective Date: 10/1/2020  [id number] - (Medicare Managed)    SECONDARY INSURANCE:  Plan:   Coverage:   Effective Date:   @InstantisGROUPNUM@    [unfilled]   [unfilled]     Patient Wound Information:      Problem List Items Addressed This Visit          Musculoskeletal and Integument    Venous stasis ulcer of left calf (HCC) (Chronic)    Relevant Medications    lidocaine (LMX) 4 % cream       Other    Obesity, morbid (more than 100 lbs over ideal weight or BMI > 40) (HCC)    Relevant Medications    lidocaine (LMX) 4 % cream    Chronic acquired lymphedema - Primary    Relevant Medications    lidocaine (LMX) 4 % cream       WOUNDS REQUIRING DRESSING SUPPLIES:     Wound 24 Leg Left;Medial;Posterior #3 (Active)   Wound Image   25 1520   Wound Etiology Venous 25 1520   Wound Cleansed Cleansed with saline 25 1520   Dressing/Treatment Hydrofera blue;Dry dressing 25 1613   Wound Length (cm) 16.9 cm 25 1520   Wound Width (cm) 22 cm 25 1520   Wound Depth (cm) 0.2 cm 25 1520   Wound Surface Area (cm^2) 371.8 cm^2 25 1520   Change in Wound Size % (l*w) -507.52 25 1520   Wound Volume (cm^3) 74.36 cm^3 25 1520   Wound Healing % -1115 25 1520   Post-Procedure Length (cm) 9 cm 25 1434   Post-Procedure Width (cm) 14.5 cm 25 1434   Post-Procedure Depth (cm) 0.2 cm

## 2025-06-03 NOTE — PROGRESS NOTES
[facility] Wound Care Center   Progress Note and Procedure Note      Ana Laura Gongora  MEDICAL RECORD NUMBER:  [id number]  AGE: 54 y.o.   GENDER: female  : 1970  EPISODE DATE:  6/3/2025    Subjective:     Chief Complaint   Patient presents with    Wound Check         HISTORY of PRESENT ILLNESS HPI     Ana Laura Gongora is a 54 y.o. female who presents today for wound/ulcer evaluation.   History of Wound Context: BL LE lymphedema, left worse than right. Chronic venous stasis  Wound/Ulcer Pain Timing/Severity: mild  Quality of pain: aching  Severity:  2 / 10   Modifying Factors: Pain worsens with walking  Associated Signs/Symptoms: edema and drainage    Ulcer Identification:  Ulcer Type: venous  Contributing Factors: venous stasis and lymphedema          PAST MEDICAL HISTORY        Diagnosis Date    Morbid obesity (HCC)     Overactive bladder     Psychiatric problem        PAST SURGICAL HISTORY    Past Surgical History:   Procedure Laterality Date    ABDOMEN SURGERY      gastric bypass surgery    BACK SURGERY      CHOLECYSTECTOMY         FAMILY HISTORY    History reviewed. No pertinent family history.    SOCIAL HISTORY    Social History     Tobacco Use    Smoking status: Never    Smokeless tobacco: Never   Vaping Use    Vaping status: Never Used   Substance Use Topics    Alcohol use: Yes     Comment: occasional    Drug use: No       ALLERGIES    Allergies   Allergen Reactions    Vancomycin Rash and Hives    Amoxicillin Hives, Itching and Rash    Cephalexin Itching, Rash and Hives       MEDICATIONS    Current Outpatient Medications on File Prior to Encounter   Medication Sig Dispense Refill    miconazole (ZEASORB-AF) 2 % powder Apply topically 2 times daily. 45 g 1    furosemide (LASIX) 40 MG tablet Take 1 tablet by mouth daily 60 tablet 3    traMADol (ULTRAM) 50 MG tablet Take 1 tablet by mouth every 8 hours as needed for Pain. Max Daily Amount: 150 mg      benzonatate (TESSALON) 100 MG capsule Take 1 capsule by mouth 3 times

## 2025-06-17 ENCOUNTER — HOSPITAL ENCOUNTER (OUTPATIENT)
Dept: WOUND CARE | Age: 55
Discharge: HOME | End: 2025-06-17
Attending: STUDENT IN AN ORGANIZED HEALTH CARE EDUCATION/TRAINING PROGRAM
Payer: COMMERCIAL

## 2025-06-17 VITALS
SYSTOLIC BLOOD PRESSURE: 89 MMHG | HEART RATE: 90 BPM | RESPIRATION RATE: 20 BRPM | DIASTOLIC BLOOD PRESSURE: 48 MMHG | TEMPERATURE: 97.6 F

## 2025-06-17 PROCEDURE — 99213 OFFICE O/P EST LOW 20 MIN: CPT

## 2025-06-17 ASSESSMENT — PAIN DESCRIPTION - DESCRIPTORS: DESCRIPTORS: DISCOMFORT;GNAWING

## 2025-06-17 ASSESSMENT — PAIN SCALES - GENERAL: PAINLEVEL_OUTOF10: 3

## 2025-06-17 ASSESSMENT — PAIN DESCRIPTION - ORIENTATION: ORIENTATION: RIGHT;LEFT

## 2025-06-17 ASSESSMENT — PAIN DESCRIPTION - LOCATION: LOCATION: LEG

## 2025-06-17 ASSESSMENT — PAIN DESCRIPTION - FREQUENCY: FREQUENCY: CONTINUOUS

## 2025-06-17 NOTE — DISCHARGE INSTRUCTIONS
[facility] Wound Center and Hyperbaric Medicine   Physician Orders and Discharge Instructions  [facility]  [address] [place]  [address]  Telephone: [phone]      [phone]        NAME:  Ana Laura Gongora                                                                                           YOB: 1970  MEDICAL RECORD NUMBER:  [id number]     Your  is:  La     Home Care/Facility:      Wound Location:  Left Lateral Leg, left Medial Leg        Dressing order:   1.Cleanse wound(s) with normal saline.   2. Apply miconazole powder to skin folds and leave that area open to air.    3. Apply HYDROFERA BLUE READY FOAM or equivalent to wound bed.  NOTE: If your HYDROFRERA BLUE is not soft and pliable, moisten with saline until it is sponge like and then ring out excess saline and apply to wound bed.  4. Cover with 4x4's and wrap with gauze (crispin or kerlix)  5. Change every other day         Compression: Apply medium compression hose to  bilateral lower leg(s), may remove at bedtime, reapply first thing in the morning, avoid prolonged standing, elevate legs when sitting.(69 right,71 left)        Offloading Device:     Other Instructions: Continue using lymphedema pumps if you get the ok from cardiologist.      Keep all dressings clean, dry and intact.  Keep pressure off the wound(s) at all times.      Follow up visit: 3 weeks July 8 , 2025 @  2:00 pm     Please give 24 hour notice if unable to keep appointment. [phone]     If you experience any of the following, please call the Wound Care Service at  [phone] or go to the nearest emergency room.        *Increase in pain         *Temperature over 101           *Increase in drainage from your wound or a foul odor  *Uncontrolled swelling            *Need for compression bandage changes due to slippage, breakthrough drainage       PLEASE NOTE: IF YOU ARE UNABLE TO OBTAIN WOUND SUPPLIES, CONTINUE

## 2025-06-17 NOTE — PLAN OF CARE
Wound Care Supplies      Supply Company:     [facility] [address] [facility] [address] [place] [address]  p: [phone] f: [phone]    Ordering Center:     [facility]  [address] [facility]  [place] [address]  [phone]  WOUND CARE [phone]  FAX NUMBER [phone]    Patient Information:      Ana Laura Gongora  [address]  [place] [id number]   [phone]   : 1970  AGE: 54 y.o.     GENDER: female   TODAYS DATE:  2025    Insurance:      PRIMARY INSURANCE:  [facility]  Coverage: [facility]  Effective Date: 10/1/2020  [id number] - (Medicare Managed)    SECONDARY INSURANCE:  Plan:   Coverage:   Effective Date:   @TapomatGROUPNUM@    [unfilled]   [unfilled]     Patient Wound Information:      Problem List Items Addressed This Visit    None      WOUNDS REQUIRING DRESSING SUPPLIES:     Wound 24 Leg Left;Medial;Posterior #3 (Active)   Wound Image   25 1426   Wound Etiology Venous 25 1426   Wound Cleansed Cleansed with saline 25 1426   Dressing/Treatment Hydrofera blue;Dry dressing 25 1450   Wound Length (cm) 10.5 cm 25 1426   Wound Width (cm) 14 cm 25 1426   Wound Depth (cm) 0.2 cm 25 1426   Wound Surface Area (cm^2) 147 cm^2 25 1426   Change in Wound Size % (l*w) -140.2 25 1426   Wound Volume (cm^3) 29.4 cm^3 25 1426   Wound Healing % -380 25 1426   Post-Procedure Length (cm) 9 cm 25 1434   Post-Procedure Width (cm) 14.5 cm 25 1434   Post-Procedure Depth (cm) 0.2 cm 25 1434   Post-Procedure Surface Area (cm^2) 130.5 cm^2 25 1434   Post-Procedure Volume (cm^3) 26.1 cm^3 25 1434   Wound Assessment Pink/red;Slough 25 1520   Drainage Amount Large (50-75% saturated) 25 1426   Drainage Description Serous;Yellow 25 1426   Odor None 25 1426   Radha-wound Assessment Blanchable erythema 25 1426   Margins

## 2025-06-17 NOTE — WOUND CARE
[facility] Physician Billing Sheet.     Ana Laura Gongora  AGE: 54 y.o.   GENDER: female  : 1970  TODAY'S DATE:  2025    ICD-10 CODES  I89.0  I87.2  L97.812         PHYSICIAN PROCEDURES    CPT CODE  32862        Electronically signed by German Durant DPM on 2025 at 2:47 PM

## 2025-07-03 ENCOUNTER — HOSPITAL ENCOUNTER (EMERGENCY)
Age: 55
Discharge: HOME | End: 2025-07-03
Attending: STUDENT IN AN ORGANIZED HEALTH CARE EDUCATION/TRAINING PROGRAM
Payer: COMMERCIAL

## 2025-07-03 VITALS
DIASTOLIC BLOOD PRESSURE: 57 MMHG | TEMPERATURE: 97.7 F | WEIGHT: 293 LBS | RESPIRATION RATE: 20 BRPM | BODY MASS INDEX: 50.02 KG/M2 | HEART RATE: 88 BPM | OXYGEN SATURATION: 99 % | SYSTOLIC BLOOD PRESSURE: 114 MMHG | HEIGHT: 64 IN

## 2025-07-03 DIAGNOSIS — L03.116 CELLULITIS OF LEFT LOWER EXTREMITY: Primary | ICD-10-CM

## 2025-07-03 LAB
ANION GAP SERPL CALCULATED.3IONS-SCNC: 12 MEQ/L (ref 9–15)
BASOPHILS # BLD: 0 K/UL (ref 0–0.2)
BASOPHILS NFR BLD: 0.3 %
BUN SERPL-MCNC: 14 MG/DL (ref 6–20)
CALCIUM SERPL-MCNC: 8.7 MG/DL (ref 8.5–9.9)
CHLORIDE SERPL-SCNC: 97 MEQ/L (ref 95–107)
CO2 SERPL-SCNC: 28 MEQ/L (ref 20–31)
CREAT SERPL-MCNC: 0.61 MG/DL (ref 0.5–0.9)
EOSINOPHIL # BLD: 0.1 K/UL (ref 0–0.7)
EOSINOPHIL NFR BLD: 0.7 %
ERYTHROCYTE [DISTWIDTH] IN BLOOD BY AUTOMATED COUNT: 14 % (ref 11.5–14.5)
GLUCOSE SERPL-MCNC: 106 MG/DL (ref 70–99)
HCT VFR BLD AUTO: 41.7 % (ref 37–47)
HGB BLD-MCNC: 13.2 G/DL (ref 12–16)
LYMPHOCYTES # BLD: 1.5 K/UL (ref 1–4.8)
LYMPHOCYTES NFR BLD: 16.4 %
MCH RBC QN AUTO: 26.7 PG (ref 27–31.3)
MCHC RBC AUTO-ENTMCNC: 31.7 % (ref 33–37)
MCV RBC AUTO: 84.4 FL (ref 79.4–94.8)
MONOCYTES # BLD: 0.8 K/UL (ref 0.2–0.8)
MONOCYTES NFR BLD: 8.6 %
NEUTROPHILS # BLD: 6.9 K/UL (ref 1.4–6.5)
NEUTS SEG NFR BLD: 73.8 %
PLATELET # BLD AUTO: 181 K/UL (ref 130–400)
POTASSIUM SERPL-SCNC: 3.6 MEQ/L (ref 3.4–4.9)
RBC # BLD AUTO: 4.94 M/UL (ref 4.2–5.4)
SODIUM SERPL-SCNC: 137 MEQ/L (ref 135–144)
WBC # BLD AUTO: 9.4 K/UL (ref 4.8–10.8)

## 2025-07-03 PROCEDURE — 85025 COMPLETE CBC W/AUTO DIFF WBC: CPT

## 2025-07-03 PROCEDURE — 6370000000 HC RX 637 (ALT 250 FOR IP): Performed by: STUDENT IN AN ORGANIZED HEALTH CARE EDUCATION/TRAINING PROGRAM

## 2025-07-03 PROCEDURE — 36415 COLL VENOUS BLD VENIPUNCTURE: CPT

## 2025-07-03 PROCEDURE — 80048 BASIC METABOLIC PNL TOTAL CA: CPT

## 2025-07-03 PROCEDURE — 99284 EMERGENCY DEPT VISIT MOD MDM: CPT

## 2025-07-03 RX ORDER — SULFAMETHOXAZOLE AND TRIMETHOPRIM 800; 160 MG/1; MG/1
1 TABLET ORAL 2 TIMES DAILY
Qty: 14 TABLET | Refills: 0 | Status: SHIPPED | OUTPATIENT
Start: 2025-07-03 | End: 2025-07-10

## 2025-07-03 RX ORDER — SULFAMETHOXAZOLE AND TRIMETHOPRIM 800; 160 MG/1; MG/1
1 TABLET ORAL
Status: COMPLETED | OUTPATIENT
Start: 2025-07-03 | End: 2025-07-03

## 2025-07-03 RX ADMIN — SULFAMETHOXAZOLE AND TRIMETHOPRIM 1 TABLET: 800; 160 TABLET ORAL at 19:26

## 2025-07-03 ASSESSMENT — PAIN DESCRIPTION - LOCATION: LOCATION: LEG

## 2025-07-03 ASSESSMENT — PAIN DESCRIPTION - FREQUENCY: FREQUENCY: CONTINUOUS

## 2025-07-03 ASSESSMENT — PAIN - FUNCTIONAL ASSESSMENT: PAIN_FUNCTIONAL_ASSESSMENT: WONG-BAKER FACES

## 2025-07-03 ASSESSMENT — PAIN DESCRIPTION - ORIENTATION: ORIENTATION: LEFT

## 2025-07-03 ASSESSMENT — PAIN DESCRIPTION - ONSET: ONSET: ON-GOING

## 2025-07-03 ASSESSMENT — PAIN DESCRIPTION - PAIN TYPE: TYPE: ACUTE PAIN;CHRONIC PAIN

## 2025-07-03 NOTE — ED PROVIDER NOTES
MEDICAL SCREENING EXAM     Basic Information   Time Seen: 5:16 PM   Primary Care Provider: Alexa Emerson DO     Chief Complaint   Patient presents with    Wound Check     Acute on chronic, seen at wound center Dr Durant, Pt reports home health helper was on vacation and wounds were not cleaned or dressed, worsening over 4 days, out of supplies      HPI   Ana Laura Gongora is a 54 yrs female who presents for wound check.  Patient states that she has chronic wounds to her left lower extremity.  She has lymphedema.  She sees wound care.  Saw them recently.  However her home health aide has been out of town and patient states that her wounds have gotten worse over the past 4 days.  She states they typically have clear discharge but the discharge has been more mucousy.  Patient also feels that she may have surrounding cellulitis.  States she has had cellulitis in the past and her skin changes look consistent with prior cellulitis episodes.  Patient denies fever, chills, nausea or vomiting, lethargy or fatigue.     Physical Exam     BP (!) 114/57 (07/03/25 1655)    Temp 97.7 °F (36.5 °C) (07/03/25 1651)    Pulse 88 (07/03/25 1651)   Resp 20 (07/03/25 1651)    SpO2 99 % (07/03/25 1651)       General: Awake and Alert, no acute distress   CV: RRR, S1, S2   Resp: LCTAB, even and non labored   Other: Distal left lower extremity is wrapped in triage, will defer assessment   Impression and Plan     Labs Reviewed   CBC WITH AUTO DIFFERENTIAL   BASIC METABOLIC PANEL        No orders to display      Final Impression   I have performed a medical screening exam on Ana Laura Gongora. Based on this patient's chief complaint/symptoms of   Chief Complaint   Patient presents with    Wound Check     Acute on chronic, seen at wound center Dr Durant, Pt reports home health helper was on vacation and wounds were not cleaned or dressed, worsening over 4 days, out of supplies    and my focused exam, their care will be started and

## 2025-07-03 NOTE — DISCHARGE INSTRUCTIONS
You are seen in the ER today due to concern for left lower extremity drainage.  It does appear that you have a cellulitis.  Unfortunately, there was no drainage to be collected reliably as a sample.  I will start you on Bactrim for the next 7 days.  In the meantime, your white blood cell count is not elevated and you are not anemic which is reassuring.  You were also afebrile.  Your electrolytes and kidney function are normal.  Please continue to follow-up with your wound care physician on Wednesday, 7/9.  Please return if the redness to your left lower extremity continues to increase, he develop fevers and chills and worsening weakness and fatigue.  We also did send you home with wound care supplies.   no

## 2025-07-03 NOTE — ED TRIAGE NOTES
Pt to ed from home via triage with c/o pain to left lower leg  Pt reports that she is being treated at wound center for this wound, pt reports that she had appt 2 weeks ago and has upcoming follow up next week  Pt states that her home \"helper\" has been on vacation and she was unable to clean or dress wounds. PT reports that wound drainage has changed from \"clear to slimy and green\". Dressing in place, CDI  Pt respirations even and unlabored, on 2 L home 02  Pt calm and cooperative, alert and oriented.   No s/s of acute distress noted.

## 2025-07-04 ASSESSMENT — ENCOUNTER SYMPTOMS
COUGH: 0
ABDOMINAL PAIN: 0
WHEEZING: 0
RHINORRHEA: 0
EYE REDNESS: 0
COLOR CHANGE: 0
EYE DISCHARGE: 0
PHOTOPHOBIA: 0
NAUSEA: 0
CHEST TIGHTNESS: 0
EYE PAIN: 0
EYE ITCHING: 0
VOMITING: 0

## 2025-07-08 ENCOUNTER — HOSPITAL ENCOUNTER (OUTPATIENT)
Dept: WOUND CARE | Age: 55
Discharge: HOME | End: 2025-07-08
Attending: STUDENT IN AN ORGANIZED HEALTH CARE EDUCATION/TRAINING PROGRAM
Payer: COMMERCIAL

## 2025-07-08 VITALS
HEART RATE: 82 BPM | TEMPERATURE: 97.6 F | RESPIRATION RATE: 20 BRPM | DIASTOLIC BLOOD PRESSURE: 59 MMHG | SYSTOLIC BLOOD PRESSURE: 109 MMHG

## 2025-07-08 DIAGNOSIS — E66.01 OBESITY, MORBID (MORE THAN 100 LBS OVER IDEAL WEIGHT OR BMI > 40) (HCC): ICD-10-CM

## 2025-07-08 DIAGNOSIS — I83.022 VENOUS STASIS ULCER OF LEFT CALF LIMITED TO BREAKDOWN OF SKIN WITH VARICOSE VEINS (HCC): ICD-10-CM

## 2025-07-08 DIAGNOSIS — L97.221 VENOUS STASIS ULCER OF LEFT CALF LIMITED TO BREAKDOWN OF SKIN WITH VARICOSE VEINS (HCC): ICD-10-CM

## 2025-07-08 DIAGNOSIS — I89.0 CHRONIC ACQUIRED LYMPHEDEMA: Primary | ICD-10-CM

## 2025-07-08 DIAGNOSIS — I87.2 VENOUS INSUFFICIENCY OF BOTH LOWER EXTREMITIES: ICD-10-CM

## 2025-07-08 PROCEDURE — 11042 DBRDMT SUBQ TIS 1ST 20SQCM/<: CPT

## 2025-07-08 PROCEDURE — 6370000000 HC RX 637 (ALT 250 FOR IP): Performed by: STUDENT IN AN ORGANIZED HEALTH CARE EDUCATION/TRAINING PROGRAM

## 2025-07-08 RX ORDER — DOXYCYCLINE HYCLATE 100 MG
100 TABLET ORAL 2 TIMES DAILY
Qty: 14 TABLET | Refills: 0 | Status: SHIPPED | OUTPATIENT
Start: 2025-07-08 | End: 2025-07-15

## 2025-07-08 RX ORDER — LIDOCAINE 40 MG/G
CREAM TOPICAL ONCE
Status: COMPLETED | OUTPATIENT
Start: 2025-07-08 | End: 2025-07-08

## 2025-07-08 RX ADMIN — LIDOCAINE 4%: 4 CREAM TOPICAL at 14:18

## 2025-07-08 ASSESSMENT — PAIN DESCRIPTION - ORIENTATION: ORIENTATION: LEFT

## 2025-07-08 ASSESSMENT — PAIN DESCRIPTION - LOCATION: LOCATION: LEG

## 2025-07-08 ASSESSMENT — PAIN DESCRIPTION - PAIN TYPE: TYPE: ACUTE PAIN

## 2025-07-08 ASSESSMENT — PAIN SCALES - GENERAL: PAINLEVEL_OUTOF10: 3

## 2025-07-08 ASSESSMENT — PAIN DESCRIPTION - DESCRIPTORS: DESCRIPTORS: DISCOMFORT;ACHING

## 2025-07-08 ASSESSMENT — PAIN DESCRIPTION - FREQUENCY: FREQUENCY: CONTINUOUS

## 2025-07-08 NOTE — DISCHARGE INSTRUCTIONS
AVAILABLE UNTIL YOU ARE ABLE TO REACH US. IT IS MOST IMPORTANT TO KEEP THE WOUND COVERED AT ALL TIMES

## 2025-07-08 NOTE — PLAN OF CARE
Problem: Wound:  Goal: Will show signs of wound healing; wound closure and no evidence of infection  Description: Will show signs of wound healing; wound closure and no evidence of infection  Outcome: Progressing     
  Post-Procedure Volume (cm^3) 59.8 cm^3 07/08/25 1430   Wound Assessment Pink/red;Slough 07/08/25 1419   Drainage Amount Copious (>75 % saturated) 07/08/25 1419   Drainage Description Serous 07/08/25 1419   Odor None 07/08/25 1419   Radha-wound Assessment Blanchable erythema 07/08/25 1419   Margins Undefined edges 07/08/25 1419   Wound Thickness Description not for Pressure Injury Full thickness 07/08/25 1419   Number of days: 314       Wound 08/27/24 Leg Left;Lateral #4 (Active)   Wound Image   07/08/25 1419   Wound Etiology Venous 07/08/25 1419   Wound Cleansed Cleansed with saline 07/08/25 1419   Dressing/Treatment Hydrofera blue;Dry dressing 06/17/25 1450   Wound Length (cm) 5 cm 07/08/25 1419   Wound Width (cm) 4 cm 07/08/25 1419   Wound Depth (cm) 0.1 cm 07/08/25 1419   Wound Surface Area (cm^2) 20 cm^2 07/08/25 1419   Change in Wound Size % (l*w) -284.62 07/08/25 1419   Wound Volume (cm^3) 2 cm^3 07/08/25 1419   Wound Healing % -285 07/08/25 1419   Post-Procedure Length (cm) 3.5 cm 05/06/25 1434   Post-Procedure Width (cm) 2.2 cm 05/06/25 1434   Post-Procedure Depth (cm) 0.1 cm 05/06/25 1434   Post-Procedure Surface Area (cm^2) 7.7 cm^2 05/06/25 1434   Post-Procedure Volume (cm^3) 0.77 cm^3 05/06/25 1434   Wound Assessment Pink/red;Granulation tissue 07/08/25 1419   Drainage Amount Large (50-75% saturated) 07/08/25 1419   Drainage Description Serous;Yellow 07/08/25 1419   Odor None 07/08/25 1419   Radha-wound Assessment Blanchable erythema 07/08/25 1419   Margins Undefined edges 07/08/25 1419   Wound Thickness Description not for Pressure Injury Full thickness 07/08/25 1419   Number of days: 314       Wound 11/06/24 Tibial Distal;Left;Posterior (Active)   Number of days: 244       Wound 11/06/24 Pretibial Left;Proximal (Active)   Number of days: 244       Wound 11/06/24 Pretibial Distal;Left (Active)   Number of days: 244          Supplies Requested :      WOUND #: 1 and 2    PRIMARY DRESSING:  Other: Zetuvit

## 2025-07-08 NOTE — WOUND CARE
needed for Pain. Max Daily Amount: 150 mg      benzonatate (TESSALON) 100 MG capsule Take 1 capsule by mouth 3 times daily as needed for Cough      albuterol sulfate HFA (VENTOLIN HFA) 108 (90 Base) MCG/ACT inhaler Inhale 2 puffs into the lungs every 6 hours as needed for Wheezing      levothyroxine (SYNTHROID) 50 MCG tablet Take 1 tablet by mouth Daily      vitamin B-12 (CYANOCOBALAMIN) 500 MCG tablet Take 1 tablet by mouth daily      Cholecalciferol (VITAMIN D3) 50 MCG (2000 UT) CAPS Take 1 capsule by mouth daily      Multiple Vitamins-Minerals (THERAPEUTIC MULTIVITAMIN-MINERALS) tablet Take 1 tablet by mouth daily      ferrous sulfate 325 (65 Fe) MG tablet Take 325 mg by mouth daily (with breakfast)      vitamin C (ASCORBIC ACID) 500 MG tablet Take 1 tablet by mouth daily      diphenhydrAMINE (BENADRYL) 25 MG capsule Take 25 mg by mouth every 6 hours as needed      acetaminophen (TYLENOL) 500 MG tablet Take 2 tablets by mouth 3 times daily      buPROPion (WELLBUTRIN XL) 300 MG extended release tablet Take 1 tablet by mouth every morning       Current Facility-Administered Medications on File Prior to Encounter   Medication Dose Route Frequency Provider Last Rate Last Admin    miconazole (MICOTIN) 2 % powder   Topical BID German Durant DPM           REVIEW OF SYSTEMS    Pertinent items are noted in HPI.    Objective:      BP (!) 109/59   Pulse 82   Temp 97.6 °F (36.4 °C) (Temporal)   Resp 20     Wt Readings from Last 3 Encounters:   07/03/25 (!) 217.9 kg (480 lb 6.4 oz)   11/10/24 (!) 269.4 kg (594 lb)   11/05/24 (!) 254 kg (560 lb)       PHYSICAL EXAM  General Appearance: alert and oriented to person, place and time, well-developed and well-nourished, in no acute distress  Cardiovascular: normal rate, normal S1 and S2, no gallops, intact distal pulses, and no carotid bruits  Extremities: no cyanosis and no clubbing  Musculoskeletal: normal range of motion, no joint swelling, deformity or

## 2025-07-22 ENCOUNTER — HOSPITAL ENCOUNTER (OUTPATIENT)
Dept: WOUND CARE | Age: 55
Discharge: HOME | End: 2025-07-22
Attending: STUDENT IN AN ORGANIZED HEALTH CARE EDUCATION/TRAINING PROGRAM
Payer: COMMERCIAL

## 2025-07-22 VITALS
RESPIRATION RATE: 20 BRPM | TEMPERATURE: 96.6 F | SYSTOLIC BLOOD PRESSURE: 142 MMHG | HEART RATE: 85 BPM | DIASTOLIC BLOOD PRESSURE: 42 MMHG

## 2025-07-22 PROCEDURE — 97597 DBRDMT OPN WND 1ST 20 CM/<: CPT

## 2025-07-22 RX ORDER — TRIAMCINOLONE ACETONIDE 0.25 MG/G
OINTMENT TOPICAL
Qty: 1 EACH | Refills: 2 | Status: SHIPPED | OUTPATIENT
Start: 2025-07-22 | End: 2025-07-29

## 2025-07-22 ASSESSMENT — PAIN DESCRIPTION - ORIENTATION: ORIENTATION: LEFT

## 2025-07-22 ASSESSMENT — PAIN SCALES - GENERAL: PAINLEVEL_OUTOF10: 3

## 2025-07-22 ASSESSMENT — PAIN DESCRIPTION - LOCATION: LOCATION: LEG

## 2025-07-22 NOTE — DISCHARGE INSTRUCTIONS
[facility]   Physician Orders and Discharge Instructions  [facility]  [address] [place]  [address]  Telephone: [phone]      [phone]        NAME:  Ana Laura Gongora                                                                                           YOB: 1970  MEDICAL RECORD NUMBER:  [id number]     Your  is:  La     Home Care/Facility: [facility]      Wound Location:  Left Lateral Leg, left Medial Leg        Dressing order:   1.Cleanse wound(s) with normal saline or antibacterial soap.  Apply triamcinolone to the jamee wound with every other dressing change  2. Apply miconazole powder to skin fold by the ankle    3. Apply Zetuvit to wound bed.  4. Wrap with gauze (crispin or kerlix)  5. Change Daily       Compression: Apply ace wrap from the base of the toes to below the knee for compression         Offloading Device:     Other Instructions:  Triamcinolone from the pharmacy. Increase your lasix to 60mg for the next 3 days since your primary care physician has already approved this does as needed.      Keep all dressings clean, dry and intact.  Keep pressure off the wound(s) at all times.      Follow up visit: 1 week July 29, 2025 @ 2:30 pm      Please give 24 hour notice if unable to keep appointment. [phone]     If you experience any of the following, please call the Wound Care Service at  [phone] or go to the nearest emergency room.        *Increase in pain         *Temperature over 101           *Increase in drainage from your wound or a foul odor  *Uncontrolled swelling            *Need for compression bandage changes due to slippage, breakthrough drainage       PLEASE NOTE: IF YOU ARE UNABLE TO OBTAIN WOUND SUPPLIES, CONTINUE TO USE THE SUPPLIES YOU HAVE AVAILABLE UNTIL YOU ARE ABLE TO REACH US. IT IS MOST IMPORTANT TO KEEP THE WOUND COVERED AT ALL TIMES

## 2025-07-29 ENCOUNTER — HOSPITAL ENCOUNTER (OUTPATIENT)
Dept: WOUND CARE | Age: 55
Discharge: HOME | End: 2025-07-29
Attending: STUDENT IN AN ORGANIZED HEALTH CARE EDUCATION/TRAINING PROGRAM
Payer: COMMERCIAL

## 2025-07-29 VITALS
SYSTOLIC BLOOD PRESSURE: 131 MMHG | HEART RATE: 81 BPM | DIASTOLIC BLOOD PRESSURE: 61 MMHG | RESPIRATION RATE: 18 BRPM | TEMPERATURE: 98.5 F

## 2025-07-29 PROCEDURE — 11042 DBRDMT SUBQ TIS 1ST 20SQCM/<: CPT

## 2025-07-29 RX ORDER — TRIAMCINOLONE ACETONIDE 0.25 MG/G
OINTMENT TOPICAL
Qty: 80 G | Refills: 1 | Status: SHIPPED | OUTPATIENT
Start: 2025-07-29 | End: 2025-08-05

## 2025-07-29 ASSESSMENT — PAIN SCALES - GENERAL: PAINLEVEL_OUTOF10: 3

## 2025-07-29 ASSESSMENT — PAIN DESCRIPTION - LOCATION: LOCATION: LEG

## 2025-07-29 ASSESSMENT — PAIN DESCRIPTION - ORIENTATION: ORIENTATION: LEFT

## 2025-07-29 NOTE — PLAN OF CARE
Wound Care Supplies      Supply Company:     [employer] [id number] [place] [address] p: [phone] f: [phone]    Ordering Center:     [facility]  [address]  [place] [address]  [phone]  WOUND CARE [phone]  FAX NUMBER [phone]    Patient Information:      Ana Laura Gongora  [address]  [place] [address]   [phone]   : 1970  AGE: 55 y.o.     GENDER: female   TODAYS DATE:  2025    Insurance:      PRIMARY INSURANCE:  Plan: [facility]  Coverage: [facility]  Effective Date: 10/1/2020  [id number] - (Medicare Managed)    SECONDARY INSURANCE:  Plan:   Coverage:   Effective Date:   @[employer]@    [unfilled]   [unfilled]     Patient Wound Information:      Problem List Items Addressed This Visit    None      WOUNDS REQUIRING DRESSING SUPPLIES:     Wound 24 Leg Left;Medial;Posterior #3 (Active)   Wound Image   25 1458   Wound Etiology Venous 25 1448   Wound Cleansed Cleansed with saline 25 1448   Dressing/Treatment Hydrofera blue;Dry dressing 25 1450   Wound Length (cm) 16 cm 25 1448   Wound Width (cm) 23 cm 25 1448   Wound Depth (cm) 0.1 cm 25 1448   Wound Surface Area (cm^2) 368 cm^2 25 1448   Change in Wound Size % (l*w) -501.31 25 1448   Wound Volume (cm^3) 36.8 cm^3 25 1448   Wound Healing % -501 25 1448   Post-Procedure Length (cm) 13 cm 25 1430   Post-Procedure Width (cm) 23 cm 25 1430   Post-Procedure Depth (cm) 0.2 cm 25 1430   Post-Procedure Surface Area (cm^2) 299 cm^2 25 1430   Post-Procedure Volume (cm^3) 59.8 cm^3 25 1430   Wound Assessment Pink/red;Slough 25 1448   Drainage Amount Copious (>75 % saturated) 25 1448   Drainage Description Serous 25 1448   Odor Mild 25 1448   Radha-wound Assessment Excoriated 25 1448   Margins Undefined edges 25 1448   Wound Thickness

## 2025-07-29 NOTE — DISCHARGE INSTRUCTIONS
[facility]   Physician Orders and Discharge Instructions  [facility]  [address] [place]  [address]  Telephone: [phone]      [phone]        NAME:  Ana Laura Gongora                                                                                           YOB: 1970  MEDICAL RECORD NUMBER:  [id number]     Your  is:  La     Home Care/Facility: [facility]      Wound Location:  Left Lateral Leg, left Medial Leg        Dressing order:   1.Cleanse wound(s) with normal saline or antibacterial soap.  Apply triamcinolone to the jamee wound   2. Apply miconazole powder to skin fold by the ankle    3. Apply Zetuvit to wound bed.  4. Wrap with gauze (crispin or kerlix)  5. Change Daily        Compression: Apply medium compression hose to  bilateral lower leg(s), and then apply ace wraps. May remove at bedtime, reapply first thing in the morning, avoid prolonged standing, elevate legs when sitting.        Offloading Device:     Other Instructions:  refill of Triamcinolone from the pharmacy and use as prescribed.      Keep all dressings clean, dry and intact.  Keep pressure off the wound(s) at all times.      Follow up visit: 2 weeks August 12, 2025 @      Please give 24 hour notice if unable to keep appointment. [phone]     If you experience any of the following, please call the Wound Care Service at  [phone] or go to the nearest emergency room.        *Increase in pain         *Temperature over 101           *Increase in drainage from your wound or a foul odor  *Uncontrolled swelling            *Need for compression bandage changes due to slippage, breakthrough drainage       PLEASE NOTE: IF YOU ARE UNABLE TO OBTAIN WOUND SUPPLIES, CONTINUE TO USE THE SUPPLIES YOU HAVE AVAILABLE UNTIL YOU ARE ABLE TO REACH US. IT IS MOST IMPORTANT TO KEEP THE WOUND COVERED AT ALL TIMES

## 2025-07-29 NOTE — WOUND CARE
[facility]   Progress Note and Procedure Note      Ana Laura Gongora  MEDICAL RECORD NUMBER:  [id number]  AGE: 55 y.o.   GENDER: female  : 1970  EPISODE DATE:  2025    Subjective:     Chief Complaint   Patient presents with    Wound Check         HISTORY of PRESENT ILLNESS HPI     Ana Laura Gongora is a 55 y.o. female who presents today for wound/ulcer evaluation.   History of Wound Context:     Chronic VLU bilateral LE with lymphedema     Wound/Ulcer Pain Timing/Severity: intermittent  Quality of pain: aching  Severity:  1 / 10   Modifying Factors: Pain worsens with walking  Associated Signs/Symptoms: none    Ulcer Identification:  Ulcer Type: venous  Contributing Factors: edema, venous stasis, and lymphedema    Wound: N/A        PAST MEDICAL HISTORY        Diagnosis Date    Morbid obesity (HCC)     Overactive bladder     Psychiatric problem        PAST SURGICAL HISTORY    Past Surgical History:   Procedure Laterality Date    ABDOMEN SURGERY      gastric bypass surgery    BACK SURGERY      CHOLECYSTECTOMY         FAMILY HISTORY    History reviewed. No pertinent family history.    SOCIAL HISTORY    Social History     Tobacco Use    Smoking status: Never    Smokeless tobacco: Never   Vaping Use    Vaping status: Never Used   Substance Use Topics    Alcohol use: Yes     Comment: occasional    Drug use: No       ALLERGIES    Allergies   Allergen Reactions    Vancomycin Rash and Hives    Amoxicillin Hives, Itching and Rash    Cephalexin Itching, Rash and Hives       MEDICATIONS    Current Outpatient Medications on File Prior to Encounter   Medication Sig Dispense Refill    triamcinolone (KENALOG) 0.025 % ointment Apply topically every other day to wound border 1 each 2    miconazole (ZEASORB-AF) 2 % powder Apply topically 2 times daily. 45 g 1    furosemide (LASIX) 40 MG tablet Take 1 tablet by mouth daily 60 tablet 3    traMADol (ULTRAM) 50 MG tablet Take 1 tablet by mouth every 8 hours as needed 
Description not for Pressure Injury Full thickness 07/08/25 1419   Number of days: 315       Wound 08/27/24 Leg Left;Lateral #4 (Active)   Wound Image   07/08/25 1419   Wound Etiology Venous 07/08/25 1419   Wound Cleansed Cleansed with saline 07/08/25 1419   Dressing/Treatment Hydrofera blue;Dry dressing 06/17/25 1450   Wound Length (cm) 5 cm 07/08/25 1419   Wound Width (cm) 4 cm 07/08/25 1419   Wound Depth (cm) 0.1 cm 07/08/25 1419   Wound Surface Area (cm^2) 20 cm^2 07/08/25 1419   Change in Wound Size % (l*w) -284.62 07/08/25 1419   Wound Volume (cm^3) 2 cm^3 07/08/25 1419   Wound Healing % -285 07/08/25 1419   Post-Procedure Length (cm) 3.5 cm 05/06/25 1434   Post-Procedure Width (cm) 2.2 cm 05/06/25 1434   Post-Procedure Depth (cm) 0.1 cm 05/06/25 1434   Post-Procedure Surface Area (cm^2) 7.7 cm^2 05/06/25 1434   Post-Procedure Volume (cm^3) 0.77 cm^3 05/06/25 1434   Wound Assessment Pink/red;Granulation tissue 07/08/25 1419   Drainage Amount Large (50-75% saturated) 07/08/25 1419   Drainage Description Serous;Yellow 07/08/25 1419   Odor None 07/08/25 1419   Radha-wound Assessment Blanchable erythema 07/08/25 1419   Margins Undefined edges 07/08/25 1419   Wound Thickness Description not for Pressure Injury Full thickness 07/08/25 1419   Number of days: 315       Wound 11/06/24 Tibial Distal;Left;Posterior (Active)   Number of days: 244       Wound 11/06/24 Pretibial Left;Proximal (Active)   Number of days: 244       Wound 11/06/24 Pretibial Distal;Left (Active)   Number of days: 244             Percent of Wound/Ulcer Debrided: 100%    Total Surface Area Debrided:  25 sq cm     Diabetic/Pressure/Non Pressure Ulcers:  Ulcer: Non-Pressure ulcer, fat layer exposed      Bleeding:  Minimal    Hemostasis Achieved:  by pressure    Procedural Pain:  2  / 10     Post Procedural Pain:  2 / 10     Response to treatment:  Well tolerated by patient.       Plan:     Patient seen and evaluated plan to increase absorptive

## 2025-08-12 ENCOUNTER — HOSPITAL ENCOUNTER (OUTPATIENT)
Dept: WOUND CARE | Age: 55
Discharge: HOME | End: 2025-08-12
Attending: STUDENT IN AN ORGANIZED HEALTH CARE EDUCATION/TRAINING PROGRAM
Payer: COMMERCIAL

## 2025-08-12 VITALS
RESPIRATION RATE: 20 BRPM | DIASTOLIC BLOOD PRESSURE: 60 MMHG | HEART RATE: 78 BPM | SYSTOLIC BLOOD PRESSURE: 121 MMHG | TEMPERATURE: 96.6 F

## 2025-08-12 PROCEDURE — 99213 OFFICE O/P EST LOW 20 MIN: CPT

## 2025-08-12 RX ORDER — SODIUM HYPOCHLORITE 1.25 MG/ML
SOLUTION TOPICAL DAILY
Qty: 500 ML | Refills: 0 | Status: SHIPPED | OUTPATIENT
Start: 2025-08-12

## 2025-08-12 ASSESSMENT — PAIN DESCRIPTION - LOCATION: LOCATION: LEG

## 2025-08-12 ASSESSMENT — PAIN DESCRIPTION - ORIENTATION: ORIENTATION: LOWER

## 2025-08-12 ASSESSMENT — PAIN SCALES - GENERAL: PAINLEVEL_OUTOF10: 3

## 2025-08-26 ENCOUNTER — HOSPITAL ENCOUNTER (OUTPATIENT)
Dept: WOUND CARE | Age: 55
Discharge: HOME | End: 2025-08-26
Attending: STUDENT IN AN ORGANIZED HEALTH CARE EDUCATION/TRAINING PROGRAM
Payer: COMMERCIAL

## 2025-08-26 VITALS — HEART RATE: 75 BPM | DIASTOLIC BLOOD PRESSURE: 66 MMHG | SYSTOLIC BLOOD PRESSURE: 132 MMHG | TEMPERATURE: 97.6 F

## 2025-08-26 PROCEDURE — 99213 OFFICE O/P EST LOW 20 MIN: CPT

## 2025-08-28 ENCOUNTER — OFFICE VISIT (OUTPATIENT)
Age: 55
End: 2025-08-28
Payer: COMMERCIAL

## 2025-08-28 VITALS
DIASTOLIC BLOOD PRESSURE: 60 MMHG | HEIGHT: 66 IN | BODY MASS INDEX: 47.09 KG/M2 | HEART RATE: 82 BPM | SYSTOLIC BLOOD PRESSURE: 120 MMHG | OXYGEN SATURATION: 95 % | WEIGHT: 293 LBS

## 2025-08-28 DIAGNOSIS — E66.01 MORBID OBESITY (HCC): ICD-10-CM

## 2025-08-28 DIAGNOSIS — E66.01 MORBID OBESITY (HCC): Primary | ICD-10-CM

## 2025-08-28 LAB
ALBUMIN SERPL-MCNC: 4 G/DL (ref 3.5–4.6)
AMPHET UR QL SCN: ABNORMAL
BARBITURATES UR QL SCN: ABNORMAL
BENZODIAZ UR QL SCN: POSITIVE
CANNABINOIDS UR QL SCN: ABNORMAL
COCAINE UR QL SCN: ABNORMAL
DRUG SCREEN COMMENT UR-IMP: ABNORMAL
ERYTHROCYTE [DISTWIDTH] IN BLOOD BY AUTOMATED COUNT: 14.2 % (ref 11.5–14.5)
FENTANYL SCREEN, URINE: ABNORMAL
HCT VFR BLD AUTO: 45.9 % (ref 37–47)
HGB BLD-MCNC: 14.6 G/DL (ref 12–16)
MCH RBC QN AUTO: 27 PG (ref 27–31.3)
MCHC RBC AUTO-ENTMCNC: 31.8 % (ref 33–37)
MCV RBC AUTO: 85 FL (ref 79.4–94.8)
METHADONE UR QL SCN: ABNORMAL
OPIATES UR QL SCN: ABNORMAL
OXYCODONE UR QL SCN: ABNORMAL
PCP UR QL SCN: ABNORMAL
PLATELET # BLD AUTO: 164 K/UL (ref 130–400)
PROPOXYPH UR QL SCN: ABNORMAL
PTH-INTACT SERPL-MCNC: 47.7 PG/ML (ref 15–65)
RBC # BLD AUTO: 5.4 M/UL (ref 4.2–5.4)
TSH REFLEX: 2.61 UIU/ML (ref 0.44–3.86)
WBC # BLD AUTO: 6.8 K/UL (ref 4.8–10.8)

## 2025-08-28 PROCEDURE — 3017F COLORECTAL CA SCREEN DOC REV: CPT | Performed by: SURGERY

## 2025-08-28 PROCEDURE — 99205 OFFICE O/P NEW HI 60 MIN: CPT | Performed by: SURGERY

## 2025-08-28 PROCEDURE — G8417 CALC BMI ABV UP PARAM F/U: HCPCS | Performed by: SURGERY

## 2025-08-28 PROCEDURE — G8427 DOCREV CUR MEDS BY ELIG CLIN: HCPCS | Performed by: SURGERY

## 2025-08-28 PROCEDURE — 1036F TOBACCO NON-USER: CPT | Performed by: SURGERY

## 2025-08-28 RX ORDER — POLYETHYLENE GLYCOL 3350, SODIUM SULFATE ANHYDROUS, SODIUM BICARBONATE, SODIUM CHLORIDE, POTASSIUM CHLORIDE 236; 22.74; 6.74; 5.86; 2.97 G/4L; G/4L; G/4L; G/4L; G/4L
4000 POWDER, FOR SOLUTION ORAL ONCE
Qty: 4000 ML | Refills: 0 | Status: SHIPPED | OUTPATIENT
Start: 2025-08-28 | End: 2025-08-28

## 2025-08-28 RX ORDER — FLUTICASONE PROPIONATE 50 MCG
SPRAY, SUSPENSION (ML) NASAL
COMMUNITY

## 2025-08-28 RX ORDER — SODIUM, POTASSIUM,MAG SULFATES 17.5-3.13G
177 SOLUTION, RECONSTITUTED, ORAL ORAL SEE ADMIN INSTRUCTIONS
Qty: 354 ML | Refills: 0 | Status: SHIPPED | OUTPATIENT
Start: 2025-08-28

## 2025-08-28 RX ORDER — ACYCLOVIR 800 MG/1
TABLET ORAL
COMMUNITY
Start: 2025-02-06

## 2025-08-28 RX ORDER — ALPRAZOLAM 1 MG/1
TABLET ORAL
COMMUNITY
Start: 2025-07-10

## 2025-08-28 RX ORDER — TIRZEPATIDE 2.5 MG/.5ML
INJECTION, SOLUTION SUBCUTANEOUS
COMMUNITY

## 2025-08-28 RX ORDER — TOPIRAMATE 50 MG/1
50 TABLET, FILM COATED ORAL 2 TIMES DAILY
Qty: 90 TABLET | Refills: 1 | Status: SHIPPED | OUTPATIENT
Start: 2025-08-28

## 2025-08-29 DIAGNOSIS — E66.01 MORBID OBESITY (HCC): Primary | ICD-10-CM

## 2025-08-29 LAB
ESTIMATED AVERAGE GLUCOSE: 94 MG/DL
HBA1C MFR BLD: 4.9 % (ref 4–6)
IRON % SATURATION: 18 % (ref 20–55)
IRON: 54 UG/DL (ref 37–145)
TOTAL IRON BINDING CAPACITY: 296 UG/DL (ref 250–450)
UNSATURATED IRON BINDING CAPACITY: 242 UG/DL (ref 112–347)
VITAMIN B-12: 746 PG/ML (ref 232–1245)
VITAMIN D 25-HYDROXY: 38.3 NG/ML (ref 30–100)

## 2025-08-30 LAB
CA-I ADJ PH7.4 SERPL-SCNC: 1.25 MMOL/L (ref 1.09–1.3)
CA-I SERPL ISE-SCNC: 1.22 MMOL/L (ref 1.09–1.3)

## 2025-09-01 LAB — VIT B1 BLD-MCNC: 128 NMOL/L (ref 70–180)
